# Patient Record
Sex: MALE | Race: WHITE | NOT HISPANIC OR LATINO
[De-identification: names, ages, dates, MRNs, and addresses within clinical notes are randomized per-mention and may not be internally consistent; named-entity substitution may affect disease eponyms.]

---

## 2017-03-10 ENCOUNTER — APPOINTMENT (OUTPATIENT)
Dept: NEUROLOGY | Facility: CLINIC | Age: 82
End: 2017-03-10

## 2017-04-17 ENCOUNTER — LABORATORY RESULT (OUTPATIENT)
Age: 82
End: 2017-04-17

## 2017-04-17 ENCOUNTER — APPOINTMENT (OUTPATIENT)
Dept: INTERNAL MEDICINE | Facility: CLINIC | Age: 82
End: 2017-04-17

## 2017-04-17 VITALS
RESPIRATION RATE: 14 BRPM | HEART RATE: 73 BPM | DIASTOLIC BLOOD PRESSURE: 64 MMHG | BODY MASS INDEX: 22.21 KG/M2 | TEMPERATURE: 98 F | HEIGHT: 72 IN | OXYGEN SATURATION: 93 % | SYSTOLIC BLOOD PRESSURE: 118 MMHG | WEIGHT: 164 LBS

## 2017-04-19 LAB
ALBUMIN SERPL ELPH-MCNC: 3.5 G/DL
ALP BLD-CCNC: 120 U/L
ALT SERPL-CCNC: 31 U/L
ANION GAP SERPL CALC-SCNC: 15 MMOL/L
AST SERPL-CCNC: 54 U/L
BASOPHILS # BLD AUTO: 0.03 K/UL
BASOPHILS NFR BLD AUTO: 0.6 %
BILIRUB SERPL-MCNC: 0.8 MG/DL
BUN SERPL-MCNC: 17 MG/DL
CALCIUM SERPL-MCNC: 9.5 MG/DL
CHLORIDE SERPL-SCNC: 104 MMOL/L
CHOLEST SERPL-MCNC: 200 MG/DL
CHOLEST/HDLC SERPL: 2.3 RATIO
CO2 SERPL-SCNC: 23 MMOL/L
CREAT SERPL-MCNC: 1.05 MG/DL
EOSINOPHIL # BLD AUTO: 0.13 K/UL
EOSINOPHIL NFR BLD AUTO: 2.8 %
FOLATE SERPL-MCNC: >20 NG/ML
GLUCOSE SERPL-MCNC: 99 MG/DL
HCT VFR BLD CALC: 33.6 %
HDLC SERPL-MCNC: 86 MG/DL
HGB BLD-MCNC: 10.8 G/DL
IMM GRANULOCYTES NFR BLD AUTO: 1.7 %
LDLC SERPL CALC-MCNC: 98 MG/DL
LYMPHOCYTES # BLD AUTO: 1.9 K/UL
LYMPHOCYTES NFR BLD AUTO: 40.7 %
MAN DIFF?: NORMAL
MCHC RBC-ENTMCNC: 32.1 GM/DL
MCHC RBC-ENTMCNC: 33.8 PG
MCV RBC AUTO: 105 FL
MONOCYTES # BLD AUTO: 1.01 K/UL
MONOCYTES NFR BLD AUTO: 21.6 %
NEUTROPHILS # BLD AUTO: 1.52 K/UL
NEUTROPHILS NFR BLD AUTO: 32.6 %
PLATELET # BLD AUTO: 81 K/UL
POTASSIUM SERPL-SCNC: 4.5 MMOL/L
PROT SERPL-MCNC: 6.6 G/DL
RBC # BLD: 3.2 M/UL
RBC # FLD: 20.9 %
SODIUM SERPL-SCNC: 142 MMOL/L
TRIGL SERPL-MCNC: 81 MG/DL
TSH SERPL-ACNC: 2.92 UIU/ML
VIT B12 SERPL-MCNC: 1176 PG/ML
WBC # FLD AUTO: 4.67 K/UL

## 2017-06-12 ENCOUNTER — APPOINTMENT (OUTPATIENT)
Dept: INTERNAL MEDICINE | Facility: CLINIC | Age: 82
End: 2017-06-12

## 2017-06-12 VITALS
RESPIRATION RATE: 16 BRPM | HEART RATE: 70 BPM | DIASTOLIC BLOOD PRESSURE: 70 MMHG | SYSTOLIC BLOOD PRESSURE: 105 MMHG | TEMPERATURE: 98.2 F | OXYGEN SATURATION: 94 % | BODY MASS INDEX: 22.79 KG/M2 | WEIGHT: 168 LBS

## 2017-06-13 LAB
APPEARANCE: CLEAR
BACTERIA: NEGATIVE
BILIRUBIN URINE: NEGATIVE
BLOOD URINE: NEGATIVE
COLOR: YELLOW
GLUCOSE QUALITATIVE U: NORMAL MG/DL
HYALINE CASTS: 1 /LPF
KETONES URINE: NEGATIVE
LEUKOCYTE ESTERASE URINE: NEGATIVE
MICROSCOPIC-UA: NORMAL
NITRITE URINE: NEGATIVE
PH URINE: 5
PROTEIN URINE: NEGATIVE MG/DL
RED BLOOD CELLS URINE: 1 /HPF
SPECIFIC GRAVITY URINE: 1.02
SQUAMOUS EPITHELIAL CELLS: 0 /HPF
UROBILINOGEN URINE: NORMAL MG/DL
WHITE BLOOD CELLS URINE: 1 /HPF

## 2017-08-09 ENCOUNTER — APPOINTMENT (OUTPATIENT)
Dept: INTERNAL MEDICINE | Facility: CLINIC | Age: 82
End: 2017-08-09
Payer: MEDICARE

## 2017-08-09 VITALS
BODY MASS INDEX: 22.75 KG/M2 | DIASTOLIC BLOOD PRESSURE: 70 MMHG | SYSTOLIC BLOOD PRESSURE: 122 MMHG | OXYGEN SATURATION: 98 % | TEMPERATURE: 97.4 F | RESPIRATION RATE: 14 BRPM | HEIGHT: 72 IN | HEART RATE: 68 BPM | WEIGHT: 168 LBS

## 2017-08-09 PROCEDURE — 99214 OFFICE O/P EST MOD 30 MIN: CPT

## 2017-08-10 LAB
CHOLEST SERPL-MCNC: 193 MG/DL
CHOLEST/HDLC SERPL: 2.1 RATIO
HDLC SERPL-MCNC: 92 MG/DL
LDLC SERPL CALC-MCNC: 89 MG/DL
TRIGL SERPL-MCNC: 61 MG/DL

## 2017-08-21 ENCOUNTER — APPOINTMENT (OUTPATIENT)
Dept: HEART AND VASCULAR | Facility: CLINIC | Age: 82
End: 2017-08-21
Payer: MEDICARE

## 2017-08-21 VITALS
OXYGEN SATURATION: 97 % | DIASTOLIC BLOOD PRESSURE: 68 MMHG | HEART RATE: 80 BPM | SYSTOLIC BLOOD PRESSURE: 118 MMHG | TEMPERATURE: 97.2 F

## 2017-08-21 PROCEDURE — 99214 OFFICE O/P EST MOD 30 MIN: CPT | Mod: 25

## 2017-08-21 PROCEDURE — 93306 TTE W/DOPPLER COMPLETE: CPT | Mod: XE

## 2017-08-21 PROCEDURE — 93000 ELECTROCARDIOGRAM COMPLETE: CPT

## 2017-09-12 ENCOUNTER — OUTPATIENT (OUTPATIENT)
Dept: OUTPATIENT SERVICES | Facility: HOSPITAL | Age: 82
LOS: 1 days | End: 2017-09-12
Payer: MEDICARE

## 2017-09-12 DIAGNOSIS — Z90.49 ACQUIRED ABSENCE OF OTHER SPECIFIED PARTS OF DIGESTIVE TRACT: Chronic | ICD-10-CM

## 2017-09-12 PROCEDURE — 74177 CT ABD & PELVIS W/CONTRAST: CPT

## 2017-09-12 PROCEDURE — 71260 CT THORAX DX C+: CPT

## 2017-09-12 PROCEDURE — 74177 CT ABD & PELVIS W/CONTRAST: CPT | Mod: 26

## 2017-09-12 PROCEDURE — 71260 CT THORAX DX C+: CPT | Mod: 26

## 2018-01-18 ENCOUNTER — OUTPATIENT (OUTPATIENT)
Dept: OUTPATIENT SERVICES | Facility: HOSPITAL | Age: 83
LOS: 1 days | End: 2018-01-18
Payer: MEDICARE

## 2018-01-18 DIAGNOSIS — Z90.49 ACQUIRED ABSENCE OF OTHER SPECIFIED PARTS OF DIGESTIVE TRACT: Chronic | ICD-10-CM

## 2018-01-18 PROCEDURE — 74177 CT ABD & PELVIS W/CONTRAST: CPT | Mod: 26

## 2018-01-18 PROCEDURE — 71260 CT THORAX DX C+: CPT | Mod: 26

## 2018-01-18 PROCEDURE — 71260 CT THORAX DX C+: CPT

## 2018-01-18 PROCEDURE — 74177 CT ABD & PELVIS W/CONTRAST: CPT

## 2018-01-26 ENCOUNTER — APPOINTMENT (OUTPATIENT)
Dept: INTERNAL MEDICINE | Facility: CLINIC | Age: 83
End: 2018-01-26

## 2018-02-05 ENCOUNTER — TRANSCRIPTION ENCOUNTER (OUTPATIENT)
Age: 83
End: 2018-02-05

## 2018-04-17 ENCOUNTER — APPOINTMENT (OUTPATIENT)
Dept: OTOLARYNGOLOGY | Facility: CLINIC | Age: 83
End: 2018-04-17

## 2018-04-25 ENCOUNTER — APPOINTMENT (OUTPATIENT)
Dept: OTOLARYNGOLOGY | Facility: CLINIC | Age: 83
End: 2018-04-25
Payer: MEDICARE

## 2018-04-25 VITALS
DIASTOLIC BLOOD PRESSURE: 56 MMHG | HEART RATE: 66 BPM | OXYGEN SATURATION: 99 % | RESPIRATION RATE: 15 BRPM | SYSTOLIC BLOOD PRESSURE: 117 MMHG | TEMPERATURE: 97.8 F

## 2018-04-25 DIAGNOSIS — R42 DIZZINESS AND GIDDINESS: ICD-10-CM

## 2018-04-25 PROCEDURE — 92537 CALORIC VSTBLR TEST W/REC: CPT

## 2018-04-25 PROCEDURE — 92540 BASIC VESTIBULAR EVALUATION: CPT

## 2018-04-25 PROCEDURE — 99204 OFFICE O/P NEW MOD 45 MIN: CPT

## 2018-04-26 PROBLEM — R42 DIZZINESS: Status: ACTIVE | Noted: 2018-04-26

## 2018-11-12 ENCOUNTER — APPOINTMENT (OUTPATIENT)
Dept: HEART AND VASCULAR | Facility: CLINIC | Age: 83
End: 2018-11-12

## 2018-11-12 ENCOUNTER — APPOINTMENT (OUTPATIENT)
Dept: INTERNAL MEDICINE | Facility: CLINIC | Age: 83
End: 2018-11-12
Payer: MEDICARE

## 2018-11-12 VITALS
RESPIRATION RATE: 14 BRPM | DIASTOLIC BLOOD PRESSURE: 60 MMHG | SYSTOLIC BLOOD PRESSURE: 120 MMHG | OXYGEN SATURATION: 97 % | TEMPERATURE: 97.4 F | WEIGHT: 183 LBS | HEART RATE: 79 BPM | BODY MASS INDEX: 24.82 KG/M2

## 2018-11-12 DIAGNOSIS — G47.62 SLEEP RELATED LEG CRAMPS: ICD-10-CM

## 2018-11-12 PROCEDURE — 99214 OFFICE O/P EST MOD 30 MIN: CPT

## 2018-11-12 NOTE — HISTORY OF PRESENT ILLNESS
[FreeTextEntry1] : hand pain [de-identified] : Hand pain - twisting it last month  - not getting better\par \par Leg cramping at night.\par  - tyring to drink more water. \par taking mag and potassium supplements -  \par treatment is to walk around.  \par \par With eating - os=ccasional a small amount comes up.  \par \par

## 2018-11-12 NOTE — PHYSICAL EXAM
[No Acute Distress] : no acute distress [Well Nourished] : well nourished [Well Developed] : well developed [Normal Oropharynx] : the oropharynx was normal [Soft] : abdomen soft [Non Tender] : non-tender [No Joint Swelling] : no joint swelling [de-identified] : point td over left thenar process

## 2018-11-12 NOTE — PLAN
[FreeTextEntry1] : \par \par Reflux - trial of prilosec - if no reema improvemetn the GI eval - Dr Bowden\par \par Cramping- no easy solution -stretching reviewed\par \par Ortho hand referral\par

## 2019-01-25 ENCOUNTER — APPOINTMENT (OUTPATIENT)
Dept: INTERNAL MEDICINE | Facility: CLINIC | Age: 84
End: 2019-01-25
Payer: MEDICARE

## 2019-01-25 VITALS
BODY MASS INDEX: 24.92 KG/M2 | SYSTOLIC BLOOD PRESSURE: 120 MMHG | HEART RATE: 75 BPM | HEIGHT: 72 IN | TEMPERATURE: 97.6 F | OXYGEN SATURATION: 98 % | RESPIRATION RATE: 14 BRPM | DIASTOLIC BLOOD PRESSURE: 58 MMHG | WEIGHT: 184 LBS

## 2019-01-25 PROCEDURE — 36415 COLL VENOUS BLD VENIPUNCTURE: CPT

## 2019-01-25 PROCEDURE — 93000 ELECTROCARDIOGRAM COMPLETE: CPT

## 2019-01-25 PROCEDURE — 99397 PER PM REEVAL EST PAT 65+ YR: CPT

## 2019-01-27 LAB
ALBUMIN SERPL ELPH-MCNC: 4.3 G/DL
ALP BLD-CCNC: 65 U/L
ALT SERPL-CCNC: 15 U/L
ANION GAP SERPL CALC-SCNC: 12 MMOL/L
APPEARANCE: CLEAR
AST SERPL-CCNC: 23 U/L
BASOPHILS # BLD AUTO: 0.09 K/UL
BASOPHILS NFR BLD AUTO: 1.2 %
BILIRUB SERPL-MCNC: 0.2 MG/DL
BILIRUBIN URINE: NEGATIVE
BLOOD URINE: NEGATIVE
BUN SERPL-MCNC: 29 MG/DL
CALCIUM SERPL-MCNC: 9.1 MG/DL
CHLORIDE SERPL-SCNC: 106 MMOL/L
CHOLEST SERPL-MCNC: 172 MG/DL
CHOLEST/HDLC SERPL: 2.5 RATIO
CO2 SERPL-SCNC: 24 MMOL/L
COLOR: YELLOW
CREAT SERPL-MCNC: 1.37 MG/DL
EOSINOPHIL # BLD AUTO: 0.33 K/UL
EOSINOPHIL NFR BLD AUTO: 4.5 %
GLUCOSE QUALITATIVE U: NEGATIVE MG/DL
GLUCOSE SERPL-MCNC: 78 MG/DL
HCT VFR BLD CALC: 38.1 %
HDLC SERPL-MCNC: 69 MG/DL
HGB BLD-MCNC: 12.7 G/DL
IMM GRANULOCYTES NFR BLD AUTO: 0.4 %
KETONES URINE: NEGATIVE
LDLC SERPL CALC-MCNC: 89 MG/DL
LEUKOCYTE ESTERASE URINE: ABNORMAL
LYMPHOCYTES # BLD AUTO: 2.11 K/UL
LYMPHOCYTES NFR BLD AUTO: 28.8 %
MAN DIFF?: NORMAL
MCHC RBC-ENTMCNC: 31.3 PG
MCHC RBC-ENTMCNC: 33.3 GM/DL
MCV RBC AUTO: 93.8 FL
MONOCYTES # BLD AUTO: 0.81 K/UL
MONOCYTES NFR BLD AUTO: 11.1 %
NEUTROPHILS # BLD AUTO: 3.95 K/UL
NEUTROPHILS NFR BLD AUTO: 54 %
NITRITE URINE: NEGATIVE
PH URINE: 5
PLATELET # BLD AUTO: 279 K/UL
POTASSIUM SERPL-SCNC: 5 MMOL/L
PROT SERPL-MCNC: 7.3 G/DL
PROTEIN URINE: NEGATIVE MG/DL
RBC # BLD: 4.06 M/UL
RBC # FLD: 14.9 %
SODIUM SERPL-SCNC: 142 MMOL/L
SPECIFIC GRAVITY URINE: 1.02
TRIGL SERPL-MCNC: 72 MG/DL
TSH SERPL-ACNC: 3.95 UIU/ML
UROBILINOGEN URINE: NEGATIVE MG/DL
VIT B12 SERPL-MCNC: 912 PG/ML
WBC # FLD AUTO: 7.32 K/UL

## 2019-01-27 NOTE — HISTORY OF PRESENT ILLNESS
[FreeTextEntry1] : wellness and fatigue [de-identified] : wakes up with leg cramps\par  has intemrittent sleeping- gets up to work\par  not exercising.\par gets tired with 2 flights of stairs.\par also with itching right eye last 3 days no vision changes

## 2019-01-27 NOTE — PLAN
[FreeTextEntry1] : Wellness complete\par labs today\par  encourage more physical activity- stairs daily\par continue current medication regimen\par

## 2019-01-27 NOTE — PHYSICAL EXAM
[No Acute Distress] : no acute distress [Well Nourished] : well nourished [Well Developed] : well developed [Well-Appearing] : well-appearing [Normal Sclera/Conjunctiva] : normal sclera/conjunctiva [PERRL] : pupils equal round and reactive to light [EOMI] : extraocular movements intact [Normal Outer Ear/Nose] : the outer ears and nose were normal in appearance [Normal Oropharynx] : the oropharynx was normal [No JVD] : no jugular venous distention [Supple] : supple [No Lymphadenopathy] : no lymphadenopathy [Thyroid Normal, No Nodules] : the thyroid was normal and there were no nodules present [No Respiratory Distress] : no respiratory distress  [Clear to Auscultation] : lungs were clear to auscultation bilaterally [No Accessory Muscle Use] : no accessory muscle use [Normal Rate] : normal rate  [Regular Rhythm] : with a regular rhythm [Normal S1, S2] : normal S1 and S2 [No Murmur] : no murmur heard [Soft] : abdomen soft [Non Tender] : non-tender [Non-distended] : non-distended [No Masses] : no abdominal mass palpated [No HSM] : no HSM [Normal Bowel Sounds] : normal bowel sounds [Normal Posterior Cervical Nodes] : no posterior cervical lymphadenopathy [Normal Anterior Cervical Nodes] : no anterior cervical lymphadenopathy [No CVA Tenderness] : no CVA  tenderness [No Spinal Tenderness] : no spinal tenderness [No Joint Swelling] : no joint swelling [Grossly Normal Strength/Tone] : grossly normal strength/tone [No Rash] : no rash [Normal Gait] : normal gait [Coordination Grossly Intact] : coordination grossly intact [No Focal Deficits] : no focal deficits [Deep Tendon Reflexes (DTR)] : deep tendon reflexes were 2+ and symmetric [Normal Affect] : the affect was normal [Normal Insight/Judgement] : insight and judgment were intact [de-identified] : trace edema

## 2019-01-29 LAB
HGB A MFR BLD: 97.5 %
HGB A2 MFR BLD: 2.5 %
HGB FRACT BLD-IMP: NORMAL

## 2019-03-04 ENCOUNTER — TRANSCRIPTION ENCOUNTER (OUTPATIENT)
Age: 84
End: 2019-03-04

## 2019-04-03 ENCOUNTER — RESULT REVIEW (OUTPATIENT)
Age: 84
End: 2019-04-03

## 2019-04-03 ENCOUNTER — MESSAGE (OUTPATIENT)
Age: 84
End: 2019-04-03

## 2019-04-03 ENCOUNTER — INPATIENT (INPATIENT)
Facility: HOSPITAL | Age: 84
LOS: 1 days | Discharge: HOME | DRG: 871 | End: 2019-04-05
Payer: MEDICARE

## 2019-04-03 VITALS
OXYGEN SATURATION: 97 % | RESPIRATION RATE: 18 BRPM | DIASTOLIC BLOOD PRESSURE: 72 MMHG | SYSTOLIC BLOOD PRESSURE: 123 MMHG | HEART RATE: 89 BPM | TEMPERATURE: 102 F

## 2019-04-03 DIAGNOSIS — R32 UNSPECIFIED URINARY INCONTINENCE: ICD-10-CM

## 2019-04-03 DIAGNOSIS — G92 TOXIC ENCEPHALOPATHY: ICD-10-CM

## 2019-04-03 DIAGNOSIS — N40.0 BENIGN PROSTATIC HYPERPLASIA WITHOUT LOWER URINARY TRACT SYMPTOMS: ICD-10-CM

## 2019-04-03 DIAGNOSIS — D64.9 ANEMIA, UNSPECIFIED: ICD-10-CM

## 2019-04-03 DIAGNOSIS — R50.9 FEVER, UNSPECIFIED: ICD-10-CM

## 2019-04-03 DIAGNOSIS — A41.9 SEPSIS, UNSPECIFIED ORGANISM: ICD-10-CM

## 2019-04-03 DIAGNOSIS — C18.9 MALIGNANT NEOPLASM OF COLON, UNSPECIFIED: ICD-10-CM

## 2019-04-03 DIAGNOSIS — Z90.49 ACQUIRED ABSENCE OF OTHER SPECIFIED PARTS OF DIGESTIVE TRACT: Chronic | ICD-10-CM

## 2019-04-03 DIAGNOSIS — Z91.89 OTHER SPECIFIED PERSONAL RISK FACTORS, NOT ELSEWHERE CLASSIFIED: ICD-10-CM

## 2019-04-03 DIAGNOSIS — Z29.9 ENCOUNTER FOR PROPHYLACTIC MEASURES, UNSPECIFIED: ICD-10-CM

## 2019-04-03 DIAGNOSIS — R63.8 OTHER SYMPTOMS AND SIGNS CONCERNING FOOD AND FLUID INTAKE: ICD-10-CM

## 2019-04-03 LAB
ALBUMIN SERPL ELPH-MCNC: 3.7 G/DL — SIGNIFICANT CHANGE UP (ref 3.3–5)
ALP SERPL-CCNC: 60 U/L — SIGNIFICANT CHANGE UP (ref 40–120)
ALT FLD-CCNC: 14 U/L — SIGNIFICANT CHANGE UP (ref 10–45)
ANION GAP SERPL CALC-SCNC: 10 MMOL/L — SIGNIFICANT CHANGE UP (ref 5–17)
APPEARANCE UR: CLEAR — SIGNIFICANT CHANGE UP
APPEARANCE UR: CLEAR — SIGNIFICANT CHANGE UP
APTT BLD: 30.7 SEC — SIGNIFICANT CHANGE UP (ref 27.5–36.3)
AST SERPL-CCNC: 20 U/L — SIGNIFICANT CHANGE UP (ref 10–40)
BACTERIA # UR AUTO: PRESENT /HPF
BASOPHILS # BLD AUTO: 0.07 K/UL — SIGNIFICANT CHANGE UP (ref 0–0.2)
BASOPHILS NFR BLD AUTO: 0.6 % — SIGNIFICANT CHANGE UP (ref 0–2)
BILIRUB SERPL-MCNC: 0.5 MG/DL — SIGNIFICANT CHANGE UP (ref 0.2–1.2)
BILIRUB UR-MCNC: NEGATIVE — SIGNIFICANT CHANGE UP
BILIRUB UR-MCNC: NEGATIVE — SIGNIFICANT CHANGE UP
BUN SERPL-MCNC: 17 MG/DL — SIGNIFICANT CHANGE UP (ref 7–23)
CALCIUM SERPL-MCNC: 9.2 MG/DL — SIGNIFICANT CHANGE UP (ref 8.4–10.5)
CHLORIDE SERPL-SCNC: 102 MMOL/L — SIGNIFICANT CHANGE UP (ref 96–108)
CO2 SERPL-SCNC: 25 MMOL/L — SIGNIFICANT CHANGE UP (ref 22–31)
COLOR SPEC: YELLOW — SIGNIFICANT CHANGE UP
COLOR SPEC: YELLOW — SIGNIFICANT CHANGE UP
CREAT SERPL-MCNC: 1.27 MG/DL — SIGNIFICANT CHANGE UP (ref 0.5–1.3)
DIFF PNL FLD: ABNORMAL
DIFF PNL FLD: ABNORMAL
EOSINOPHIL # BLD AUTO: 0.04 K/UL — SIGNIFICANT CHANGE UP (ref 0–0.5)
EOSINOPHIL NFR BLD AUTO: 0.3 % — SIGNIFICANT CHANGE UP (ref 0–6)
EPI CELLS # UR: SIGNIFICANT CHANGE UP /HPF (ref 0–5)
FLU A RESULT: SIGNIFICANT CHANGE UP
FLU A RESULT: SIGNIFICANT CHANGE UP
FLUAV AG NPH QL: SIGNIFICANT CHANGE UP
FLUBV AG NPH QL: SIGNIFICANT CHANGE UP
GLUCOSE SERPL-MCNC: 112 MG/DL — HIGH (ref 70–99)
GLUCOSE UR QL: NEGATIVE — SIGNIFICANT CHANGE UP
GLUCOSE UR QL: NEGATIVE — SIGNIFICANT CHANGE UP
HCT VFR BLD CALC: 38.2 % — LOW (ref 39–50)
HGB BLD-MCNC: 12.6 G/DL — LOW (ref 13–17)
IMM GRANULOCYTES NFR BLD AUTO: 0.3 % — SIGNIFICANT CHANGE UP (ref 0–1.5)
INR BLD: 1.21 — HIGH (ref 0.88–1.16)
KETONES UR-MCNC: ABNORMAL MG/DL
KETONES UR-MCNC: NEGATIVE — SIGNIFICANT CHANGE UP
LACTATE SERPL-SCNC: 1.5 MMOL/L — SIGNIFICANT CHANGE UP (ref 0.5–2)
LEUKOCYTE ESTERASE UR-ACNC: NEGATIVE — SIGNIFICANT CHANGE UP
LEUKOCYTE ESTERASE UR-ACNC: NEGATIVE — SIGNIFICANT CHANGE UP
LYMPHOCYTES # BLD AUTO: 0.8 K/UL — LOW (ref 1–3.3)
LYMPHOCYTES # BLD AUTO: 6.7 % — LOW (ref 13–44)
MCHC RBC-ENTMCNC: 31.4 PG — SIGNIFICANT CHANGE UP (ref 27–34)
MCHC RBC-ENTMCNC: 33 GM/DL — SIGNIFICANT CHANGE UP (ref 32–36)
MCV RBC AUTO: 95.3 FL — SIGNIFICANT CHANGE UP (ref 80–100)
MONOCYTES # BLD AUTO: 1.14 K/UL — HIGH (ref 0–0.9)
MONOCYTES NFR BLD AUTO: 9.5 % — SIGNIFICANT CHANGE UP (ref 2–14)
NEUTROPHILS # BLD AUTO: 9.94 K/UL — HIGH (ref 1.8–7.4)
NEUTROPHILS NFR BLD AUTO: 82.6 % — HIGH (ref 43–77)
NITRITE UR-MCNC: NEGATIVE — SIGNIFICANT CHANGE UP
NITRITE UR-MCNC: NEGATIVE — SIGNIFICANT CHANGE UP
NRBC # BLD: 0 /100 WBCS — SIGNIFICANT CHANGE UP (ref 0–0)
PH UR: 6 — SIGNIFICANT CHANGE UP (ref 5–8)
PH UR: 6 — SIGNIFICANT CHANGE UP (ref 5–8)
PLATELET # BLD AUTO: 227 K/UL — SIGNIFICANT CHANGE UP (ref 150–400)
POTASSIUM SERPL-MCNC: 4.8 MMOL/L — SIGNIFICANT CHANGE UP (ref 3.5–5.3)
POTASSIUM SERPL-SCNC: 4.8 MMOL/L — SIGNIFICANT CHANGE UP (ref 3.5–5.3)
PROT SERPL-MCNC: 7.2 G/DL — SIGNIFICANT CHANGE UP (ref 6–8.3)
PROT UR-MCNC: NEGATIVE MG/DL — SIGNIFICANT CHANGE UP
PROT UR-MCNC: NEGATIVE MG/DL — SIGNIFICANT CHANGE UP
PROTHROM AB SERPL-ACNC: 13.7 SEC — HIGH (ref 10–12.9)
RBC # BLD: 4.01 M/UL — LOW (ref 4.2–5.8)
RBC # FLD: 13.9 % — SIGNIFICANT CHANGE UP (ref 10.3–14.5)
RBC CASTS # UR COMP ASSIST: < 5 /HPF — SIGNIFICANT CHANGE UP
RSV RESULT: SIGNIFICANT CHANGE UP
RSV RNA RESP QL NAA+PROBE: SIGNIFICANT CHANGE UP
SODIUM SERPL-SCNC: 137 MMOL/L — SIGNIFICANT CHANGE UP (ref 135–145)
SP GR SPEC: 1.02 — SIGNIFICANT CHANGE UP (ref 1–1.03)
SP GR SPEC: <=1.005 — SIGNIFICANT CHANGE UP (ref 1–1.03)
UROBILINOGEN FLD QL: 0.2 E.U./DL — SIGNIFICANT CHANGE UP
UROBILINOGEN FLD QL: 0.2 E.U./DL — SIGNIFICANT CHANGE UP
WBC # BLD: 12.03 K/UL — HIGH (ref 3.8–10.5)
WBC # FLD AUTO: 12.03 K/UL — HIGH (ref 3.8–10.5)
WBC UR QL: ABNORMAL /HPF

## 2019-04-03 PROCEDURE — 99223 1ST HOSP IP/OBS HIGH 75: CPT | Mod: GC

## 2019-04-03 PROCEDURE — 99285 EMERGENCY DEPT VISIT HI MDM: CPT

## 2019-04-03 PROCEDURE — 71045 X-RAY EXAM CHEST 1 VIEW: CPT | Mod: 26

## 2019-04-03 RX ORDER — HEPARIN SODIUM 5000 [USP'U]/ML
5000 INJECTION INTRAVENOUS; SUBCUTANEOUS EVERY 8 HOURS
Qty: 0 | Refills: 0 | Status: DISCONTINUED | OUTPATIENT
Start: 2019-04-03 | End: 2019-04-05

## 2019-04-03 RX ORDER — ACETAMINOPHEN 500 MG
650 TABLET ORAL EVERY 6 HOURS
Qty: 0 | Refills: 0 | Status: DISCONTINUED | OUTPATIENT
Start: 2019-04-03 | End: 2019-04-05

## 2019-04-03 RX ORDER — TAMSULOSIN HYDROCHLORIDE 0.4 MG/1
0.4 CAPSULE ORAL AT BEDTIME
Qty: 0 | Refills: 0 | Status: DISCONTINUED | OUTPATIENT
Start: 2019-04-03 | End: 2019-04-05

## 2019-04-03 RX ORDER — PIPERACILLIN AND TAZOBACTAM 4; .5 G/20ML; G/20ML
3.38 INJECTION, POWDER, LYOPHILIZED, FOR SOLUTION INTRAVENOUS ONCE
Qty: 0 | Refills: 0 | Status: COMPLETED | OUTPATIENT
Start: 2019-04-03 | End: 2019-04-03

## 2019-04-03 RX ORDER — VANCOMYCIN HCL 1 G
1000 VIAL (EA) INTRAVENOUS ONCE
Qty: 0 | Refills: 0 | Status: COMPLETED | OUTPATIENT
Start: 2019-04-03 | End: 2019-04-03

## 2019-04-03 RX ORDER — SODIUM CHLORIDE 9 MG/ML
2000 INJECTION INTRAMUSCULAR; INTRAVENOUS; SUBCUTANEOUS ONCE
Qty: 0 | Refills: 0 | Status: COMPLETED | OUTPATIENT
Start: 2019-04-03 | End: 2019-04-03

## 2019-04-03 RX ORDER — ACETAMINOPHEN 500 MG
1000 TABLET ORAL ONCE
Qty: 0 | Refills: 0 | Status: COMPLETED | OUTPATIENT
Start: 2019-04-03 | End: 2019-04-03

## 2019-04-03 RX ADMIN — PIPERACILLIN AND TAZOBACTAM 200 GRAM(S): 4; .5 INJECTION, POWDER, LYOPHILIZED, FOR SOLUTION INTRAVENOUS at 14:35

## 2019-04-03 RX ADMIN — PIPERACILLIN AND TAZOBACTAM 3.38 GRAM(S): 4; .5 INJECTION, POWDER, LYOPHILIZED, FOR SOLUTION INTRAVENOUS at 15:09

## 2019-04-03 RX ADMIN — Medication 1000 MILLIGRAM(S): at 15:14

## 2019-04-03 RX ADMIN — Medication 1000 MILLIGRAM(S): at 15:46

## 2019-04-03 RX ADMIN — Medication 1000 MILLIGRAM(S): at 14:36

## 2019-04-03 RX ADMIN — Medication 250 MILLIGRAM(S): at 15:23

## 2019-04-03 RX ADMIN — SODIUM CHLORIDE 666.67 MILLILITER(S): 9 INJECTION INTRAMUSCULAR; INTRAVENOUS; SUBCUTANEOUS at 14:30

## 2019-04-03 RX ADMIN — HEPARIN SODIUM 5000 UNIT(S): 5000 INJECTION INTRAVENOUS; SUBCUTANEOUS at 22:21

## 2019-04-03 RX ADMIN — TAMSULOSIN HYDROCHLORIDE 0.4 MILLIGRAM(S): 0.4 CAPSULE ORAL at 22:21

## 2019-04-03 NOTE — ED PROVIDER NOTE - NEURO NEGATIVE STATEMENT, MLM
no loss of consciousness, no gait abnormality, no headache, no sensory deficits, + generalized weakness.

## 2019-04-03 NOTE — H&P ADULT - NSHPPHYSICALEXAM_GEN_ALL_CORE
.  VITAL SIGNS:  T(C): 37.4 (04-03-19 @ 17:15), Max: 38.8 (04-03-19 @ 13:40)  T(F): 99.3 (04-03-19 @ 17:15), Max: 101.8 (04-03-19 @ 13:40)  HR: 74 (04-03-19 @ 17:15) (74 - 89)  BP: 113/58 (04-03-19 @ 17:15) (113/58 - 123/72)  BP(mean): --  RR: 18 (04-03-19 @ 17:15) (18 - 18)  SpO2: 96% (04-03-19 @ 17:15) (96% - 97%)  Wt(kg): --    PHYSICAL EXAM:    Constitutional: WDWN resting comfortably in bed; NAD  Head: NC/AT  Eyes: PERRL, EOMI, anicteric sclera  ENT: no nasal discharge - pale nasal mucosa; uvula midline, no pharyngeal erythema or exudates; tympanic membranes clear without air-fluid level or erythema; MMM  Neck: supple; no JVD or thyromegaly  Respiratory: CTA B/L; no W/R/R, no retractions  Cardiac: +S1/S2; RRR; no M/R/G; PMI non-displaced  Chest: Chemoport in place, no erythema or tenderness around site  Gastrointestinal: soft, mild tenderness to palpation in the suprapubic region; +BSx4  Back: spine midline, no bony tenderness or step-offs; no CVAT B/L  Extremities: WWP, no clubbing or cyanosis; mild non-pitting edema of the b/l LE  Musculoskeletal: NROM x4; no joint swelling, tenderness or erythema  Vascular: 2+ radial, femoral, DP/PT pulses B/L  Dermatologic: skin warm, dry and intact; no rashes, wounds, or scars  Lymphatic: no submandibular or cervical LAD  Neurologic: AAOx3; CNII-XII grossly intact; no focal deficits  Psychiatric: affect and characteristics of appearance, verbalizations, behaviors are appropriate

## 2019-04-03 NOTE — ED ADULT TRIAGE NOTE - CHIEF COMPLAINT QUOTE
As per wife "he started coming down with a cold yesterday and then at night he started urinating on himself."

## 2019-04-03 NOTE — H&P ADULT - PROBLEM SELECTOR PLAN 6
Patient with remote history of colon cancer, diagnosed in 1996 with short course of chemotherapy via chemoport and laparoscopic resection. No further treatment has been performed.   - Chemoport without erythema or tenderness  - f/u blood cultures, if bacteremic would consider as source  - Per patient, no plan for further chemotherapy, if bacteremic would consider removal admitted in 2016 for TIA, per ashley notes, pt deferred statin at the time, pt appears to not be on statin currently, obtain collateral from PCP re: statin use; c/w ASA

## 2019-04-03 NOTE — H&P ADULT - PROBLEM SELECTOR PLAN 5
Patient with history of BPH, does not report taking any medications. Some abdominal tenderness on examination, pending bladder scan.  - Flomax 0.4mg QHS  - Given hematuria on UA, obtain urine cytology  - Will refer to Urology for follow-up as has been lost to follow-up from prior Urologist Patient with remote history of colon cancer, diagnosed in 1996 with short course of chemotherapy via chemoport and laparoscopic resection. No further treatment has been performed.   - Chemoport without erythema or tenderness  - f/u blood cultures, if bacteremic would consider as source  - Per patient, no plan for further chemotherapy, if bacteremic would consider removal

## 2019-04-03 NOTE — H&P ADULT - PROBLEM SELECTOR PLAN 10
1) PCP Contacted on Admission: (Y/N) -->Y  Name & Phone #: Hair Hull [phone]  2) Date of Contact with PCP: 4/3/19 at 19:05  3) PCP Contacted at Discharge: (Y/N, N/A)  4) Summary of Handoff Given to PCP:   5) Post-Discharge Appointment Date and Location: 1) PCP Contacted on Admission: (Y/N) -->Y  Name & Phone #: Hair Hull [phone]  2) Date of Contact with PCP: 4/3/19 at 19:05  3) PCP Contacted at Discharge: (Y/N, N/A)  4) Summary of Handoff Given to PCP:   5) Post-Discharge Appointment Date and Location:

## 2019-04-03 NOTE — H&P ADULT - PROBLEM SELECTOR PLAN 7
Regular diet  No further IVF at this time  Replete electrolytes K>4; Mg>2 Patient with normocytic anemia of 12.6, appears chronic and without change since prior studies dating back to 2016. Possible BHAVYA vs ACD.  - Iron studies in AM  - Maintain active T&S, maintain IV access admitted in 2016 for TIA, per ashley notes, pt deferred statin at the time, pt appears to not be on statin currently, obtain collateral from PCP re: statin use; c/w ASA

## 2019-04-03 NOTE — H&P ADULT - NSICDXFAMILYHX_GEN_ALL_CORE_FT
FAMILY HISTORY:  No family history of cardiovascular disease FAMILY HISTORY:  FH: cancer, mother  FH: myocardial infarction, father

## 2019-04-03 NOTE — ED PROVIDER NOTE - CONSTITUTIONAL, MLM
normal... Non toxic appearing, well nourished, awake, alert, oriented to person, place, time/situation and in no apparent distress.

## 2019-04-03 NOTE — ED PROVIDER NOTE - RESPIRATORY NEGATIVE STATEMENT, MLM
Discussion/Summary    please note diabetes   a1c is getting better  but still not optima;      follwo up to review and adjust  mediactions     kisney and  liver functions good     prostate  numbers  good         Verified Results  COMP METABOLIC PANEL WITH CBCA, LIPID PANEL AND TSH (CMP,CBCA,LIPFA,TSH) [id number] 01:30PM ANTO[facility] FOOTE     Test Name Result Flag Reference   WHITE BLOOD COUNT 6.3 K/mcL  4.2-11.0   RED CELL COUNT 3.98 mil/mcL L 4.50-5.90   HEMOGLOBIN 12.4 g/dl L 13.0-17.0   HEMATOCRIT 37.6 % L 39.0-51.0   MEAN CORPUSCULAR VOLUME 94.5 fL  78.0-100.0   MEAN CORPUSCULAR HEMOGLOBIN 31.2 pg  26.0-34.0   MEAN CORPUSCULAR HGB CONC 33.0 g/dl  32.0-36.5   RDW-CV 13.2 %  11.0-15.0   PLATELET COUNT 226 K/mcL  140-450   DIFF TYPE      AUTOMATED DIFFERENTIAL   FARZANA% 60 %     LYM% 33 %     MON% 5 %     EOS% 2 %     BASO% 0 %     FARZANA ABS 3.8 K/mcL  1.8-7.7   LYM ABS 2.0 K/mcL  1.0-4.0   MON ABS 0.3 K/mcL  0.3-0.9   EOS ABS 0.1 K/mcL  0.1-0.5   BASO ABS 0.0 K/mcL  0.0-0.3   FASTING STATUS UNKNOWN hrs     SODIUM 143 mmol/L  135-145   POTASSIUM 3.8 mmol/L  3.4-5.1   CHLORIDE 110 mmol/L H    CARBON DIOXIDE 25 mmol/L  21-32   ANION GAP 12 mmol/L  10-20   GLUCOSE 168 mg/dl H 65-99   BUN 14 mg/dl  6-20   CREATININE 1.03 mg/dl  0.67-1.17   GFR EST.AFRICAN AMER >90     eGFR results = or >90 mL/min/1.73m2 = Normal kidney function.   GFR EST.NONAFRI AMER 79     eGFR 60 - 89 mL/min/1.73m2 = Mild decrease in kidney function.   BUN/CREATININE RATIO 14  7-25   CALCIUM 8.6 mg/dl  8.4-10.2   BILIRUBIN TOTAL 0.5 mg/dl  0.2-1.0   GOT/AST 21 Units/L  <38   GPT/ALT 34 Units/L  <79   ALKALINE PHOSPHATASE 90 Units/L     TOTAL PROTEIN 6.3 g/dl L 6.4-8.2   ALBUMIN 3.5 g/dl L 3.6-5.1   GLOBULIN (CALCULATED) 2.8 g/dl  2.0-4.0   A/G RATIO 1.2  1.0-2.4   FASTING STATUS UNKNOWN hrs     CHOLESTEROL 110 mg/dl  <200   Desirable            <200  Borderline High      200 to 239  High                 >=240   LDL CHOLESTEROL (CALCULATED) 53  mg/dl  <130   OPTIMAL               <100  NEAR OPTIMAL          100-129  BORDERLINE HIGH       130-159  HIGH                  160-189  VERY HIGH             >=190   HDL CHOLESTEROL 40 mg/dl L >59   Low            <40  Borderline Low 40 to 49  Near Optimal   50 to 59  Optimal        >=60   TRIGLYCERIDES 83 mg/dl  <150   Normal                   <150  Borderline High          150 to 199  High                     200 to 499  Very High                >=500   NON-HDL CHOLESTEROL 70 mg/dl     Therapeutic Target:  CHD and risk equivalents <130  Multiple risk factors    <160  0 to 1 risk factors      <190   CHOLESTEROL/HDL RATIO 2.8  <4.5   TSH 0.588 mcUnits/mL  0.350-5.000     HEMOGLOBIN A1C GLYCOSYLATED [id number] 01:30PM KISHORESCOTT LUZRHIANNON     Test Name Result Flag Reference   HEMOGLOBIN A1C GLYH 8.1 % H 4.5-5.6   ----DIABETIC SCREENING---  NON DIABETIC                 <5.7%  INCREASED RISK                5.7-6.4%  DIAGNOSTIC FOR DIABETES      >6.4%     ----DIABETIC CONTROL---     A1C%           eAG mg/dL  6.0            126  6.5            140  7.0            154  7.5            169  8.0            183  8.5            197  9.0            212  9.5            226  10.0           240     MICROALBUMIN, URINE [id number] 01:30PM KISHOREANTOINE CHAVEZ     Test Name Result Flag Reference   MICROALBUMIN 2.55 mg/dl     CREATININE RANDOM URINE 233.00 mg/dl     MICROALB/CREAT RATIO 10.9 mcg/mg  <30     PSA - PROSTATE SPECIFIC AG [id number] 01:30PM KISHORESCOTT LUZRHIANNON     Test Name Result Flag Reference   PROSTATE SPECIFIC AG 1.28 ng/ml  <4.01   SUGGESTED AGE SPECIFIC REFERENCE RANGES     AGE                NG/ML  40-49              0.01-2.50  50-59              0.01-3.50  60-69              0.01-4.50  70-79              0.01-6.50     REFERENCE: JOURNAL OF UROLOGY 155, [phone]     [employer] Chemiluminescence        no chest pain, no cough, and no shortness of breath.

## 2019-04-03 NOTE — H&P ADULT - PROBLEM SELECTOR PLAN 8
DELILAH WHITET Regular diet  No further IVF at this time  Replete electrolytes K>4; Mg>2 Regular diet  No further IVF at this time  Replete electrolytes K>4; Mg>2    #PPX: HSQ  MVT Patient with normocytic anemia of 12.6, appears chronic and without change since prior studies dating back to 2016. Possible BHAVYA vs ACD.  - Iron studies in AM  - Maintain active T&S, maintain IV access

## 2019-04-03 NOTE — H&P ADULT - NSHPLABSRESULTS_GEN_ALL_CORE
.  LABS:                         12.6    )-----------( 227      ( 2019 14:26 )             38.2         137  |  102  |  17  ----------------------------<  112<H>  4.8   |  25  |  1.27    Ca    9.2      2019 14:26    TPro  7.2  /  Alb  3.7  /  TBili  0.5  /  DBili  x   /  AST  20  /  ALT  14  /  AlkPhos  60      PT/INR - ( 2019 14:26 )   PT: 13.7 sec;   INR: 1.21          PTT - ( 2019 14:26 )  PTT:30.7 sec  Urinalysis Basic - ( 2019 16:15 )    Color: Yellow / Appearance: Clear / S.020 / pH: x  Gluc: x / Ketone: Trace mg/dL  / Bili: Negative / Urobili: 0.2 E.U./dL   Blood: x / Protein: NEGATIVE mg/dL / Nitrite: NEGATIVE   Leuk Esterase: NEGATIVE / RBC: > 10 /HPF / WBC < 5 /HPF   Sq Epi: x / Non Sq Epi: 0-5 /HPF / Bacteria: Present /HPF            Lactate, Blood: 1.5 mmoL/L ( @ 14:23)      RADIOLOGY, EKG & ADDITIONAL TESTS: Reviewed.

## 2019-04-03 NOTE — ED PROVIDER NOTE - OBJECTIVE STATEMENT
85 year old male on no medications presents to ED with concern for fever and not feeling himself since yesterday afternoon.  Wife at bedside notes he began with an elevated temperature yesterday of 98 - noting he typically runs lower.  On ED arrival today he is febrile.  He notes associated nasal congestion and feeling generally weak.  He denies associated headache, chest pain, shortness of breath, cough, body aches, abdominal pain, changes to bowel movements, nausea, emesis, peripheral edema or any additional acute complaints or concerns at this time.  Wife at bedside does note he had a UTI several years ago and had a similar presentation at that time.

## 2019-04-03 NOTE — H&P ADULT - PROBLEM SELECTOR PLAN 2
Patient with some fluctuation of mental status per wife, now back at baseline. Has happened in the past when patient had a UTI, likely TME 2/2 sepsis.  - As above Patient with some fluctuation of mental status per wife, now back at baseline. Has happened in the past when patient had a UTI, likely TME 2/2 sepsis.  - As above    ADDENDUM: pt w/ short term memory loss, suspect element of dementia , will need further evaluation as outpt, check TSH.

## 2019-04-03 NOTE — H&P ADULT - ASSESSMENT
86yo M with a PMHx of remote colon ca s/p chemo and partial colectomy and BPH presenting with sepsis and toxic metabolic encephalopathy of unclear etiology, likely bacterial sinusitis vs viral URI 86yo M with a PMHx of remote colon ca s/p chemo and partial colectomy , TIA (2016) and BPH presenting with sepsis and toxic metabolic encephalopathy of unclear etiology, likely bacterial sinusitis vs viral URI

## 2019-04-03 NOTE — H&P ADULT - NSHPSOCIALHISTORY_GEN_ALL_CORE
, lives with wife  Lives in [address] [facility]  Former smoker, quit 40 years ago - 1ppd x20 years  Drinks wine most nights, unspecified quantity  Denies recreational drugs, OTC supplements

## 2019-04-03 NOTE — H&P ADULT - PROBLEM SELECTOR PLAN 1
Patient presenting with fever to 101.8 orally, leukocytosis >12.0, and presumed source of infection as either sinusitis or viral URI as CXR negative, UA negative, and Flu swab negative. Patient's wife reporting some confusion/TME at home which has improved with breaking of the fever. Lactate of 1.5 on admission. Received 1 dose of Vancomycin and 1 dose of Zosyn in the ED.  - Patient s/p 2000cc NS bolus, appears euvolemic  - Reperfusion study performed with capillary refill < 2 seconds  - f/u blood cultures  - f/u RVP  - Tylenol PRN for fever > 100.4F Patient presenting with fever to 101.8 orally, leukocytosis >12.0, and presumed source of infection as either sinusitis or viral URI as CXR negative, UA negative, and Flu swab negative. Patient's wife reporting some confusion/TME at home which has improved with breaking of the fever. Lactate of 1.5 on admission. Received 1 dose of Vancomycin and 1 dose of Zosyn in the ED.  - Patient s/p 2000cc NS bolus, appears euvolemic  - Reperfusion study performed with capillary refill < 2 seconds  - f/u blood cultures  - f/u RVP  - Tylenol PRN for fever > 100.4F    ADDENDUM: mild pyuria on repeat UA, done after hammer placement for urinary retention noted on bladder scan, pt w/ 500cc urine via straight cath. Concern for UTI given lack of URI/ sinus findings, will give a dose of ceftriaxone and followup uctx. agree w/ checking urine cytology. given urinary retention sxs will check sonogram of kidneys and bladder.

## 2019-04-03 NOTE — H&P ADULT - PROBLEM SELECTOR PLAN 4
Patient and patient's wife complaining of urinary incontinence x2 days with multiple trips to the bathroom. Patient with mild abdominal pain on exam in suprapubic region. Unclear if overflow incontinence.  - Pending bladder scan - if any evidence of retention will place Graves catheter Patient with history of BPH, does not report taking any medications. Some abdominal tenderness on examination, pending bladder scan.  - Flomax 0.4mg QHS  - Given hematuria on UA, obtain urine cytology  - Will refer to Urology for follow-up as has been lost to follow-up from prior Urologist Patient and patient's wife complaining of urinary incontinence x2 days with multiple trips to the bathroom. Patient with mild abdominal pain on exam in suprapubic region. Unclear if overflow incontinence.  - Pending bladder scan - if any evidence of retention will place Graves catheter    ADDENDUM: check renal and bladder sonogram

## 2019-04-03 NOTE — H&P ADULT - PROBLEM SELECTOR PLAN 9
1) PCP Contacted on Admission: (Y/N) -->Y  Name & Phone #: Hair Hull [phone]  2) Date of Contact with PCP: 4/3/19 at 19:05  3) PCP Contacted at Discharge: (Y/N, N/A)  4) Summary of Handoff Given to PCP:   5) Post-Discharge Appointment Date and Location: DELILAH WHITET 1) PCP Contacted on Admission: (Y/N) -->Y  Name & Phone #: Hair Hull [phone]  2) Date of Contact with PCP: 4/3/19 at 19:05  3) PCP Contacted at Discharge: (Y/N, N/A)  4) Summary of Handoff Given to PCP:   5) Post-Discharge Appointment Date and Location: Regular diet  No further IVF at this time  Replete electrolytes K>4; Mg>2    #PPX: HSQ  MVT

## 2019-04-03 NOTE — H&P ADULT - PROBLEM SELECTOR PLAN 3
Patient with fever, unclear etiology at this time. Possible bacterial sinusitis vs viral URI.  - As above Patient and patient's wife complaining of urinary incontinence x2 days with multiple trips to the bathroom. Patient with mild abdominal pain on exam in suprapubic region. Unclear if overflow incontinence.  - Pending bladder scan - if any evidence of retention will place Graves catheter ADDENDUM: +RVP for coronavirus , supportive care

## 2019-04-03 NOTE — H&P ADULT - NSICDXPASTMEDICALHX_GEN_ALL_CORE_FT
PAST MEDICAL HISTORY:  BPH (benign prostatic hyperplasia)     Colon cancer     Sepsis 2/2 UTI- December 2013 PAST MEDICAL HISTORY:  BPH (benign prostatic hyperplasia)     Brain TIA     Colon cancer     Sepsis 2/2 UTI- December 2013

## 2019-04-03 NOTE — ED PROVIDER NOTE - CLINICAL SUMMARY MEDICAL DECISION MAKING FREE TEXT BOX
Patient in ED w fever and generalized weakness.  Wife is concerned for UTI, noting history of bad UTI with similar presentation in the past.  She notes he has not been acting himself and is very weak.  Symptoms began yesterday and did not improved today Patient in ED w fever and generalized weakness.  Wife is concerned for UTI, noting history of bad UTI with similar presentation in the past.  She notes he has not been acting himself and is very weak.  Symptoms began yesterday and did not improved today, prompting his ED visit.  Patient is arousable on ED arrival, and able to answer questions appropriately.  Denies specific complaints aside from general weakness.  Labs including flu swabs sent.  CXR unremarkable.  Slight leukocytosis noted.  Given fever and presentation, broad spectrum abx therapy initiated in ED.  Patient and wife at bedside aware of all results and recommendation for admission to continue IVF, monitor, etc.  Patient and wife agreeable to plan.  Will admit at this time.

## 2019-04-03 NOTE — H&P ADULT - ATTENDING COMMENTS
patient seen and examined; pt reports to me of having HAs and sneezing x 2 weeks, brought into hospital for weakness and inability to get out to bed.     reviewed data including:  labs, available radiological reports/ studies, ekg, previous admission chart notes and/or imaging      agree w/ PE findings as above w/ additions/ exceptions/ pertinent findings: awake, alert, NAD; dry MM, no sinus tenderness b/l;  s1s2, lungs cta b/l, abdomen soft/nt/nd; +hammer catheter;  rectal exam reveals nontender nonboggy prostate;  trace lower ext edema b/l; pt oriented to person, forgetful of place, stated month was december and year was 2018     1. sepsis w/ mild pyuria, urinary incontinence: s/p vancomycin and zosyn; will give dose of IV ceftriaxone and followup ctxs, check renal sonogram. followup RVP ; given PO fluids at bedside, encourage PO fluid intake     rest of plan as above patient seen and examined; pt reports to me of having HAs and sneezing x 2 weeks, brought into hospital for weakness and inability to get out to bed.     reviewed data including:  labs, available radiological reports/ studies, ekg, previous admission chart notes and/or imaging      agree w/ PE findings as above w/ additions/ exceptions/ pertinent findings: awake, alert, NAD; dry MM, no sinus tenderness b/l;  s1s2, lungs cta b/l, abdomen soft/nt/nd; +hammer catheter;  rectal exam reveals nontender nonboggy prostate;  trace lower ext edema b/l; pt oriented to person, forgetful of place, stated month was december and year was 2018     1. sepsis w/ mild pyuria, urinary incontinence: s/p vancomycin and zosyn; will give dose of IV ceftriaxone and followup ctxs, check renal sonogram. followup RVP ; given PO fluids at bedside, encourage PO fluid intake     rest of plan as above    ADDENDUM: +RVP for coronavirus, likely cause of sxs, supportive care

## 2019-04-04 DIAGNOSIS — G45.9 TRANSIENT CEREBRAL ISCHEMIC ATTACK, UNSPECIFIED: ICD-10-CM

## 2019-04-04 DIAGNOSIS — B34.2 CORONAVIRUS INFECTION, UNSPECIFIED: ICD-10-CM

## 2019-04-04 LAB
ANION GAP SERPL CALC-SCNC: 13 MMOL/L — SIGNIFICANT CHANGE UP (ref 5–17)
BASOPHILS # BLD AUTO: 0.05 K/UL — SIGNIFICANT CHANGE UP (ref 0–0.2)
BASOPHILS NFR BLD AUTO: 0.4 % — SIGNIFICANT CHANGE UP (ref 0–2)
BLD GP AB SCN SERPL QL: NEGATIVE — SIGNIFICANT CHANGE UP
BUN SERPL-MCNC: 17 MG/DL — SIGNIFICANT CHANGE UP (ref 7–23)
CALCIUM SERPL-MCNC: 8.7 MG/DL — SIGNIFICANT CHANGE UP (ref 8.4–10.5)
CHLORIDE SERPL-SCNC: 102 MMOL/L — SIGNIFICANT CHANGE UP (ref 96–108)
CO2 SERPL-SCNC: 21 MMOL/L — LOW (ref 22–31)
CREAT SERPL-MCNC: 1.18 MG/DL — SIGNIFICANT CHANGE UP (ref 0.5–1.3)
CULTURE RESULTS: NO GROWTH — SIGNIFICANT CHANGE UP
EOSINOPHIL # BLD AUTO: 0.04 K/UL — SIGNIFICANT CHANGE UP (ref 0–0.5)
EOSINOPHIL NFR BLD AUTO: 0.3 % — SIGNIFICANT CHANGE UP (ref 0–6)
FERRITIN SERPL-MCNC: 189 NG/ML — SIGNIFICANT CHANGE UP (ref 30–400)
GLUCOSE SERPL-MCNC: 102 MG/DL — HIGH (ref 70–99)
HCOV PNL SPEC NAA+PROBE: DETECTED
HCT VFR BLD CALC: 39.7 % — SIGNIFICANT CHANGE UP (ref 39–50)
HGB BLD-MCNC: 12.5 G/DL — LOW (ref 13–17)
IMM GRANULOCYTES NFR BLD AUTO: 0.5 % — SIGNIFICANT CHANGE UP (ref 0–1.5)
IRON SATN MFR SERPL: 18 UG/DL — LOW (ref 45–165)
IRON SATN MFR SERPL: 9 % — LOW (ref 16–55)
LYMPHOCYTES # BLD AUTO: 1.25 K/UL — SIGNIFICANT CHANGE UP (ref 1–3.3)
LYMPHOCYTES # BLD AUTO: 9.1 % — LOW (ref 13–44)
MAGNESIUM SERPL-MCNC: 2.2 MG/DL — SIGNIFICANT CHANGE UP (ref 1.6–2.6)
MCHC RBC-ENTMCNC: 30.9 PG — SIGNIFICANT CHANGE UP (ref 27–34)
MCHC RBC-ENTMCNC: 31.5 GM/DL — LOW (ref 32–36)
MCV RBC AUTO: 98 FL — SIGNIFICANT CHANGE UP (ref 80–100)
MONOCYTES # BLD AUTO: 1.37 K/UL — HIGH (ref 0–0.9)
MONOCYTES NFR BLD AUTO: 10 % — SIGNIFICANT CHANGE UP (ref 2–14)
NEUTROPHILS # BLD AUTO: 10.97 K/UL — HIGH (ref 1.8–7.4)
NEUTROPHILS NFR BLD AUTO: 79.7 % — HIGH (ref 43–77)
NRBC # BLD: 0 /100 WBCS — SIGNIFICANT CHANGE UP (ref 0–0)
PLATELET # BLD AUTO: 215 K/UL — SIGNIFICANT CHANGE UP (ref 150–400)
POTASSIUM SERPL-MCNC: 3.9 MMOL/L — SIGNIFICANT CHANGE UP (ref 3.5–5.3)
POTASSIUM SERPL-SCNC: 3.9 MMOL/L — SIGNIFICANT CHANGE UP (ref 3.5–5.3)
RAPID RVP RESULT: DETECTED
RBC # BLD: 4.05 M/UL — LOW (ref 4.2–5.8)
RBC # FLD: 14.4 % — SIGNIFICANT CHANGE UP (ref 10.3–14.5)
RH IG SCN BLD-IMP: POSITIVE — SIGNIFICANT CHANGE UP
SODIUM SERPL-SCNC: 136 MMOL/L — SIGNIFICANT CHANGE UP (ref 135–145)
SPECIMEN SOURCE: SIGNIFICANT CHANGE UP
TIBC SERPL-MCNC: 198 UG/DL — LOW (ref 220–430)
TROPONIN T SERPL-MCNC: <0.01 NG/ML — SIGNIFICANT CHANGE UP (ref 0–0.01)
TSH SERPL-MCNC: 0.06 UIU/ML — LOW (ref 0.35–4.94)
UIBC SERPL-MCNC: 180 UG/DL — SIGNIFICANT CHANGE UP (ref 110–370)
WBC # BLD: 13.75 K/UL — HIGH (ref 3.8–10.5)
WBC # FLD AUTO: 13.75 K/UL — HIGH (ref 3.8–10.5)

## 2019-04-04 PROCEDURE — 99233 SBSQ HOSP IP/OBS HIGH 50: CPT | Mod: GC

## 2019-04-04 RX ORDER — ASPIRIN/CALCIUM CARB/MAGNESIUM 324 MG
81 TABLET ORAL DAILY
Qty: 0 | Refills: 0 | Status: DISCONTINUED | OUTPATIENT
Start: 2019-04-04 | End: 2019-04-05

## 2019-04-04 RX ORDER — CEFTRIAXONE 500 MG/1
1 INJECTION, POWDER, FOR SOLUTION INTRAMUSCULAR; INTRAVENOUS ONCE
Qty: 0 | Refills: 0 | Status: COMPLETED | OUTPATIENT
Start: 2019-04-04 | End: 2019-04-04

## 2019-04-04 RX ORDER — LANOLIN ALCOHOL/MO/W.PET/CERES
3 CREAM (GRAM) TOPICAL ONCE
Qty: 0 | Refills: 0 | Status: COMPLETED | OUTPATIENT
Start: 2019-04-04 | End: 2019-04-04

## 2019-04-04 RX ORDER — FERROUS SULFATE 325(65) MG
325 TABLET ORAL DAILY
Qty: 0 | Refills: 0 | Status: DISCONTINUED | OUTPATIENT
Start: 2019-04-04 | End: 2019-04-05

## 2019-04-04 RX ORDER — POTASSIUM CHLORIDE 20 MEQ
20 PACKET (EA) ORAL ONCE
Qty: 0 | Refills: 0 | Status: COMPLETED | OUTPATIENT
Start: 2019-04-04 | End: 2019-04-04

## 2019-04-04 RX ORDER — SODIUM CHLORIDE 9 MG/ML
1000 INJECTION INTRAMUSCULAR; INTRAVENOUS; SUBCUTANEOUS
Qty: 0 | Refills: 0 | Status: DISCONTINUED | OUTPATIENT
Start: 2019-04-04 | End: 2019-04-04

## 2019-04-04 RX ADMIN — Medication 325 MILLIGRAM(S): at 12:15

## 2019-04-04 RX ADMIN — HEPARIN SODIUM 5000 UNIT(S): 5000 INJECTION INTRAVENOUS; SUBCUTANEOUS at 13:44

## 2019-04-04 RX ADMIN — Medication 3 MILLIGRAM(S): at 02:26

## 2019-04-04 RX ADMIN — Medication 81 MILLIGRAM(S): at 11:08

## 2019-04-04 RX ADMIN — SODIUM CHLORIDE 80 MILLILITER(S): 9 INJECTION INTRAMUSCULAR; INTRAVENOUS; SUBCUTANEOUS at 12:15

## 2019-04-04 RX ADMIN — HEPARIN SODIUM 5000 UNIT(S): 5000 INJECTION INTRAVENOUS; SUBCUTANEOUS at 21:54

## 2019-04-04 RX ADMIN — HEPARIN SODIUM 5000 UNIT(S): 5000 INJECTION INTRAVENOUS; SUBCUTANEOUS at 06:55

## 2019-04-04 RX ADMIN — CEFTRIAXONE 100 GRAM(S): 500 INJECTION, POWDER, FOR SOLUTION INTRAMUSCULAR; INTRAVENOUS at 02:25

## 2019-04-04 RX ADMIN — Medication 20 MILLIEQUIVALENT(S): at 11:08

## 2019-04-04 RX ADMIN — TAMSULOSIN HYDROCHLORIDE 0.4 MILLIGRAM(S): 0.4 CAPSULE ORAL at 21:54

## 2019-04-04 RX ADMIN — Medication 1 TABLET(S): at 11:08

## 2019-04-04 NOTE — PROGRESS NOTE ADULT - PROBLEM SELECTOR PLAN 5
Patient with remote history of colon cancer, diagnosed in 1996 with short course of chemotherapy via chemoport and laparoscopic resection. No further treatment has been performed.   - Chemoport without erythema or tenderness  - f/u blood cultures, if bacteremic would consider as source  - Per patient, no plan for further chemotherapy, if bacteremic would consider removal

## 2019-04-04 NOTE — PROGRESS NOTE ADULT - PROBLEM SELECTOR PLAN 7
Patient with normocytic anemia of 12.6, appears chronic and without change since prior studies dating back to 2016. Possible BHAVYA vs ACD.  - Iron studies in AM  - Maintain active T&S, maintain IV access Patient with normocytic anemia of 12.6, appears chronic and without change since prior studies dating back to 2016. Possible BHAVYA vs ACD. Normocytic. Likely mixed picture per iron studies--likely BHAVYA with ACD.   - fu B12, folate  - will start ferrous sulfate tabs  - Maintain active T&S, transfuse Hb <7

## 2019-04-04 NOTE — PROGRESS NOTE ADULT - ATTENDING COMMENTS
Patient was seen and examined with the resident team today.  I agree with Dr. Crawford's assessment and plan with the following exceptions/additions:     Briefly, this is an 84yo gentleman with a PMH of colonCa (dx 1996, s/p chemo and partial colectomy) and BPH who p/w two days of fever, weakness, confusion and incontinence (overflow v stress) and subsequently found to have sepsis 2/2 Coronavirus and microscopic hematuria, as well as undetectable TSH.  ROB.  Reports he's in better spirits this AM.  VSS.  Exam - NAD, NCAT, MMM, clear OP, CTA B no w/r/r, RRR no m/g/r, +BS soft NTND, WWP no c/c/e, AOx3 but a bit tangential and forgetful.  Labs - WBC 13.75, Hb 12.5, plt 215, Cr 1.18, TSH 0.056.  Microdata - RVP +Coronavirus, bcx x2 NGTD, ucx NGTD.    -- c/w supportive care for URI  -- clarify baseline MS w/wife as appears better in comparison documentation this AM  -- f/u remainder of TFTs  -- f/u urine cytology; will need Urology follow-up  -- can start PO iron for BHAVYA  -- DVT PPx - SQH  -- Dispo - pending discussion w/wife, today v tomorrow    Nely Mccormick  [phone] Patient was seen and examined with the resident team today.  I agree with Dr. Crawford's assessment and plan with the following exceptions/additions:     Briefly, this is an 86yo gentleman with a PMH of colonCa (dx 1996, s/p chemo and partial colectomy) and BPH who p/w two days of fever, weakness, confusion and incontinence (overflow v stress) and subsequently found to have sepsis 2/2 Coronavirus w/infectious encephalopathy and microscopic hematuria, as well as undetectable TSH.  ROB.  Reports he's in better spirits this AM.  VSS.  Exam - NAD, NCAT, MMM, clear OP, CTA B no w/r/r, RRR no m/g/r, +BS soft NTND, WWP no c/c/e, AOx3 but a bit tangential and forgetful.  Labs - WBC 13.75, Hb 12.5, plt 215, Cr 1.18, TSH 0.056.  Microdata - RVP +Coronavirus, bcx x2 NGTD, ucx NGTD.    -- c/w supportive care for URI  -- clarify baseline MS w/wife as appears better in comparison documentation this AM  -- f/u remainder of TFTs  -- f/u urine cytology; will need Urology follow-up  -- can start PO iron for BHAVYA  -- DVT PPx - SQH  -- Dispo - pending discussion w/wife, today v tomorrow    Nely Mccormick  [phone] Patient was seen and examined with the resident team today.  I agree with Dr. Crawford's assessment and plan with the following exceptions/additions:     Briefly, this is an 84yo gentleman with a PMH of colonCa (dx 1996, s/p chemo and partial colectomy) and BPH who p/w two days of fever, weakness, confusion and incontinence (overflow v stress) and subsequently found to have sepsis 2/2 Coronavirus w/infectious encephalopathy and microscopic hematuria, as well as undetectable TSH.  ROB.  Reports he's in better spirits this AM.  VSS.  Exam - NAD, NCAT, MMM, clear OP, CTA B no w/r/r, RRR no m/g/r, +BS soft NTND, WWP no c/c/e, AOx3 but a bit tangential and forgetful.  Labs - WBC 13.75, Hb 12.5, plt 215, Cr 1.18, TSH 0.056.  Microdata - RVP +Coronavirus, bcx x2 NGTD, ucx NGTD.    -- c/w supportive care for URI  -- clarify baseline MS w/wife as appears better in comparison documentation this AM  -- f/u remainder of TFTs; likely has subclinical hypothyroidism  -- f/u urine cytology; will need Urology follow-up  -- can start PO iron for BHAVYA  -- DVT PPx - SQH  -- Dispo - pending discussion w/wife, today v tomorrow    Nely Mccormick  [phone]

## 2019-04-04 NOTE — PROGRESS NOTE ADULT - PROBLEM SELECTOR PLAN 3
Patient and patient's wife complaining of urinary incontinence x2 days with multiple trips to the bathroom. Patient with mild abdominal pain on exam in suprapubic region. Unclear if overflow incontinence.  - Pending bladder scan - if any evidence of retention will place Graves catheter    ADDENDUM: check renal and bladder sonogram Patient and patient's wife complaining of urinary incontinence x2 days with multiple trips to the bathroom. Patient with mild abdominal pain on exam in suprapubic region. Unclear if overflow incontinence. Bladder scan in the ED showing 500cc retention, hammer placed  -Pt very uncomfortable with hammer this AM.  -TOV today

## 2019-04-04 NOTE — PHYSICAL THERAPY INITIAL EVALUATION ADULT - CRITERIA FOR SKILLED THERAPEUTIC INTERVENTIONS
rehab potential/anticipated equipment needs at discharge/anticipated discharge recommendation/impairments found/therapy frequency

## 2019-04-04 NOTE — PROGRESS NOTE ADULT - PROBLEM SELECTOR PLAN 4
Patient with history of BPH, does not report taking any medications. Some abdominal tenderness on examination, pending bladder scan.  - Flomax 0.4mg QHS  - Given hematuria on UA, obtain urine cytology  - Will refer to Urology for follow-up as has been lost to follow-up from prior Urologist Patient with history of BPH, does not report taking any medications. Some abdominal tenderness on examination, pending bladder scan.  - started Flomax 0.4mg QHS  - Given hematuria on UA, fu urine cytology  - Will refer to Urology for follow-up outpatient as has been lost to follow-up from prior Urologist

## 2019-04-04 NOTE — PROGRESS NOTE ADULT - PROBLEM SELECTOR PLAN 1
Patient presenting with fever to 101.8 orally, leukocytosis >12.0. 2/2 coronavirus. UA negative, Flu swab negative, CXR clear. Lactate 1.5 on admission. s/p 2000cc NS bolus Ed. Appears hypvolemic still, will c/w maint, appears euvolemic  - Reperfusion study performed with capillary refill < 2 seconds  - f/u blood cultures  - f/u RVP  - Tylenol PRN for fever > 100.4F Patient presenting with fever to 101.8 orally, leukocytosis >12.0. 2/2 coronavirus. UA negative, Flu swab negative, CXR clear. Lactate 1.5 on admission. s/p 2000cc NS bolus, vanc x1, zosyn x1 in the ED. Reperfusion study performed with capillary refill < 2 seconds  -pt appears hypovolemic on exam this morning. Will start NS 80cc/hr  - f/u blood cultures, urine cultures  -supportive care for coronavirus     #coronavirus  -plan as above Patient presenting with fever to 101.8 orally, leukocytosis >12.0. 2/2 coronavirus. UA negative, Flu swab negative, CXR clear. Lactate 1.5 on admission. s/p 2000cc NS bolus, vanc x1, zosyn x1 in the ED. Reperfusion study performed with capillary refill < 2 seconds  -pt appears hypovolemic on exam this morning. Will start NS 80cc/hr  - blood cultures, urine cultures NGTD  -supportive care for coronavirus, encourage rest and PO intake    #coronavirus  -plan as above

## 2019-04-04 NOTE — PHYSICAL THERAPY INITIAL EVALUATION ADULT - ADDITIONAL COMMENTS
Patient reports that he lives with his wife. Wife at bedside states that there are 3 steps to enter the building, then 2 flights of about 19 steps each to their apartment. Patient and wife state that the patient was independent prior to admission, patient leaves the home multiple times per day (walks the dog, does local errands).

## 2019-04-04 NOTE — PROGRESS NOTE ADULT - SUBJECTIVE AND OBJECTIVE BOX
ON: per RN pt was agitated, pulling at hammer catheter.    Subjective: Pt seen and examined at bedside. Endorses diffuse joint pain and pain at hammer site. Also reports he has had recent memory loss, generalized weakness. Denies headache, chest pain, SOB, AP.     .  VITAL SIGNS:  T(C): 36.4 (04-04-19 @ 09:22), Max: 38.8 (04-03-19 @ 13:40)  T(F): 97.5 (04-04-19 @ 09:22), Max: 101.8 (04-03-19 @ 13:40)  HR: 61 (04-04-19 @ 09:22) (61 - 89)  BP: 110/70 (04-04-19 @ 09:22) (110/70 - 123/72)  BP(mean): --  RR: 12 (04-04-19 @ 09:22) (12 - 18)  SpO2: 96% (04-04-19 @ 09:22) (95% - 97%)  Wt(kg): --    PHYSICAL EXAM:  Constitutional: WDWN resting comfortably in bed; NAD  Head: NC/AT  Eyes: clear conjunctiva  ENT: no nasal discharge; dry MMM  Respiratory: CTA B/L; no W/R/R, no retractions  Cardiac: +S1/S2; RRR; no M/R/G  Gastrointestinal: soft, NT/ND; no rebound or guarding; normoactive BS  Genitourinary: hammer in place draining dark urine  Extremities: WWP, no clubbing or cyanosis; no peripheral edema  Dermatologic: skin warm, dry and intact; no rashes, wounds, or scars  Neurologic: AAOx3 but takes time to respond/word finding difficulty. Talkative/confabulating     MEDICATIONS  (STANDING):  aspirin  chewable 81 milliGRAM(s) Oral daily  heparin  Injectable 5000 Unit(s) SubCutaneous every 8 hours  multivitamin 1 Tablet(s) Oral daily  tamsulosin 0.4 milliGRAM(s) Oral at bedtime    MEDICATIONS  (PRN):  acetaminophen   Tablet .. 650 milliGRAM(s) Oral every 6 hours PRN Temp greater or equal to 38C (100.4F)    .  LABS:                         12.5   13.75 )-----------( 215      ( 04 Apr 2019 06:04 )             39.7     04-04    136  |  102  |  17  ----------------------------<  102<H>  3.9   |  21<L>  |  1.18    Ca    8.7      04 Apr 2019 06:05  Mg     2.2     04-04    TPro  7.2  /  Alb  3.7  /  TBili  0.5  /  DBili  x   /  AST  20  /  ALT  14  /  AlkPhos  60  04-03    PT/INR - ( 03 Apr 2019 14:26 )   PT: 13.7 sec;   INR: 1.21          PTT - ( 03 Apr 2019 14:26 )  PTT:30.7 sec  Urinalysis Basic - ( 03 Apr 2019 22:54 )    Color: Yellow / Appearance: Clear / SG: <=1.005 / pH: x  Gluc: x / Ketone: NEGATIVE  / Bili: Negative / Urobili: 0.2 E.U./dL   Blood: x / Protein: NEGATIVE mg/dL / Nitrite: NEGATIVE   Leuk Esterase: NEGATIVE / RBC: < 5 /HPF / WBC 5-10 /HPF   Sq Epi: x / Non Sq Epi: 0-5 /HPF / Bacteria: Present /HPF      CARDIAC MARKERS ( 04 Apr 2019 06:05 )  x     / <0.01 ng/mL / x     / x     / x                RADIOLOGY, EKG & ADDITIONAL TESTS: Reviewed.

## 2019-04-04 NOTE — PROGRESS NOTE ADULT - PROBLEM SELECTOR PLAN 9
1) PCP Contacted on Admission: (Y/N) -->Y  Name & Phone #: Hair Hull [phone]  2) Date of Contact with PCP: 4/3/19 at 19:05  3) PCP Contacted at Discharge: (Y/N, N/A)  4) Summary of Handoff Given to PCP:   5) Post-Discharge Appointment Date and Location: 1) PCP Contacted on Admission: (Y/N) -->Y  Name & Phone #: Hair Hull [phone]  2) Date of Contact with PCP: 4/3/19 at 19:05  3) PCP Contacted at Discharge: (Y/N, N/A)  4) Summary of Handoff Given to PCP:   5) Post-Discharge Appointment Date and Location:  -[facility] urology:

## 2019-04-04 NOTE — PROGRESS NOTE ADULT - PROBLEM SELECTOR PLAN 2
Patient with some fluctuation of mental status per wife, now back at baseline. Has happened in the past when patient had a UTI, likely TME 2/2 sepsis.  - As above    ADDENDUM: pt w/ short term memory loss, suspect element of dementia , will need further evaluation as outpt, check TSH. Patient with some fluctuation of mental status per wife, having hallucinations at home, now back at baseline. Pt also reports frustration with recent memory loss.  Has happened in the past when patient had a UTI. Differential includes sepsis, hyperthyroidism, intoxication, pseudodementia (pt aware of recent memory loss)  - plan as above for sepsis 2/2 coronavirus  -TSH low this AM. f/u T3 and T4  -fu RPR, B12, folate  -fu Utox Patient with some fluctuation of mental status per wife, having hallucinations at home. Pt also reports frustration with recent memory loss.  Has happened in the past when patient had a UTI. Differential includes sepsis, hyperthyroidism, intoxication, pseudodementia (pt aware of recent memory loss)  -per wife pt is back at baseline today  -AAOx3 today, however with word finding difficulty/slow to respond   - plan as above for sepsis 2/2 coronavirus  -TSH low this AM. f/u T3 and T4  -fu RPR, B12, folate  -fu Utox

## 2019-04-04 NOTE — PROGRESS NOTE ADULT - PROBLEM SELECTOR PLAN 8
Regular diet  No further IVF at this time  Replete electrolytes K>4; Mg>2    #PPX: HSQ  MVT F: Regular diet  E: Replete electrolytes K>4; Mg>2  N: regular diet    #PPX  VTE:  HSQ  dispo: RMF, pending PT recs  FULL CODE for now, need to discuss GOC with family F: Regular diet  E: Replete electrolytes K>4; Mg>2  N: regular diet    #PPX  VTE:  HSQ  dispo: RMF, pending PT recs  FULL CODE per wife, who is HCP

## 2019-04-04 NOTE — PROGRESS NOTE ADULT - ASSESSMENT
86yo M with a PMHx of remote colon ca s/p chemo and partial colectomy, TIA (2016) and BPH presenting with sepsis and toxic metabolic encephalopathy 2/2 coronavirus.

## 2019-04-04 NOTE — PROGRESS NOTE ADULT - PROBLEM SELECTOR PLAN 6
admitted in 2016 for TIA, per ashley notes, pt deferred statin at the time, pt appears to not be on statin currently, obtain collateral from PCP re: statin use; c/w ASA admitted in 2016 for TIA, per neuro notes, pt deferred statin at the time, pt appears to not be on statin currently  - c/w ASA  -consider starting statin if family amenable admitted in 2016 for TIA, per neuro notes, pt deferred statin at the time, pt appears to not be on statin currently  - c/w ASA  - per wife she does not want to start statin, pt has muscle cramps at baseline and she doesn't want to risk worsening it

## 2019-04-05 ENCOUNTER — INBOUND DOCUMENT (OUTPATIENT)
Age: 84
End: 2019-04-05

## 2019-04-05 ENCOUNTER — TRANSCRIPTION ENCOUNTER (OUTPATIENT)
Age: 84
End: 2019-04-05

## 2019-04-05 VITALS
TEMPERATURE: 99 F | DIASTOLIC BLOOD PRESSURE: 74 MMHG | OXYGEN SATURATION: 96 % | HEART RATE: 83 BPM | SYSTOLIC BLOOD PRESSURE: 128 MMHG | RESPIRATION RATE: 16 BRPM

## 2019-04-05 PROBLEM — N40.0 BENIGN PROSTATIC HYPERPLASIA WITHOUT LOWER URINARY TRACT SYMPTOMS: Chronic | Status: ACTIVE | Noted: 2019-04-03

## 2019-04-05 PROBLEM — G45.9 TRANSIENT CEREBRAL ISCHEMIC ATTACK, UNSPECIFIED: Chronic | Status: ACTIVE | Noted: 2019-04-04

## 2019-04-05 LAB
ANION GAP SERPL CALC-SCNC: 9 MMOL/L — SIGNIFICANT CHANGE UP (ref 5–17)
BUN SERPL-MCNC: 15 MG/DL — SIGNIFICANT CHANGE UP (ref 7–23)
CALCIUM SERPL-MCNC: 8.9 MG/DL — SIGNIFICANT CHANGE UP (ref 8.4–10.5)
CHLORIDE SERPL-SCNC: 107 MMOL/L — SIGNIFICANT CHANGE UP (ref 96–108)
CO2 SERPL-SCNC: 26 MMOL/L — SIGNIFICANT CHANGE UP (ref 22–31)
CREAT SERPL-MCNC: 1.05 MG/DL — SIGNIFICANT CHANGE UP (ref 0.5–1.3)
GLUCOSE SERPL-MCNC: 99 MG/DL — SIGNIFICANT CHANGE UP (ref 70–99)
HCT VFR BLD CALC: 38.9 % — LOW (ref 39–50)
HGB BLD-MCNC: 12.6 G/DL — LOW (ref 13–17)
MAGNESIUM SERPL-MCNC: 2.3 MG/DL — SIGNIFICANT CHANGE UP (ref 1.6–2.6)
MCHC RBC-ENTMCNC: 31.5 PG — SIGNIFICANT CHANGE UP (ref 27–34)
MCHC RBC-ENTMCNC: 32.4 GM/DL — SIGNIFICANT CHANGE UP (ref 32–36)
MCV RBC AUTO: 97.3 FL — SIGNIFICANT CHANGE UP (ref 80–100)
NRBC # BLD: 0 /100 WBCS — SIGNIFICANT CHANGE UP (ref 0–0)
PLATELET # BLD AUTO: 219 K/UL — SIGNIFICANT CHANGE UP (ref 150–400)
POTASSIUM SERPL-MCNC: 4.2 MMOL/L — SIGNIFICANT CHANGE UP (ref 3.5–5.3)
POTASSIUM SERPL-SCNC: 4.2 MMOL/L — SIGNIFICANT CHANGE UP (ref 3.5–5.3)
RBC # BLD: 4 M/UL — LOW (ref 4.2–5.8)
RBC # FLD: 14.2 % — SIGNIFICANT CHANGE UP (ref 10.3–14.5)
SODIUM SERPL-SCNC: 142 MMOL/L — SIGNIFICANT CHANGE UP (ref 135–145)
WBC # BLD: 8.74 K/UL — SIGNIFICANT CHANGE UP (ref 3.8–10.5)
WBC # FLD AUTO: 8.74 K/UL — SIGNIFICANT CHANGE UP (ref 3.8–10.5)

## 2019-04-05 PROCEDURE — 87086 URINE CULTURE/COLONY COUNT: CPT

## 2019-04-05 PROCEDURE — 87798 DETECT AGENT NOS DNA AMP: CPT

## 2019-04-05 PROCEDURE — 87581 M.PNEUMON DNA AMP PROBE: CPT

## 2019-04-05 PROCEDURE — 87631 RESP VIRUS 3-5 TARGETS: CPT

## 2019-04-05 PROCEDURE — 88112 CYTOPATH CELL ENHANCE TECH: CPT

## 2019-04-05 PROCEDURE — 87486 CHLMYD PNEUM DNA AMP PROBE: CPT

## 2019-04-05 PROCEDURE — 84443 ASSAY THYROID STIM HORMONE: CPT

## 2019-04-05 PROCEDURE — 84481 FREE ASSAY (FT-3): CPT

## 2019-04-05 PROCEDURE — 85027 COMPLETE CBC AUTOMATED: CPT

## 2019-04-05 PROCEDURE — 80053 COMPREHEN METABOLIC PANEL: CPT

## 2019-04-05 PROCEDURE — 96365 THER/PROPH/DIAG IV INF INIT: CPT

## 2019-04-05 PROCEDURE — 82746 ASSAY OF FOLIC ACID SERUM: CPT

## 2019-04-05 PROCEDURE — 96367 TX/PROPH/DG ADDL SEQ IV INF: CPT

## 2019-04-05 PROCEDURE — 85730 THROMBOPLASTIN TIME PARTIAL: CPT

## 2019-04-05 PROCEDURE — 36415 COLL VENOUS BLD VENIPUNCTURE: CPT

## 2019-04-05 PROCEDURE — 85025 COMPLETE CBC W/AUTO DIFF WBC: CPT

## 2019-04-05 PROCEDURE — 99239 HOSP IP/OBS DSCHRG MGMT >30: CPT

## 2019-04-05 PROCEDURE — 83540 ASSAY OF IRON: CPT

## 2019-04-05 PROCEDURE — 86850 RBC ANTIBODY SCREEN: CPT

## 2019-04-05 PROCEDURE — 99285 EMERGENCY DEPT VISIT HI MDM: CPT | Mod: 25

## 2019-04-05 PROCEDURE — 83605 ASSAY OF LACTIC ACID: CPT

## 2019-04-05 PROCEDURE — 82607 VITAMIN B-12: CPT

## 2019-04-05 PROCEDURE — 84439 ASSAY OF FREE THYROXINE: CPT

## 2019-04-05 PROCEDURE — 86901 BLOOD TYPING SEROLOGIC RH(D): CPT

## 2019-04-05 PROCEDURE — 85610 PROTHROMBIN TIME: CPT

## 2019-04-05 PROCEDURE — 83735 ASSAY OF MAGNESIUM: CPT

## 2019-04-05 PROCEDURE — 97161 PT EVAL LOW COMPLEX 20 MIN: CPT

## 2019-04-05 PROCEDURE — 87633 RESP VIRUS 12-25 TARGETS: CPT

## 2019-04-05 PROCEDURE — 84484 ASSAY OF TROPONIN QUANT: CPT

## 2019-04-05 PROCEDURE — 86780 TREPONEMA PALLIDUM: CPT

## 2019-04-05 PROCEDURE — 80048 BASIC METABOLIC PNL TOTAL CA: CPT

## 2019-04-05 PROCEDURE — 71045 X-RAY EXAM CHEST 1 VIEW: CPT

## 2019-04-05 PROCEDURE — 83550 IRON BINDING TEST: CPT

## 2019-04-05 PROCEDURE — 81001 URINALYSIS AUTO W/SCOPE: CPT

## 2019-04-05 PROCEDURE — 87040 BLOOD CULTURE FOR BACTERIA: CPT

## 2019-04-05 PROCEDURE — 82728 ASSAY OF FERRITIN: CPT

## 2019-04-05 PROCEDURE — 86900 BLOOD TYPING SEROLOGIC ABO: CPT

## 2019-04-05 RX ADMIN — Medication 81 MILLIGRAM(S): at 11:08

## 2019-04-05 RX ADMIN — Medication 325 MILLIGRAM(S): at 11:08

## 2019-04-05 RX ADMIN — Medication 1 TABLET(S): at 11:08

## 2019-04-05 RX ADMIN — HEPARIN SODIUM 5000 UNIT(S): 5000 INJECTION INTRAVENOUS; SUBCUTANEOUS at 06:43

## 2019-04-05 NOTE — DISCHARGE NOTE NURSING/CASE MANAGEMENT/SOCIAL WORK - NSDCPEPTSTRK_GEN_ALL_CORE
Stroke warning signs and symptoms/Signs and symptoms of stroke/Need for follow up after discharge/Prescribed medications/Risk factors for stroke/Call 911 for stroke/Stroke education booklet/Stroke support groups for patients, families, and friends

## 2019-04-05 NOTE — CONSULT NOTE ADULT - SUBJECTIVE AND OBJECTIVE BOX
Patient is a 85y old  Male who presents with a chief complaint of Sepsis (04 Apr 2019 09:17)       HPI:  Patient is an 84yo M with a PMHx of Colon Ca (dx 1996 w/short course of chemo and partial colectomy), BPH not on any medical therapy who presented to the ED complaining of two days of fever, URI, and urinary incontinence at home. Patient presents with his wife who states the patient has been slightly confused for the past two days which occurred with the onset of fever and has happened in the past with UTI. Patient states he has had some nasal congestion and a dry, non-productive cough for two days. He developed urinary incontinence and nocturia. He denies dysuria, hematuria, trouble starting or stopping the flow of urine, does not report stress incontinence, but feels the urge to go and cannot make it to the bathroom in time. He and his wife both deny any gait instability or difficulty walking. He ambulates up the stairs of his walk-up building without any assistance and walks his dog at least twice per day without any assistive devices. 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    In the ED, VS T 101.8, HR 89, /72, R 18, SpO2 97% on room air. Labs with leukocytosis of 12.6 with neutrophil predominance, normocytic anemia with Hgb of 12.6 (unchanged from prior), Cr of 1.27 (baseline 1.09), Lactate of 1.5. UA showing large blood with >10 RBC, no leukocyte esterase, no nitrates, <5 WBCs. CXR without overt infiltrate or effusion. Flu swab negative. Blood cultures obtained. EKG showing NSR. Patient was given a 2000cc NS bolus, 1000mg Acetaminophen, 3.375g of Zosyn and 1000mg Vancomycin. (03 Apr 2019 19:02)      PAST MEDICAL & SURGICAL HISTORY:  Brain TIA  BPH (benign prostatic hyperplasia)  Colon cancer  Sepsis: 2/2 UTI- December 2013  S/P colon resection      MEDICATIONS  (STANDING):  aspirin  chewable 81 milliGRAM(s) Oral daily  ferrous    sulfate 325 milliGRAM(s) Oral daily  heparin  Injectable 5000 Unit(s) SubCutaneous every 8 hours  multivitamin 1 Tablet(s) Oral daily  tamsulosin 0.4 milliGRAM(s) Oral at bedtime    MEDICATIONS  (PRN):  acetaminophen   Tablet .. 650 milliGRAM(s) Oral every 6 hours PRN Temp greater or equal to 38C (100.4F)      Social History: lives with his wife in a 3rd floor walkup apartment, 3 steps to enter lobby, no home care services    Functional Level Prior to Admission: ADL independent, walks without assistive devices    FAMILY HISTORY:  FH: myocardial infarction: father  FH: cancer: mother      CBC Full  -  ( 05 Apr 2019 07:47 )  WBC Count : 8.74 K/uL  RBC Count : 4.00 M/uL  Hemoglobin : 12.6 g/dL  Hematocrit : 38.9 %  Platelet Count - Automated : 219 K/uL  Mean Cell Volume : 97.3 fl  Mean Cell Hemoglobin : 31.5 pg  Mean Cell Hemoglobin Concentration : 32.4 gm/dL  Auto Neutrophil # : x  Auto Lymphocyte # : x  Auto Monocyte # : x  Auto Eosinophil # : x  Auto Basophil # : x  Auto Neutrophil % : x  Auto Lymphocyte % : x  Auto Monocyte % : x  Auto Eosinophil % : x  Auto Basophil % : x      04-05    142  |  107  |  15  ----------------------------<  99  4.2   |  26  |  1.05    Ca    8.9      05 Apr 2019 07:47  Mg     2.3     04-05    TPro  7.2  /  Alb  3.7  /  TBili  0.5  /  DBili  x   /  AST  20  /  ALT  14  /  AlkPhos  60  04-03      Urinalysis Basic - ( 03 Apr 2019 22:54 )    Color: Yellow / Appearance: Clear / SG: <=1.005 / pH: x  Gluc: x / Ketone: NEGATIVE  / Bili: Negative / Urobili: 0.2 E.U./dL   Blood: x / Protein: NEGATIVE mg/dL / Nitrite: NEGATIVE   Leuk Esterase: NEGATIVE / RBC: < 5 /HPF / WBC 5-10 /HPF   Sq Epi: x / Non Sq Epi: 0-5 /HPF / Bacteria: Present /HPF          Radiology:    < from: Xray Chest 1 View- PORTABLE-Urgent (04.03.19 @ 14:10) >  EXAM:  XR CHEST PORTABLE URGENT 1V                          PROCEDURE DATE:  04/03/2019          INTERPRETATION:  XR CHEST PORTABLE URGENT 1V dated 4/3/2019 2:10 PM    CLINICAL INFORMATION: sepsis    COMPARISON: November 8, 2016    FINDINGS: Heart size is normal. Right chest port catheter in place. There   is no focal consolidation or pleural effusion. There is no pneumothorax.   Osseous structures are unremarkable.    IMPRESSION: Clear lungs.              Vital Signs Last 24 Hrs  T(C): 37.1 (05 Apr 2019 09:04), Max: 37.1 (05 Apr 2019 09:04)  T(F): 98.8 (05 Apr 2019 09:04), Max: 98.8 (05 Apr 2019 09:04)  HR: 83 (05 Apr 2019 09:04) (71 - 83)  BP: 128/74 (05 Apr 2019 09:04) (101/62 - 153/74)  BP(mean): --  RR: 16 (05 Apr 2019 09:04) (16 - 16)  SpO2: 96% (05 Apr 2019 09:04) (96% - 98%)    REVIEW OF SYSTEMS:    CONSTITUTIONAL:  fatigue  EYES: No eye pain, visual disturbances, or discharge  ENMT:  No difficulty hearing, tinnitus, vertigo; No sinus or throat pain  NECK: No pain or stiffness  BREASTS: No pain, masses, or nipple discharge  RESPIRATORY: No cough, wheezing, chills or hemoptysis; No shortness of breath  CARDIOVASCULAR: No chest pain, palpitations, dizziness, or leg swelling  GASTROINTESTINAL: No abdominal or epigastric pain. No nausea, vomiting, or hematemesis; No diarrhea or constipation. No melena or hematochezia.  GENITOURINARY: No dysuria, frequency, hematuria, or incontinence  NEUROLOGICAL: No headaches, memory loss, loss of strength, numbness, or tremors  SKIN: No itching, burning, rashes, or lesions   LYMPH NODES: No enlarged glands  ENDOCRINE: No heat or cold intolerance; No hair loss  MUSCULOSKELETAL: No joint pain or swelling; No muscle, back, or extremity pain  PSYCHIATRIC: No depression, anxiety, mood swings, or difficulty sleeping  HEME/LYMPH: No easy bruising, or bleeding gums  ALLERGY AND IMMUNOLOGIC: No hives or eczema  VASCULAR: no swelling, erythema      Physical Exam: 86 yo  gentleman lying in semi Singh's, c/o feeling tired    Head: normocephalic, atraumatic    Eyes: PERRLA, EOMI, no nystagmus, sclera anicteric    ENT: nasal discharge, uvula midline, no oropharyngeal erythema/exudate    Neck: supple, negative JVD, negative carotid bruits, no thyromegaly    Chest: CTA bilaterally, neg wheeze, rhonchi, rales, crackles, egophany    Cardiovascular: regular rate and rhythm, neg murmurs/rubs/gallops    Abdomen: soft, non distended, mild suprapubic TTP,, negative rebound/guarding, normal bowel sounds, neg hepatosplenomegaly    Extremities: trace pedal edema, negative calf tenderness to palpation, negative Ashu's sign    :     Neurologic Exam:    Alert and oriented to person, place, date/year, speech fluent w/o dysarthria, recent and remote memory intact, repetition intact, comprehension intact,     Cranial Nerves:     II:                       pupils equal, round and reactive to light, visual fields intact   III/ IV/VI:            extraocular movements intact, neg nystagmus, ptosis  V:                       facial sensation intact, V1-3 normal  VII:                     face symmetric, no droop, normal eye closure and smile  VIII:                    hearing intact to finger rub bilaterally  IX/ X:                 soft palate rise symmetrical  XI:                      head turning, shoulder shrug normal  XII:                     tongue midline    Motor Exam:    Upper Extremities:     RIght:   no focal weakness               negative drift    Left :      no focal weakness               negative drift    Lower Extremities:                 Right:     no focal weakness    Left:        no focal weakness      Sensory:    intact to LT/PP in all UE/LE dermatomes    DTR:            = biceps/     triceps/     brachioradialis                      = patella/   medial hamstring/ankle                      neg clonus                      neg Babinski                      neg Hoffmans      Gait:  not tested        PM&R Impression:    1) deconditioned  2) no focal weakness  3) sepsis/ corona virus        Recommendations:    1) Physical therapy focusing on therapeutic exercises, bed mobility/transfer out of bed evaluation, progressive ambulation with assistive devices prn.    2) Anticipated Disposition Plan/Recs: d/c home with no post discharge rehab needs

## 2019-04-05 NOTE — DISCHARGE NOTE PROVIDER - NSDCFUADDAPPT_GEN_ALL_CORE_FT
Follow up with Dr. Hull.  We made an appointment for you to see Dr. Wong at [facility] on 4/11 at 2pm.

## 2019-04-05 NOTE — DISCHARGE NOTE NURSING/CASE MANAGEMENT/SOCIAL WORK - NSDCFUADDAPPT_GEN_ALL_CORE_FT
Follow up with Dr. Hull.  We made an appointment for you to see Dr. Wong at [facility] on 4/11 at 2pm.

## 2019-04-05 NOTE — DISCHARGE NOTE PROVIDER - CARE PROVIDER_API CALL
Becka Wong)  Urology  [address] [place] [address]  Phone: [phone]  Fax: [phone]  Follow Up Time:

## 2019-04-05 NOTE — DISCHARGE NOTE PROVIDER - HOSPITAL COURSE
86yo M with a PMHx of remote colon ca s/p chemo and partial colectomy, TIA (2016) and BPH admitted for sepsis and toxic metabolic encephalopathy 2/2 coronavirus. Patient presenting with fever to 101.8 orally, leukocytosis. UA negative, Flu swab negative, CXR clear. Lactate 1.5 on admission. s/p 2000cc NS bolus, vanc x1, zosyn x1 in the ED. Blood cultures, urine cultures NGTD. Sepsis was c/b infectious encephalopathy, but pt returned to baseline per wife. Of note pt was found to have low TSH, with normal T4 and low T3, can followup outpatient. Pt HD stable for discharge.

## 2019-04-05 NOTE — CONSULT NOTE ADULT - ASSESSMENT
84yo M with a PMHx of remote colon ca s/p chemo and partial colectomy, TIA (2016) and BPH presenting with sepsis and toxic metabolic encephalopathy 2/2 coronavirus.     Problem/Plan - 1:  ·  Problem: Sepsis.  Plan: Patient presenting with fever to 101.8 orally, leukocytosis >12.0. 2/2 coronavirus. UA negative, Flu swab negative, CXR clear. Lactate 1.5 on admission. s/p 2000cc NS bolus, vanc x1, zosyn x1 in the ED. Reperfusion study performed with capillary refill < 2 seconds  -pt appears hypovolemic on exam this morning. Will start NS 80cc/hr  - blood cultures, urine cultures NGTD  -supportive care for coronavirus, encourage rest and PO intake    #coronavirus  -plan as above.     Problem/Plan - 2:  ·  Problem: Toxic metabolic encephalopathy.  Plan: Patient with some fluctuation of mental status per wife, having hallucinations at home. Pt also reports frustration with recent memory loss.  Has happened in the past when patient had a UTI. Differential includes sepsis, hyperthyroidism, intoxication, pseudodementia (pt aware of recent memory loss)  -per wife pt is back at baseline today  -AAOx3 today, however with word finding difficulty/slow to respond   - plan as above for sepsis 2/2 coronavirus  -TSH low this AM. f/u T3 and T4  -fu RPR, B12, folate  -fu Utox.     Problem/Plan - 3:  ·  Problem: Urinary incontinence.  Plan: Patient and patient's wife complaining of urinary incontinence x2 days with multiple trips to the bathroom. Patient with mild abdominal pain on exam in suprapubic region. Unclear if overflow incontinence. Bladder scan in the ED showing 500cc retention, hammer placed  -Pt very uncomfortable with hammer this AM.  -TOV today.     Problem/Plan - 4:  ·  Problem: BPH (benign prostatic hyperplasia).  Plan: Patient with history of BPH, does not report taking any medications. Some abdominal tenderness on examination, pending bladder scan.  - started Flomax 0.4mg QHS  - Given hematuria on UA, fu urine cytology  - Will refer to Urology for follow-up outpatient as has been lost to follow-up from prior Urologist.     Problem/Plan - 5:  ·  Problem: Colon cancer.  Plan: Patient with remote history of colon cancer, diagnosed in 1996 with short course of chemotherapy via chemoport and laparoscopic resection. No further treatment has been performed.   - Chemoport without erythema or tenderness  - f/u blood cultures, if bacteremic would consider as source  - Per patient, no plan for further chemotherapy, if bacteremic would consider removal.     Problem/Plan - 6:  Problem: Brain TIA. Plan: admitted in 2016 for TIA, per neuro notes, pt deferred statin at the time, pt appears to not be on statin currently  - c/w ASA  - per wife she does not want to start statin, pt has muscle cramps at baseline and she doesn't want to risk worsening it.    Problem/Plan - 7:  ·  Problem: Normocytic anemia.  Plan: Patient with normocytic anemia of 12.6, appears chronic and without change since prior studies dating back to 2016. Possible BHAVYA vs ACD. Normocytic. Likely mixed picture per iron studies--likely BHAVYA with ACD.   - fu B12, folate  - will start ferrous sulfate tabs  - Maintain active T&S, transfuse Hb <7.

## 2019-04-05 NOTE — DISCHARGE NOTE PROVIDER - NSDCCPCAREPLAN_GEN_ALL_CORE_FT
PRINCIPAL DISCHARGE DIAGNOSIS  Diagnosis: Coronavirus infection  Assessment and Plan of Treatment: You came in altered and febrile. You were found to have metabolic encephalopathy from sepsis, from a viral infection. You were treated supportively and improved. Continue to rest an stay well hydrated. Follow up with Dr. Hull.      SECONDARY DISCHARGE DIAGNOSES  Diagnosis: Low TSH level  Assessment and Plan of Treatment: You were found to have a low TSH level. However you didn't have any symptoms from this. Follow up with Dr. Hull.    Diagnosis: Normocytic anemia  Assessment and Plan of Treatment: You were found to have anemia from low iron levels. Continue taking multivitamins which have iron in them. Follow up with Dr. Hull. He may start you on iron supplements.    Diagnosis: BPH (benign prostatic hyperplasia)  Assessment and Plan of Treatment: We made an appointment for you to see Dr. Wong at [facility] on 4/11 at 2pm.    Diagnosis: Brain TIA  Assessment and Plan of Treatment: Continue taking your aspirin. We also suggest taking a statin, but you and your wife have decided against it given your pre-existing muscle cramps. Follow up with Dr. Hull.

## 2019-04-05 NOTE — DISCHARGE NOTE NURSING/CASE MANAGEMENT/SOCIAL WORK - NSDCDPATPORTLINK_GEN_ALL_CORE
You can access the [facility] Portal, offered by [facility], by registering with the following website: [email or web address]

## 2019-04-05 NOTE — DISCHARGE NOTE PROVIDER - CARE PROVIDERS DIRECT ADDRESSES
[email or web address]

## 2019-04-08 LAB
CULTURE RESULTS: SIGNIFICANT CHANGE UP
CULTURE RESULTS: SIGNIFICANT CHANGE UP
SPECIMEN SOURCE: SIGNIFICANT CHANGE UP
SPECIMEN SOURCE: SIGNIFICANT CHANGE UP
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
## 2019-04-09 DIAGNOSIS — G89.28 OTHER CHRONIC POSTPROCEDURAL PAIN: ICD-10-CM

## 2019-04-09 DIAGNOSIS — Z90.49 ACQUIRED ABSENCE OF OTHER SPECIFIED PARTS OF DIGESTIVE TRACT: ICD-10-CM

## 2019-04-09 DIAGNOSIS — G62.9 POLYNEUROPATHY, UNSPECIFIED: ICD-10-CM

## 2019-04-09 DIAGNOSIS — J06.9 ACUTE UPPER RESPIRATORY INFECTION, UNSPECIFIED: ICD-10-CM

## 2019-04-09 DIAGNOSIS — D50.9 IRON DEFICIENCY ANEMIA, UNSPECIFIED: ICD-10-CM

## 2019-04-09 DIAGNOSIS — R33.8 OTHER RETENTION OF URINE: ICD-10-CM

## 2019-04-09 DIAGNOSIS — Z80.9 FAMILY HISTORY OF MALIGNANT NEOPLASM, UNSPECIFIED: ICD-10-CM

## 2019-04-09 DIAGNOSIS — M25.50 PAIN IN UNSPECIFIED JOINT: ICD-10-CM

## 2019-04-09 DIAGNOSIS — Z79.899 OTHER LONG TERM (CURRENT) DRUG THERAPY: ICD-10-CM

## 2019-04-09 DIAGNOSIS — B97.29 OTHER CORONAVIRUS AS THE CAUSE OF DISEASES CLASSIFIED ELSEWHERE: ICD-10-CM

## 2019-04-09 DIAGNOSIS — Z85.038 PERSONAL HISTORY OF OTHER MALIGNANT NEOPLASM OF LARGE INTESTINE: ICD-10-CM

## 2019-04-09 DIAGNOSIS — N40.1 BENIGN PROSTATIC HYPERPLASIA WITH LOWER URINARY TRACT SYMPTOMS: ICD-10-CM

## 2019-04-09 DIAGNOSIS — Z82.49 FAMILY HISTORY OF ISCHEMIC HEART DISEASE AND OTHER DISEASES OF THE CIRCULATORY SYSTEM: ICD-10-CM

## 2019-04-09 DIAGNOSIS — D63.8 ANEMIA IN OTHER CHRONIC DISEASES CLASSIFIED ELSEWHERE: ICD-10-CM

## 2019-04-09 DIAGNOSIS — R31.9 HEMATURIA, UNSPECIFIED: ICD-10-CM

## 2019-04-09 DIAGNOSIS — E86.1 HYPOVOLEMIA: ICD-10-CM

## 2019-04-09 DIAGNOSIS — Z92.21 PERSONAL HISTORY OF ANTINEOPLASTIC CHEMOTHERAPY: ICD-10-CM

## 2019-04-09 DIAGNOSIS — Z79.82 LONG TERM (CURRENT) USE OF ASPIRIN: ICD-10-CM

## 2019-04-09 DIAGNOSIS — Z28.89 IMMUNIZATION NOT CARRIED OUT FOR OTHER REASON: ICD-10-CM

## 2019-04-09 DIAGNOSIS — F03.90 UNSPECIFIED DEMENTIA WITHOUT BEHAVIORAL DISTURBANCE: ICD-10-CM

## 2019-04-09 DIAGNOSIS — Z95.828 PRESENCE OF OTHER VASCULAR IMPLANTS AND GRAFTS: ICD-10-CM

## 2019-04-09 DIAGNOSIS — Z86.73 PERSONAL HISTORY OF TRANSIENT ISCHEMIC ATTACK (TIA), AND CEREBRAL INFARCTION WITHOUT RESIDUAL DEFICITS: ICD-10-CM

## 2019-04-09 DIAGNOSIS — R94.6 ABNORMAL RESULTS OF THYROID FUNCTION STUDIES: ICD-10-CM

## 2019-04-09 DIAGNOSIS — A41.9 SEPSIS, UNSPECIFIED ORGANISM: ICD-10-CM

## 2019-04-09 DIAGNOSIS — R41.3 OTHER AMNESIA: ICD-10-CM

## 2019-04-09 DIAGNOSIS — R35.1 NOCTURIA: ICD-10-CM

## 2019-04-09 DIAGNOSIS — R32 UNSPECIFIED URINARY INCONTINENCE: ICD-10-CM

## 2019-04-09 DIAGNOSIS — Z87.891 PERSONAL HISTORY OF NICOTINE DEPENDENCE: ICD-10-CM

## 2019-04-09 DIAGNOSIS — G93.49 OTHER ENCEPHALOPATHY: ICD-10-CM

## 2019-04-09 DIAGNOSIS — G92 TOXIC ENCEPHALOPATHY: ICD-10-CM

## 2019-04-30 ENCOUNTER — APPOINTMENT (OUTPATIENT)
Dept: UROLOGY | Facility: CLINIC | Age: 84
End: 2019-04-30
Payer: MEDICARE

## 2019-04-30 VITALS
SYSTOLIC BLOOD PRESSURE: 114 MMHG | DIASTOLIC BLOOD PRESSURE: 69 MMHG | TEMPERATURE: 97.6 F | BODY MASS INDEX: 24.92 KG/M2 | WEIGHT: 184 LBS | HEIGHT: 72 IN | OXYGEN SATURATION: 98 % | HEART RATE: 65 BPM

## 2019-04-30 PROCEDURE — 99204 OFFICE O/P NEW MOD 45 MIN: CPT

## 2019-04-30 NOTE — PHYSICAL EXAM
[General Appearance - Well Developed] : well developed [General Appearance - Well Nourished] : well nourished [Normal Appearance] : normal appearance [Well Groomed] : well groomed [Heart Rate And Rhythm] : Heart rate and rhythm were normal [] : no respiratory distress [Abdomen Soft] : soft [Abdomen Tenderness] : non-tender [Abdomen Hernia] : no hernia was discovered [Costovertebral Angle Tenderness] : no ~M costovertebral angle tenderness [Urethral Meatus] : meatus normal [Penis Abnormality] : normal uncircumcised penis [Urinary Bladder Findings] : the bladder was normal on palpation [Scrotum] : the scrotum was normal [FreeTextEntry1] : left epididymal cyst. nontender testes [Normal Station and Gait] : the gait and station were normal for the patient's age [Skin Color & Pigmentation] : normal skin color and pigmentation [No Focal Deficits] : no focal deficits [Oriented To Time, Place, And Person] : oriented to person, place, and time [No Palpable Adenopathy] : no palpable adenopathy

## 2019-04-30 NOTE — HISTORY OF PRESENT ILLNESS
[FreeTextEntry1] : 85M hx colon ca with recent admission to [facility] with coronvavirus infection. noted to have few episodes urinary inconitnence. urine cultures all negative. incontnence symptmos self resolved that day. now back to normal. nocutira x 1. good FOS. no hematuria. no dysuria. occasional urgency. no family history prostate kindey bladder cacncer. IPSS 4. feels well overall. PVR today 70.

## 2019-04-30 NOTE — ASSESSMENT
[FreeTextEntry1] : uirnary incontinence\par resolved\par feels well\par PVR 70\par reassured\par f/u 1 year

## 2019-05-01 ENCOUNTER — APPOINTMENT (OUTPATIENT)
Dept: INTERNAL MEDICINE | Facility: CLINIC | Age: 84
End: 2019-05-01
Payer: MEDICARE

## 2019-05-01 VITALS
RESPIRATION RATE: 14 BRPM | HEART RATE: 82 BPM | HEIGHT: 72 IN | OXYGEN SATURATION: 95 % | BODY MASS INDEX: 24.92 KG/M2 | SYSTOLIC BLOOD PRESSURE: 118 MMHG | WEIGHT: 184 LBS | DIASTOLIC BLOOD PRESSURE: 70 MMHG | TEMPERATURE: 97.7 F

## 2019-05-01 LAB
APPEARANCE: CLEAR
BACTERIA: NEGATIVE
BILIRUBIN URINE: NEGATIVE
BLOOD URINE: NEGATIVE
COLOR: YELLOW
GLUCOSE QUALITATIVE U: NEGATIVE
HYALINE CASTS: 3 /LPF
KETONES URINE: NEGATIVE
LEUKOCYTE ESTERASE URINE: NEGATIVE
MICROSCOPIC-UA: NORMAL
NITRITE URINE: NEGATIVE
PH URINE: 5
PROTEIN URINE: NEGATIVE
RED BLOOD CELLS URINE: 2 /HPF
SPECIFIC GRAVITY URINE: 1.02
SQUAMOUS EPITHELIAL CELLS: 0 /HPF
UROBILINOGEN URINE: NORMAL
WHITE BLOOD CELLS URINE: 0 /HPF

## 2019-05-01 PROCEDURE — 36415 COLL VENOUS BLD VENIPUNCTURE: CPT

## 2019-05-01 PROCEDURE — 99214 OFFICE O/P EST MOD 30 MIN: CPT

## 2019-05-01 NOTE — PLAN
[FreeTextEntry1] : Fatigue- likely decompensation post hospitalization - may take 2-3 months\par  - check labs today\par \par Memory changes - continued redirection reviewed with wife\par \par f/up 2-3 months

## 2019-05-01 NOTE — PHYSICAL EXAM
[No Acute Distress] : no acute distress [Well Developed] : well developed [Well Nourished] : well nourished [Well-Appearing] : well-appearing [Normal Sclera/Conjunctiva] : normal sclera/conjunctiva [PERRL] : pupils equal round and reactive to light [EOMI] : extraocular movements intact [Normal Oropharynx] : the oropharynx was normal [Normal Outer Ear/Nose] : the outer ears and nose were normal in appearance [No JVD] : no jugular venous distention [Supple] : supple [No Lymphadenopathy] : no lymphadenopathy [Thyroid Normal, No Nodules] : the thyroid was normal and there were no nodules present [No Respiratory Distress] : no respiratory distress  [Clear to Auscultation] : lungs were clear to auscultation bilaterally [No Accessory Muscle Use] : no accessory muscle use [Normal Rate] : normal rate  [Regular Rhythm] : with a regular rhythm [Normal S1, S2] : normal S1 and S2 [No Murmur] : no murmur heard [Soft] : abdomen soft [Non Tender] : non-tender [Non-distended] : non-distended [No Masses] : no abdominal mass palpated [Normal Bowel Sounds] : normal bowel sounds [No HSM] : no HSM [Normal Anterior Cervical Nodes] : no anterior cervical lymphadenopathy [Normal Posterior Cervical Nodes] : no posterior cervical lymphadenopathy [No CVA Tenderness] : no CVA  tenderness [No Spinal Tenderness] : no spinal tenderness [No Joint Swelling] : no joint swelling [Grossly Normal Strength/Tone] : grossly normal strength/tone [No Rash] : no rash [Normal Gait] : normal gait [Coordination Grossly Intact] : coordination grossly intact [No Focal Deficits] : no focal deficits [Normal Affect] : the affect was normal [Deep Tendon Reflexes (DTR)] : deep tendon reflexes were 2+ and symmetric [Normal Insight/Judgement] : insight and judgment were intact [de-identified] : trace edema

## 2019-05-01 NOTE — REVIEW OF SYSTEMS
[Fatigue] : fatigue [Dyspnea on Exertion] : dyspnea on exertion [Joint Pain] : joint pain [Joint Stiffness] : joint stiffness [Negative] : Gastrointestinal [Sore Throat] : no sore throat [Chest Pain] : no chest pain [Palpitations] : no palpitations

## 2019-05-01 NOTE — HISTORY OF PRESENT ILLNESS
[FreeTextEntry1] : post hospitalization [de-identified] : Hospital Course	\par 84yo M with a PMHx of remote colon ca s/p chemo and partial colectomy, TIA (2016) and BPH admitted for sepsis and toxic metabolic encephalopathy 2/2 coronavirus. Patient presenting with fever to 101.8 orally, leukocytosis. UA negative, Flu swab negative, CXR clear. Lactate 1.5 on admission. s/p 2000cc NS bolus, vanc x1, zosyn x1 in the ED. Blood cultures, urine cultures NGTD. Sepsis was c/b infectious encephalopathy, but pt returned to baseline per wife. Of note pt was found to have low TSH, with normal T4 and low T3, can followup outpatient. Pt HD stable for discharge. \par \par Here today with wife.  Feels fatigued all of the time.   Wakes then sleeps \par Memory loss and cold intolerance.  \par Eating small amounts, no weight loss. Has seen Dr Bowden about this.\par reports knee pain and hip pain.

## 2019-05-02 LAB
ANION GAP SERPL CALC-SCNC: 12 MMOL/L
BACTERIA UR CULT: NORMAL
BASOPHILS # BLD AUTO: 0.1 K/UL
BASOPHILS NFR BLD AUTO: 1.3 %
BUN SERPL-MCNC: 26 MG/DL
CALCIUM SERPL-MCNC: 9.6 MG/DL
CHLORIDE SERPL-SCNC: 104 MMOL/L
CO2 SERPL-SCNC: 25 MMOL/L
CREAT SERPL-MCNC: 1.17 MG/DL
EOSINOPHIL # BLD AUTO: 0.38 K/UL
EOSINOPHIL NFR BLD AUTO: 5.1 %
FOLATE SERPL-MCNC: >20 NG/ML
GLUCOSE SERPL-MCNC: 82 MG/DL
HCT VFR BLD CALC: 39.9 %
HGB BLD-MCNC: 12.4 G/DL
IMM GRANULOCYTES NFR BLD AUTO: 0.7 %
IRON SATN MFR SERPL: 35 %
IRON SERPL-MCNC: 87 UG/DL
LYMPHOCYTES # BLD AUTO: 1.92 K/UL
LYMPHOCYTES NFR BLD AUTO: 25.6 %
MAN DIFF?: NORMAL
MCHC RBC-ENTMCNC: 30.9 PG
MCHC RBC-ENTMCNC: 31.1 GM/DL
MCV RBC AUTO: 99.5 FL
MONOCYTES # BLD AUTO: 0.92 K/UL
MONOCYTES NFR BLD AUTO: 12.3 %
NEUTROPHILS # BLD AUTO: 4.14 K/UL
NEUTROPHILS NFR BLD AUTO: 55 %
PLATELET # BLD AUTO: 287 K/UL
POTASSIUM SERPL-SCNC: 4.7 MMOL/L
RBC # BLD: 4.01 M/UL
RBC # FLD: 14.5 %
SODIUM SERPL-SCNC: 141 MMOL/L
T3FREE SERPL-MCNC: 3 PG/ML
T4 FREE SERPL-MCNC: 1.3 NG/DL
TIBC SERPL-MCNC: 252 UG/DL
TSH SERPL-ACNC: 3.43 UIU/ML
UIBC SERPL-MCNC: 165 UG/DL
VIT B12 SERPL-MCNC: 1522 PG/ML
WBC # FLD AUTO: 7.51 K/UL

## 2019-08-27 ENCOUNTER — APPOINTMENT (OUTPATIENT)
Dept: INTERVENTIONAL RADIOLOGY/VASCULAR | Facility: HOSPITAL | Age: 84
End: 2019-08-27
Payer: MEDICARE

## 2019-08-27 ENCOUNTER — OUTPATIENT (OUTPATIENT)
Dept: OUTPATIENT SERVICES | Facility: HOSPITAL | Age: 84
LOS: 1 days | End: 2019-08-27
Payer: MEDICARE

## 2019-08-27 DIAGNOSIS — Z90.49 ACQUIRED ABSENCE OF OTHER SPECIFIED PARTS OF DIGESTIVE TRACT: Chronic | ICD-10-CM

## 2019-08-27 PROCEDURE — 36590 REMOVAL TUNNELED CV CATH: CPT

## 2019-10-14 ENCOUNTER — APPOINTMENT (OUTPATIENT)
Dept: INTERNAL MEDICINE | Facility: CLINIC | Age: 84
End: 2019-10-14
Payer: MEDICARE

## 2019-10-14 VITALS
RESPIRATION RATE: 14 BRPM | TEMPERATURE: 97.6 F | SYSTOLIC BLOOD PRESSURE: 120 MMHG | WEIGHT: 180 LBS | DIASTOLIC BLOOD PRESSURE: 72 MMHG | BODY MASS INDEX: 24.38 KG/M2 | HEIGHT: 72 IN | HEART RATE: 74 BPM | OXYGEN SATURATION: 9 %

## 2019-10-14 PROCEDURE — 99214 OFFICE O/P EST MOD 30 MIN: CPT

## 2019-10-14 NOTE — PLAN
[FreeTextEntry1] : \par \par Memory loss - neuro eval and trial of donepezil\par \par Shoulde rpain - send for xray and referral for PT.

## 2019-10-14 NOTE — HISTORY OF PRESENT ILLNESS
[FreeTextEntry1] : shhoulder pain [de-identified] : right arma and shoulder pain for past 2 weeks. No trauma.  Advil did help a little.

## 2019-10-14 NOTE — PHYSICAL EXAM
[Normal] : no acute distress, well nourished, well developed and well-appearing [de-identified] : sits intact,  full rom,  point td posterior aspect of right deltoid.  no redness no warmth

## 2019-10-28 ENCOUNTER — FORM ENCOUNTER (OUTPATIENT)
Age: 84
End: 2019-10-28

## 2019-10-29 ENCOUNTER — OUTPATIENT (OUTPATIENT)
Dept: OUTPATIENT SERVICES | Facility: HOSPITAL | Age: 84
LOS: 1 days | End: 2019-10-29
Payer: MEDICARE

## 2019-10-29 DIAGNOSIS — Z90.49 ACQUIRED ABSENCE OF OTHER SPECIFIED PARTS OF DIGESTIVE TRACT: Chronic | ICD-10-CM

## 2019-10-29 PROCEDURE — 73030 X-RAY EXAM OF SHOULDER: CPT | Mod: 26,RT

## 2019-10-29 PROCEDURE — 73030 X-RAY EXAM OF SHOULDER: CPT

## 2019-11-07 ENCOUNTER — APPOINTMENT (OUTPATIENT)
Dept: NEUROLOGY | Facility: CLINIC | Age: 84
End: 2019-11-07
Payer: MEDICARE

## 2019-11-07 VITALS
DIASTOLIC BLOOD PRESSURE: 58 MMHG | HEART RATE: 80 BPM | WEIGHT: 180 LBS | OXYGEN SATURATION: 97 % | BODY MASS INDEX: 24.38 KG/M2 | SYSTOLIC BLOOD PRESSURE: 114 MMHG | HEIGHT: 72 IN

## 2019-11-07 DIAGNOSIS — G62.9 POLYNEUROPATHY, UNSPECIFIED: ICD-10-CM

## 2019-11-07 PROCEDURE — 99213 OFFICE O/P EST LOW 20 MIN: CPT

## 2019-11-07 RX ORDER — CAPECITABINE 500 MG/1
500 TABLET ORAL
Qty: 56 | Refills: 0 | Status: DISCONTINUED | COMMUNITY
Start: 2016-12-02 | End: 2019-11-07

## 2019-11-07 RX ORDER — NAPROXEN 500 MG/1
500 TABLET ORAL
Qty: 20 | Refills: 3 | Status: DISCONTINUED | COMMUNITY
Start: 2018-11-12 | End: 2019-11-07

## 2019-11-07 NOTE — CONSULT LETTER
[Dear  ___] : Dear  [unfilled], [Consult Letter:] : I had the pleasure of evaluating your patient, [unfilled]. [Please see my note below.] : Please see my note below. [Sincerely,] : Sincerely, [FreeTextEntry3] : Severiano Junior MD\par  Professor of Neurology\par  Director of Neurology Outreach Programs \par  [facility]\par

## 2019-11-07 NOTE — HISTORY OF PRESENT ILLNESS
[FreeTextEntry1] : This is an 85-year-old child with memory in 2016 he had cancer of the colon and was successfully operated on him since he came home he said difficult with memory he has short-term memory at times as confused and does things almost an inappropriate manner.\par \par He reports no new weakness involving his arms or legs although he is complaining of neuropathy in both lower extremities he also has an unsteady gait. Overall she is good health where he reports being quite active.\par \par Has no seizures at times gets somewhat disoriented in regards to following regular activities.\par \par He has no history of any trauma.

## 2019-11-07 NOTE — PHYSICAL EXAM
[FreeTextEntry1] : On examination he is a cooperative 85-year-old gentleman his wife was present during entire visit. He has difficulty with memory short-term memory long-term memory extract thinking.\par \par Cranial nerves show full extraocular movements fundi benign lower cranial nerves normal\par \par His gait is abnormal he has slowness of movement positive Romberg and has a tendency to fall. No footdrop to be noted\par \par Motor tone appears to be normal without cogwheeling or rigidity he has grossly normal strength and examining in bed.\par \par He has absent ankle jerks trace knee jerks toes appear to be downgoing position and vibratory sense is diminished in both lower extremities\par \par He has no evidence of any significant tremor or dysmetria.\par \par No cranial or carotid bruits are detectable.\par \par He has reduced range of motion involving his neck and low back area.

## 2019-11-14 ENCOUNTER — APPOINTMENT (OUTPATIENT)
Dept: NEUROLOGY | Facility: CLINIC | Age: 84
End: 2019-11-14
Payer: MEDICARE

## 2019-11-14 PROCEDURE — 95819 EEG AWAKE AND ASLEEP: CPT

## 2020-01-16 ENCOUNTER — APPOINTMENT (OUTPATIENT)
Dept: NEUROLOGY | Facility: CLINIC | Age: 85
End: 2020-01-16
Payer: MEDICARE

## 2020-01-16 VITALS
HEART RATE: 60 BPM | WEIGHT: 177 LBS | HEIGHT: 72 IN | DIASTOLIC BLOOD PRESSURE: 71 MMHG | OXYGEN SATURATION: 100 % | SYSTOLIC BLOOD PRESSURE: 120 MMHG | BODY MASS INDEX: 23.98 KG/M2

## 2020-01-16 PROCEDURE — 99215 OFFICE O/P EST HI 40 MIN: CPT

## 2020-01-16 RX ORDER — DONEPEZIL HYDROCHLORIDE 5 MG/1
5 TABLET ORAL
Qty: 30 | Refills: 11 | Status: DISCONTINUED | COMMUNITY
Start: 2019-10-14 | End: 2020-01-16

## 2020-01-16 NOTE — HISTORY OF PRESENT ILLNESS
[FreeTextEntry1] : This  is an   85   y.o.    male  who presented  accompanied  by  his   wife.   Major  concern  today  is   memory   issues.  Memory   issues   were    first   noticed in September 2016   a  few   days   after   the    surgery   (Colon  CA).    Reportedly     the Pt    had  a  few    TIAs   in the    past.\par \par MRI Brain -  11/22/2019 Age   related   involutional    changed. White   matter    findings    which   are  non  specific.   Partial   differential   may   include   foci   of  demyelination, ischemia  and  gliosis.  Evidence    for    hemosiderin   at te    Right    temporal    and  right    occipital    temporo-occipital   lobe    regions. \par \par EEG  on  11/14/2019  - c/w   potentially     epileptogenic   focal  area    of  cerebral  dysfunction    on the    Right     temporal  region     and   focal     area  of    cerebral   dysfunction   on  the   Left   temporal  region.  \par \par Of  note,  MRI of the  brain    in April 2018    was  normal   for   age. \par \par Pt   underwent    chemo (ox platinum)\par Pt is   known  to  have   a  chemo   induced  polyneuropathy.

## 2020-01-16 NOTE — REVIEW OF SYSTEMS
[As Noted in HPI] : as noted in HPI [Memory Lapses or Loss] : memory loss [Difficulty with Language] : ~M difficulty with language [Decr. Concentrating Ability] : decreased concentrating ability

## 2020-01-16 NOTE — PHYSICAL EXAM
[Paresis Pronator Drift Left-Sided] : a left-sided pronator drift was present [Dysdiadochokinesia On The Left] : present on the left side [Coordination - Dysmetria Impaired Finger-to-Nose Left Only] : present on the left side [General Appearance - Alert] : alert [General Appearance - In No Acute Distress] : in no acute distress [Oriented To Time, Place, And Person] : oriented to person, place, and time [Impaired Insight] : insight and judgment were intact [Affect] : the affect was normal [Person] : oriented to person [Place] : oriented to place [Concentration Intact] : normal concentrating ability [Time] : oriented to time [Visual Intact] : visual attention was ~T not ~L decreased [Naming Objects] : no difficulty naming common objects [Repeating Phrases] : no difficulty repeating a phrase [Fluency] : fluency intact [Writing A Sentence] : no difficulty writing a sentence [Reading] : reading intact [Comprehension] : comprehension intact [Past History] : adequate knowledge of personal past history [Cranial Nerves Optic (II)] : visual acuity intact bilaterally,  visual fields full to confrontation, pupils equal round and reactive to light [Cranial Nerves Trigeminal (V)] : facial sensation intact symmetrically [Cranial Nerves Oculomotor (III)] : extraocular motion intact [Cranial Nerves Facial (VII)] : face symmetrical [Cranial Nerves Glossopharyngeal (IX)] : tongue and palate midline [Cranial Nerves Vestibulocochlear (VIII)] : hearing was intact bilaterally [Motor Tone] : muscle tone was normal in all four extremities [Cranial Nerves Hypoglossal (XII)] : there was no tongue deviation with protrusion [Cranial Nerves Accessory (XI - Cranial And Spinal)] : head turning and shoulder shrug symmetric [Motor Strength] : muscle strength was normal in all four extremities [No Muscle Atrophy] : normal bulk in all four extremities [Abnormal Walk] : normal gait [Balance] : balance was intact [1+] : Ankle jerk right 1+ [Past-pointing] : there was no past-pointing [Tremor] : no tremor present [Plantar Reflex Right Only] : normal on the right [Plantar Reflex Left Only] : normal on the left [FreeTextEntry5] : decreased   hearing   bilaterally [FreeTextEntry7] : decreased  to  all    modalities   peripherally  -  all    limbs

## 2020-01-16 NOTE — DISCUSSION/SUMMARY
[FreeTextEntry1] : Cognitive     issues \par -  r/o    vascular   dementia,  I    reviewed    the    available    MRI   results\par -  r/o   sz, EEG    was    reviewed\par -  r/o   chemo  induced   cognitive    issues\par \par Pt    will    be   referred    to    Neuropsychological   evaluation  and   amb  EEG\par Pt    is  not     driving

## 2020-02-19 ENCOUNTER — APPOINTMENT (OUTPATIENT)
Dept: NEUROLOGY | Facility: CLINIC | Age: 85
End: 2020-02-19
Payer: MEDICARE

## 2020-02-20 ENCOUNTER — APPOINTMENT (OUTPATIENT)
Dept: NEUROLOGY | Facility: CLINIC | Age: 85
End: 2020-02-20

## 2020-02-20 PROCEDURE — 95719 EEG PHYS/QHP EA INCR W/O VID: CPT

## 2020-02-20 PROCEDURE — 95708 EEG WO VID EA 12-26HR UNMNTR: CPT

## 2020-02-20 PROCEDURE — 95700 EEG CONT REC W/VID EEG TECH: CPT

## 2020-02-25 ENCOUNTER — APPOINTMENT (OUTPATIENT)
Dept: NEUROLOGY | Facility: CLINIC | Age: 85
End: 2020-02-25
Payer: MEDICARE

## 2020-02-25 VITALS
TEMPERATURE: 97.7 F | WEIGHT: 175 LBS | OXYGEN SATURATION: 98 % | HEART RATE: 66 BPM | BODY MASS INDEX: 23.7 KG/M2 | SYSTOLIC BLOOD PRESSURE: 108 MMHG | DIASTOLIC BLOOD PRESSURE: 68 MMHG | HEIGHT: 72 IN | RESPIRATION RATE: 12 BRPM

## 2020-02-25 PROCEDURE — 99215 OFFICE O/P EST HI 40 MIN: CPT

## 2020-02-25 NOTE — DISCUSSION/SUMMARY
[FreeTextEntry1] : Memory    issues.  The   ambulatory    EEG    is    abn., pt    can  not    tolerate   an  OP   ambulatory    EEG. \par Will  arrange   the   IP  EEG.\par Pt   will    need   Neuropsychological    evaluation.

## 2020-02-25 NOTE — PHYSICAL EXAM
[General Appearance - In No Acute Distress] : in no acute distress [General Appearance - Alert] : alert [Oriented To Time, Place, And Person] : oriented to person, place, and time [Impaired Insight] : insight and judgment were intact [Affect] : the affect was normal [Person] : oriented to person [Place] : oriented to place [Time] : oriented to time [Concentration Intact] : normal concentrating ability [Visual Intact] : visual attention was ~T not ~L decreased [Naming Objects] : no difficulty naming common objects [Writing A Sentence] : no difficulty writing a sentence [Repeating Phrases] : no difficulty repeating a phrase [Fluency] : fluency intact [Comprehension] : comprehension intact [Reading] : reading intact [Past History] : adequate knowledge of personal past history [Cranial Nerves Optic (II)] : visual acuity intact bilaterally,  visual fields full to confrontation, pupils equal round and reactive to light [Cranial Nerves Oculomotor (III)] : extraocular motion intact [Cranial Nerves Trigeminal (V)] : facial sensation intact symmetrically [Cranial Nerves Facial (VII)] : face symmetrical [Cranial Nerves Vestibulocochlear (VIII)] : hearing was intact bilaterally [Cranial Nerves Glossopharyngeal (IX)] : tongue and palate midline [Cranial Nerves Accessory (XI - Cranial And Spinal)] : head turning and shoulder shrug symmetric [Cranial Nerves Hypoglossal (XII)] : there was no tongue deviation with protrusion [Motor Tone] : muscle tone was normal in all four extremities [Motor Strength] : muscle strength was normal in all four extremities [No Muscle Atrophy] : normal bulk in all four extremities [Paresis Pronator Drift Left-Sided] : a left-sided pronator drift was present [Balance] : balance was intact [Abnormal Walk] : normal gait [Past-pointing] : there was no past-pointing [Tremor] : no tremor present [Dysdiadochokinesia On The Left] : present on the left side [Coordination - Dysmetria Impaired Finger-to-Nose Left Only] : present on the left side [1+] : Ankle jerk left 1+ [Plantar Reflex Right Only] : normal on the right [Plantar Reflex Left Only] : normal on the left [FreeTextEntry5] : decreased   hearing   bilaterally [FreeTextEntry7] : decreased  to  all    modalities   peripherally  -  all    limbs

## 2020-02-25 NOTE — HISTORY OF PRESENT ILLNESS
[FreeTextEntry1] : Pt  presented   to     follow  up test results   and   further management. \par \par From the   previous  visit. \par This is an 85 y.o. male who presented accompanied by his wife. Major concern today is memory issues. Memory issues were first noticed in September 2016 a few days after the surgery (Colon CA). Reportedly the Pt had a few TIAs in the past.\par \par MRI Brain - 11/22/2019 Age related involutional changed. White matter findings which are non specific. Partial differential may include foci of demyelination, ischemia and gliosis. Evidence for hemosiderin at te Right temporal and right occipital temporo-occipital lobe regions. \par \par EEG on 11/14/2019 - c/w potentially epileptogenic focal area of cerebral dysfunction on the Right temporal region and focal area of cerebral dysfunction on the Left temporal region. \par \par Of note, MRI of the brain in April 2018 was normal for age. \par \par Pt underwent chemo (ox platinum)\par Pt is known to have a chemo induced polyneuropathy. \par \par EEG   results are   not   available.   Neuropsychological   test  results  are  not  available. Pt    states     that it    was    extremely  difficult    for   him    to  manage   the    ambulatory    EEG   equipment    at  home.

## 2020-03-06 ENCOUNTER — APPOINTMENT (OUTPATIENT)
Dept: INTERNAL MEDICINE | Facility: CLINIC | Age: 85
End: 2020-03-06
Payer: MEDICARE

## 2020-03-06 VITALS
HEART RATE: 74 BPM | TEMPERATURE: 97.8 F | RESPIRATION RATE: 12 BRPM | OXYGEN SATURATION: 100 % | BODY MASS INDEX: 23.7 KG/M2 | HEIGHT: 72 IN | WEIGHT: 175 LBS | DIASTOLIC BLOOD PRESSURE: 68 MMHG | SYSTOLIC BLOOD PRESSURE: 100 MMHG

## 2020-03-06 DIAGNOSIS — R53.83 OTHER MALAISE: ICD-10-CM

## 2020-03-06 DIAGNOSIS — R53.81 OTHER MALAISE: ICD-10-CM

## 2020-03-06 PROCEDURE — 36415 COLL VENOUS BLD VENIPUNCTURE: CPT

## 2020-03-06 PROCEDURE — 99214 OFFICE O/P EST MOD 30 MIN: CPT

## 2020-03-06 NOTE — HISTORY OF PRESENT ILLNESS
[Spouse] : spouse [de-identified] : 84 yo M with hx of colon CA s/p resection, Memory loss, \par Here with c/o fatigue this week - here with wife- in bed most of the day., eating a little less. \par speech and memory remains poor. no sob no cp\par Taking lots of vitamins

## 2020-03-06 NOTE — PLAN
[FreeTextEntry1] : \par \par Fatigue - plan to check labs today\par - encourage more physical activity on a daily basis\par - encourage using the staris 1=2 times daily\par \par Memory loss- encourage follow up for neuropsych evaluation and f/up with Dr Mccurdy\par \par Anemia hx- check cbc

## 2020-03-06 NOTE — PHYSICAL EXAM
[Normal] : normal rate, regular rhythm, normal S1 and S2 and no murmur heard [de-identified] : trace edema present bl le [de-identified] : slow gait [de-identified] : decreased insight

## 2020-03-08 LAB
ALBUMIN SERPL ELPH-MCNC: 4.8 G/DL
ALP BLD-CCNC: 57 U/L
ALT SERPL-CCNC: 16 U/L
ANION GAP SERPL CALC-SCNC: 15 MMOL/L
APPEARANCE: CLEAR
AST SERPL-CCNC: 21 U/L
BASOPHILS # BLD AUTO: 0.06 K/UL
BASOPHILS NFR BLD AUTO: 0.8 %
BILIRUB SERPL-MCNC: 0.4 MG/DL
BILIRUBIN URINE: NEGATIVE
BLOOD URINE: NEGATIVE
BUN SERPL-MCNC: 29 MG/DL
CALCIUM SERPL-MCNC: 10 MG/DL
CHLORIDE SERPL-SCNC: 101 MMOL/L
CO2 SERPL-SCNC: 24 MMOL/L
COLOR: YELLOW
CREAT SERPL-MCNC: 1.33 MG/DL
EOSINOPHIL # BLD AUTO: 0.18 K/UL
EOSINOPHIL NFR BLD AUTO: 2.3 %
GLUCOSE QUALITATIVE U: NEGATIVE
GLUCOSE SERPL-MCNC: 85 MG/DL
HCT VFR BLD CALC: 42.6 %
HGB BLD-MCNC: 13.2 G/DL
IMM GRANULOCYTES NFR BLD AUTO: 0.4 %
KETONES URINE: NEGATIVE
LEUKOCYTE ESTERASE URINE: NEGATIVE
LYMPHOCYTES # BLD AUTO: 2.06 K/UL
LYMPHOCYTES NFR BLD AUTO: 26.7 %
MAN DIFF?: NORMAL
MCHC RBC-ENTMCNC: 31 GM/DL
MCHC RBC-ENTMCNC: 31.4 PG
MCV RBC AUTO: 101.4 FL
MONOCYTES # BLD AUTO: 0.73 K/UL
MONOCYTES NFR BLD AUTO: 9.5 %
NEUTROPHILS # BLD AUTO: 4.65 K/UL
NEUTROPHILS NFR BLD AUTO: 60.3 %
NITRITE URINE: NEGATIVE
PH URINE: 6
PLATELET # BLD AUTO: 296 K/UL
POTASSIUM SERPL-SCNC: 4.8 MMOL/L
PROT SERPL-MCNC: 7.6 G/DL
PROTEIN URINE: NEGATIVE
RBC # BLD: 4.2 M/UL
RBC # FLD: 14.2 %
SODIUM SERPL-SCNC: 140 MMOL/L
SPECIFIC GRAVITY URINE: 1.02
TSH SERPL-ACNC: 3.87 UIU/ML
UROBILINOGEN URINE: NORMAL
VIT B12 SERPL-MCNC: 934 PG/ML
WBC # FLD AUTO: 7.71 K/UL

## 2020-06-19 ENCOUNTER — APPOINTMENT (OUTPATIENT)
Dept: UROLOGY | Facility: CLINIC | Age: 85
End: 2020-06-19
Payer: MEDICARE

## 2020-06-19 VITALS — TEMPERATURE: 97.6 F | DIASTOLIC BLOOD PRESSURE: 61 MMHG | HEART RATE: 67 BPM | SYSTOLIC BLOOD PRESSURE: 97 MMHG

## 2020-06-19 DIAGNOSIS — N30.90 CYSTITIS, UNSPECIFIED W/OUT HEMATURIA: ICD-10-CM

## 2020-06-19 PROCEDURE — 99213 OFFICE O/P EST LOW 20 MIN: CPT

## 2020-06-19 NOTE — PHYSICAL EXAM
[Urethral Meatus] : meatus normal [Scrotum] : the scrotum was normal [Urinary Bladder Findings] : the bladder was normal on palpation [Penis Abnormality] : normal uncircumcised penis [Testes Mass (___cm)] : there were no testicular masses [Testes Tenderness] : no tenderness of the testes [Epididymis] : the epididymides were normal

## 2020-06-19 NOTE — ASSESSMENT
[FreeTextEntry1] : new onset urgency\par PVR 22\par UA UCX today\par likely cystitis\par empiric keflex \par f/u 1 month

## 2020-06-19 NOTE — HISTORY OF PRESENT ILLNESS
[FreeTextEntry1] : usually with nocturia x 2\par now with nocturia x 5\par +urgency and freqncy\par no hematuria\par no dysuria\par no pain in testicle\par no penile pain

## 2020-06-22 LAB
APPEARANCE: CLEAR
BACTERIA UR CULT: NORMAL
BACTERIA: NEGATIVE
BILIRUBIN URINE: NEGATIVE
BLOOD URINE: NORMAL
COLOR: YELLOW
GLUCOSE QUALITATIVE U: NEGATIVE
HYALINE CASTS: 3 /LPF
KETONES URINE: NEGATIVE
LEUKOCYTE ESTERASE URINE: NEGATIVE
MICROSCOPIC-UA: NORMAL
NITRITE URINE: NEGATIVE
PH URINE: 6
PROTEIN URINE: NORMAL
RED BLOOD CELLS URINE: 3 /HPF
SPECIFIC GRAVITY URINE: 1.02
SQUAMOUS EPITHELIAL CELLS: 1 /HPF
UROBILINOGEN URINE: NORMAL
WHITE BLOOD CELLS URINE: 1 /HPF

## 2020-06-25 ENCOUNTER — TRANSCRIPTION ENCOUNTER (OUTPATIENT)
Age: 85
End: 2020-06-25

## 2020-09-08 ENCOUNTER — TRANSCRIPTION ENCOUNTER (OUTPATIENT)
Age: 85
End: 2020-09-08

## 2020-09-09 ENCOUNTER — APPOINTMENT (OUTPATIENT)
Dept: NEUROLOGY | Facility: CLINIC | Age: 85
End: 2020-09-09
Payer: MEDICARE

## 2020-09-09 VITALS
TEMPERATURE: 98.1 F | BODY MASS INDEX: 23.3 KG/M2 | WEIGHT: 172 LBS | HEART RATE: 62 BPM | OXYGEN SATURATION: 97 % | DIASTOLIC BLOOD PRESSURE: 67 MMHG | SYSTOLIC BLOOD PRESSURE: 128 MMHG | HEIGHT: 72 IN

## 2020-09-09 DIAGNOSIS — R26.89 OTHER ABNORMALITIES OF GAIT AND MOBILITY: ICD-10-CM

## 2020-09-09 PROCEDURE — 99215 OFFICE O/P EST HI 40 MIN: CPT

## 2020-09-09 NOTE — PHYSICAL EXAM
[General Appearance - Alert] : alert [General Appearance - In No Acute Distress] : in no acute distress [Oriented To Time, Place, And Person] : oriented to person, place, and time [Impaired Insight] : insight and judgment were intact [Affect] : the affect was normal [Place] : oriented to place [Person] : oriented to person [Time] : oriented to time [Visual Intact] : visual attention was ~T not ~L decreased [Concentration Intact] : normal concentrating ability [Repeating Phrases] : no difficulty repeating a phrase [Naming Objects] : no difficulty naming common objects [Writing A Sentence] : no difficulty writing a sentence [Fluency] : fluency intact [Comprehension] : comprehension intact [Reading] : reading intact [Past History] : adequate knowledge of personal past history [Cranial Nerves Optic (II)] : visual acuity intact bilaterally,  visual fields full to confrontation, pupils equal round and reactive to light [Cranial Nerves Oculomotor (III)] : extraocular motion intact [Cranial Nerves Trigeminal (V)] : facial sensation intact symmetrically [Cranial Nerves Vestibulocochlear (VIII)] : hearing was intact bilaterally [Cranial Nerves Facial (VII)] : face symmetrical [Cranial Nerves Glossopharyngeal (IX)] : tongue and palate midline [Cranial Nerves Accessory (XI - Cranial And Spinal)] : head turning and shoulder shrug symmetric [Cranial Nerves Hypoglossal (XII)] : there was no tongue deviation with protrusion [Motor Tone] : muscle tone was normal in all four extremities [Motor Strength] : muscle strength was normal in all four extremities [No Muscle Atrophy] : normal bulk in all four extremities [Balance] : balance was intact [Abnormal Walk] : normal gait [Paresis Pronator Drift Left-Sided] : a left-sided pronator drift was present [Dysdiadochokinesia On The Left] : present on the left side [Coordination - Dysmetria Impaired Finger-to-Nose Left Only] : present on the left side [1+] : Ankle jerk right 1+ [Past-pointing] : there was no past-pointing [Tremor] : no tremor present [Plantar Reflex Right Only] : normal on the right [Plantar Reflex Left Only] : normal on the left [FreeTextEntry5] : decreased   hearing   bilaterally [FreeTextEntry7] : decreased  to  all    modalities   peripherally  -  all    limbs

## 2020-09-09 NOTE — DISCUSSION/SUMMARY
[FreeTextEntry1] : Memory    issues\par I    can  not    r/o   the  residual   effect    of    chemotherapy\par FT   dementia/vs   AD    can  not    be    r/out. Pt   will    be  referred    to [facility]\par Would     recommend    a    follow    up   Neuropsychological  eval  in  1    year.   Meanwhile  will  start  Namenda 10   mg   PO   qD\par Pt    will  benefit   from   PT

## 2020-09-10 ENCOUNTER — TRANSCRIPTION ENCOUNTER (OUTPATIENT)
Age: 85
End: 2020-09-10

## 2020-11-18 ENCOUNTER — APPOINTMENT (OUTPATIENT)
Dept: NEUROLOGY | Facility: CLINIC | Age: 85
End: 2020-11-18
Payer: MEDICARE

## 2020-11-18 VITALS
HEART RATE: 66 BPM | OXYGEN SATURATION: 97 % | TEMPERATURE: 97.3 F | HEIGHT: 72 IN | SYSTOLIC BLOOD PRESSURE: 125 MMHG | DIASTOLIC BLOOD PRESSURE: 73 MMHG

## 2020-11-18 PROCEDURE — 99214 OFFICE O/P EST MOD 30 MIN: CPT

## 2020-11-18 NOTE — ASSESSMENT
[FreeTextEntry1] : Dementia.  Suspect most likely Alzheimer's, though paranoia/delusions raise some concern for FTD as well.\par \par Continue Namenda 10 mg daily.\par Asked wife to obtain a copy of prior MRI images for me to review.\par No need for previously ordered ADRIANA scan.\par \par RTC 6 months

## 2020-11-18 NOTE — HISTORY OF PRESENT ILLNESS
[FreeTextEntry1] : Previously followed by Dr. Mccurdy.\par \par To briefly summarize prior history: Had a resection for colon cancer in 2016 and memory was noted to be significantly worse postop -- initially thought to be due to anesthesia, but never completed recovered.  Cognitive function worsened again after a viral illness in 2019.  Work up has included brain MRI (non-specific white matter changes, hemosiderin deposition in right temporal and right temporo-occipital regions), EEG (rare right temporal sharp waves and bitemporal slowing reported on routine -- unable to view independently as file is no longer available; mild generalized background slowing on ambulatory but limited by artifact).\par \par Since last visit memory has been stably poor.  Has been taking Namenda 10 mg daily without side effects.  Occasional paranoia -- had an episode where he thought that his wife was controlling his dreams.  Also had a delusional episode where he thought he had left a light on in a meeting room across the street.

## 2020-11-18 NOTE — PHYSICAL EXAM
[FreeTextEntry1] : Physical Exam\par Constitutional: no apparent distress\par Psychiatric: normal affect, euthymic, alert and oriented x 3\par \par Neurologic Exam:\par Mental Status: awake and alert, speech fluent and prosodic with no paraphasic errors, remembers 0/3 at 5 minutes, unable to copy Luria sequence\par Cranial Nerves: pupils equal, extraocular muscle movements intact with no nystagmus, face symmetric, no dysarthria\par Motor: normal bulk and tone, no orbiting or pronator drift, no bradykinesia, no abnormal movements\par Coordination: finger-nose-finger intact bilaterally\par Gait: narrow base, normal stance and stride

## 2020-12-01 ENCOUNTER — TRANSCRIPTION ENCOUNTER (OUTPATIENT)
Age: 85
End: 2020-12-01

## 2020-12-01 DIAGNOSIS — M77.8 OTHER ENTHESOPATHIES, NOT ELSEWHERE CLASSIFIED: ICD-10-CM

## 2020-12-01 DIAGNOSIS — M79.18 MYALGIA, OTHER SITE: ICD-10-CM

## 2020-12-01 DIAGNOSIS — Z87.39 PERSONAL HISTORY OF OTHER DISEASES OF THE MUSCULOSKELETAL SYSTEM AND CONNECTIVE TISSUE: ICD-10-CM

## 2020-12-01 DIAGNOSIS — M25.511 PAIN IN RIGHT SHOULDER: ICD-10-CM

## 2020-12-01 DIAGNOSIS — N39.41 URGE INCONTINENCE: ICD-10-CM

## 2020-12-01 DIAGNOSIS — M25.559 PAIN IN UNSPECIFIED HIP: ICD-10-CM

## 2020-12-01 DIAGNOSIS — E78.49 OTHER HYPERLIPIDEMIA: ICD-10-CM

## 2020-12-01 DIAGNOSIS — R60.0 LOCALIZED EDEMA: ICD-10-CM

## 2020-12-01 DIAGNOSIS — Z85.038 PERSONAL HISTORY OF OTHER MALIGNANT NEOPLASM OF LARGE INTESTINE: ICD-10-CM

## 2020-12-01 DIAGNOSIS — Z87.898 PERSONAL HISTORY OF OTHER SPECIFIED CONDITIONS: ICD-10-CM

## 2020-12-01 DIAGNOSIS — Z87.19 PERSONAL HISTORY OF OTHER DISEASES OF THE DIGESTIVE SYSTEM: ICD-10-CM

## 2020-12-07 ENCOUNTER — TRANSCRIPTION ENCOUNTER (OUTPATIENT)
Age: 85
End: 2020-12-07

## 2020-12-09 ENCOUNTER — TRANSCRIPTION ENCOUNTER (OUTPATIENT)
Age: 85
End: 2020-12-09

## 2020-12-22 ENCOUNTER — APPOINTMENT (OUTPATIENT)
Dept: INTERNAL MEDICINE | Facility: CLINIC | Age: 85
End: 2020-12-22
Payer: MEDICARE

## 2020-12-22 VITALS
TEMPERATURE: 98.2 F | HEIGHT: 72 IN | WEIGHT: 178 LBS | OXYGEN SATURATION: 97 % | DIASTOLIC BLOOD PRESSURE: 64 MMHG | SYSTOLIC BLOOD PRESSURE: 108 MMHG | BODY MASS INDEX: 24.11 KG/M2 | RESPIRATION RATE: 12 BRPM | HEART RATE: 86 BPM

## 2020-12-22 DIAGNOSIS — Z00.00 ENCOUNTER FOR GENERAL ADULT MEDICAL EXAMINATION W/OUT ABNORMAL FINDINGS: ICD-10-CM

## 2020-12-22 PROCEDURE — 99072 ADDL SUPL MATRL&STAF TM PHE: CPT

## 2020-12-22 PROCEDURE — 36415 COLL VENOUS BLD VENIPUNCTURE: CPT

## 2020-12-22 PROCEDURE — 93000 ELECTROCARDIOGRAM COMPLETE: CPT

## 2020-12-22 PROCEDURE — 99397 PER PM REEVAL EST PAT 65+ YR: CPT

## 2020-12-22 NOTE — REVIEW OF SYSTEMS
[Joint Pain] : joint pain [Back Pain] : back pain [Negative] : Gastrointestinal [de-identified] : dry skin [de-identified] : memory issues.

## 2020-12-22 NOTE — PLAN
[FreeTextEntry1] : Wellness complete\par labs today\par EKG  -Sinus [place]\par  continue to hold memantine\par  follow up with Dr Hair\par Already had flu vaccine\par encourage a healthier diet, regular walking\par f/up 6 months

## 2020-12-22 NOTE — PHYSICAL EXAM
[Normal] : no posterior cervical lymphadenopathy and no anterior cervical lymphadenopathy [de-identified] : trace edema present bl le [de-identified] : dry skin [de-identified] : slow gait [de-identified] : decreased insight

## 2020-12-24 LAB
25(OH)D3 SERPL-MCNC: 57.1 NG/ML
ALBUMIN SERPL ELPH-MCNC: 4 G/DL
ALP BLD-CCNC: 63 U/L
ALT SERPL-CCNC: 11 U/L
ANION GAP SERPL CALC-SCNC: 9 MMOL/L
APPEARANCE: CLEAR
AST SERPL-CCNC: 18 U/L
BASOPHILS # BLD AUTO: 0.1 K/UL
BASOPHILS NFR BLD AUTO: 1.4 %
BILIRUB SERPL-MCNC: 0.2 MG/DL
BILIRUBIN URINE: NEGATIVE
BLOOD URINE: NEGATIVE
BUN SERPL-MCNC: 28 MG/DL
CALCIUM SERPL-MCNC: 9.1 MG/DL
CHLORIDE SERPL-SCNC: 104 MMOL/L
CHOLEST SERPL-MCNC: 171 MG/DL
CO2 SERPL-SCNC: 26 MMOL/L
COLOR: YELLOW
CREAT SERPL-MCNC: 1.59 MG/DL
EOSINOPHIL # BLD AUTO: 0.25 K/UL
EOSINOPHIL NFR BLD AUTO: 3.4 %
ESTIMATED AVERAGE GLUCOSE: 114 MG/DL
GLUCOSE QUALITATIVE U: NEGATIVE
GLUCOSE SERPL-MCNC: 83 MG/DL
HBA1C MFR BLD HPLC: 5.6 %
HCT VFR BLD CALC: 38.6 %
HDLC SERPL-MCNC: 67 MG/DL
HGB BLD-MCNC: 12.4 G/DL
IMM GRANULOCYTES NFR BLD AUTO: 0.8 %
KETONES URINE: NEGATIVE
LDLC SERPL CALC-MCNC: 89 MG/DL
LEUKOCYTE ESTERASE URINE: NEGATIVE
LYMPHOCYTES # BLD AUTO: 1.74 K/UL
LYMPHOCYTES NFR BLD AUTO: 24 %
MAN DIFF?: NORMAL
MCHC RBC-ENTMCNC: 32.1 GM/DL
MCHC RBC-ENTMCNC: 32.3 PG
MCV RBC AUTO: 100.5 FL
MONOCYTES # BLD AUTO: 0.81 K/UL
MONOCYTES NFR BLD AUTO: 11.2 %
NEUTROPHILS # BLD AUTO: 4.3 K/UL
NEUTROPHILS NFR BLD AUTO: 59.2 %
NITRITE URINE: NEGATIVE
NONHDLC SERPL-MCNC: 104 MG/DL
PH URINE: 6
PLATELET # BLD AUTO: 305 K/UL
POTASSIUM SERPL-SCNC: 4.8 MMOL/L
PROT SERPL-MCNC: 7.1 G/DL
PROTEIN URINE: NORMAL
RBC # BLD: 3.84 M/UL
RBC # FLD: 14.1 %
SODIUM SERPL-SCNC: 139 MMOL/L
SPECIFIC GRAVITY URINE: 1.03
TRIGL SERPL-MCNC: 75 MG/DL
TSH SERPL-ACNC: 2.72 UIU/ML
UROBILINOGEN URINE: NORMAL
VIT B12 SERPL-MCNC: 703 PG/ML
WBC # FLD AUTO: 7.26 K/UL

## 2020-12-29 ENCOUNTER — TRANSCRIPTION ENCOUNTER (OUTPATIENT)
Age: 85
End: 2020-12-29

## 2020-12-30 ENCOUNTER — TRANSCRIPTION ENCOUNTER (OUTPATIENT)
Age: 85
End: 2020-12-30

## 2021-01-04 ENCOUNTER — TRANSCRIPTION ENCOUNTER (OUTPATIENT)
Age: 86
End: 2021-01-04

## 2021-01-08 ENCOUNTER — TRANSCRIPTION ENCOUNTER (OUTPATIENT)
Age: 86
End: 2021-01-08

## 2021-01-14 ENCOUNTER — APPOINTMENT (OUTPATIENT)
Dept: UROLOGY | Facility: CLINIC | Age: 86
End: 2021-01-14

## 2021-02-01 ENCOUNTER — TRANSCRIPTION ENCOUNTER (OUTPATIENT)
Age: 86
End: 2021-02-01

## 2021-02-01 LAB
ANION GAP SERPL CALC-SCNC: 11 MMOL/L
BUN SERPL-MCNC: 30 MG/DL
CALCIUM SERPL-MCNC: 9.6 MG/DL
CHLORIDE SERPL-SCNC: 106 MMOL/L
CO2 SERPL-SCNC: 26 MMOL/L
CREAT SERPL-MCNC: 1.69 MG/DL
GLUCOSE SERPL-MCNC: 107 MG/DL
POTASSIUM SERPL-SCNC: 5.4 MMOL/L
SODIUM SERPL-SCNC: 142 MMOL/L

## 2021-02-02 ENCOUNTER — TRANSCRIPTION ENCOUNTER (OUTPATIENT)
Age: 86
End: 2021-02-02

## 2021-02-03 ENCOUNTER — TRANSCRIPTION ENCOUNTER (OUTPATIENT)
Age: 86
End: 2021-02-03

## 2021-02-08 ENCOUNTER — APPOINTMENT (OUTPATIENT)
Dept: INTERNAL MEDICINE | Facility: CLINIC | Age: 86
End: 2021-02-08

## 2021-02-08 ENCOUNTER — APPOINTMENT (OUTPATIENT)
Dept: NEUROLOGY | Facility: CLINIC | Age: 86
End: 2021-02-08
Payer: MEDICARE

## 2021-02-08 VITALS
HEIGHT: 72 IN | SYSTOLIC BLOOD PRESSURE: 91 MMHG | HEART RATE: 71 BPM | TEMPERATURE: 98.1 F | OXYGEN SATURATION: 96 % | DIASTOLIC BLOOD PRESSURE: 60 MMHG

## 2021-02-08 PROCEDURE — 99214 OFFICE O/P EST MOD 30 MIN: CPT

## 2021-02-08 PROCEDURE — 99072 ADDL SUPL MATRL&STAF TM PHE: CPT

## 2021-02-08 RX ORDER — MEMANTINE HYDROCHLORIDE 10 MG/1
10 TABLET, FILM COATED ORAL
Qty: 30 | Refills: 11 | Status: DISCONTINUED | COMMUNITY
Start: 2020-09-09 | End: 2021-02-08

## 2021-02-09 NOTE — PHYSICAL EXAM
[FreeTextEntry1] : General: no apparent distress\par Mental Status: awake and alert, speech fluent and prosodic with no paraphasic errors\par Cranial Nerves: tracks in all directions, face symmetric, no dysarthria\par Motor: no abnormal movements, no orbiting or pronator drift\par Coordination: no dysmetria on finger-nose-finger testing\par Gait: narrow base, normal stance and stride, no Romberg\par

## 2021-02-09 NOTE — ASSESSMENT
[FreeTextEntry1] : 1) Dementia -- slowly progressive, patient and wife prefer to continue off medications at this time due to side effects\par \par 2) Headaches -- description consistent with tension headaches, importance of hydration reviewed, no additional work up or treatment at this time\par \par 3) Possible seizures -- based on his current clinical presentation my suspicion for seizures is low.  I think that the concern for seizures initially was most likely driven by the focal hemosiderin deposition on his MRI.  Discussed with patient and wife that we can re-attempt ambulatory EEG if they feel very strongly about definitively excluding underlying epileptiform activity, but given my low suspicion and their desire to not start any medications unless absolutely necessary, I think this would be unlikely to .  We agreed to defer EEG at this time.\par \par RTC 6 months\par \par

## 2021-02-09 NOTE — HISTORY OF PRESENT ILLNESS
[FreeTextEntry1] : 1) Dementia -- Patient feels that he is stable since last visit.  Wife feels that he is getting worse, having trouble using appliances at home (humidifier, coffee machine) that he was able to use on his own recently, not knowing where the bedroom is in their apartment, getting lost while walking their dog around the block.  Stopped Namenda and lower extremity skin changes resolved -- not currently taking any medications.\par \par 2) Headaches -- Has developed new headaches that occur a few times per week, sharp pain in the middle of his forehead that goes away when he drinks a bottle of flavored water.  No migrainous features or associated neurologic symptoms.\par \par 3) Possible seizures -- Patient's wife remains concerned about seizures as she was previously told by Dr. Mccurdy that his fluctuating mental status was likely due to seizures.  He is not currently having acute fluctuations/changes in mental status (remains stable with slow overall decline) and has not had any other seizure-like activity.

## 2021-02-09 NOTE — DATA REVIEWED
[de-identified] : MRI brain 11/22/2019 -- GRE signal in right temporal and right temporo-occipital regions c/f hemosiderin deposition, age-related atrophy and white matter changes [de-identified] : Ambulatory EEG February 2020 -- technically limited study due to disconnected leads, mild generalized background slowing [de-identified] : Notes from Dr. Mccurdy and Dr. Hull reviewed

## 2021-02-19 ENCOUNTER — TRANSCRIPTION ENCOUNTER (OUTPATIENT)
Age: 86
End: 2021-02-19

## 2021-02-22 ENCOUNTER — TRANSCRIPTION ENCOUNTER (OUTPATIENT)
Age: 86
End: 2021-02-22

## 2021-02-24 ENCOUNTER — TRANSCRIPTION ENCOUNTER (OUTPATIENT)
Age: 86
End: 2021-02-24

## 2021-02-25 ENCOUNTER — TRANSCRIPTION ENCOUNTER (OUTPATIENT)
Age: 86
End: 2021-02-25

## 2021-02-26 LAB
ALBUMIN SERPL ELPH-MCNC: 4.1 G/DL
ALP BLD-CCNC: 51 U/L
ALT SERPL-CCNC: 15 U/L
ANION GAP SERPL CALC-SCNC: 11 MMOL/L
APPEARANCE: CLEAR
AST SERPL-CCNC: 15 U/L
BASOPHILS # BLD AUTO: 0.09 K/UL
BASOPHILS NFR BLD AUTO: 1.1 %
BILIRUB SERPL-MCNC: 0.3 MG/DL
BILIRUBIN URINE: NEGATIVE
BLOOD URINE: NEGATIVE
BUN SERPL-MCNC: 30 MG/DL
CALCIUM SERPL-MCNC: 9 MG/DL
CHLORIDE SERPL-SCNC: 102 MMOL/L
CO2 SERPL-SCNC: 25 MMOL/L
COLOR: YELLOW
CREAT SERPL-MCNC: 1.5 MG/DL
EOSINOPHIL # BLD AUTO: 0.28 K/UL
EOSINOPHIL NFR BLD AUTO: 3.5 %
FOLATE SERPL-MCNC: >20 NG/ML
GLUCOSE QUALITATIVE U: NEGATIVE
GLUCOSE SERPL-MCNC: 86 MG/DL
HCT VFR BLD CALC: 37.3 %
HGB BLD-MCNC: 11.8 G/DL
IMM GRANULOCYTES NFR BLD AUTO: 0.4 %
KETONES URINE: NEGATIVE
LEUKOCYTE ESTERASE URINE: NEGATIVE
LYMPHOCYTES # BLD AUTO: 2.17 K/UL
LYMPHOCYTES NFR BLD AUTO: 27.3 %
MAN DIFF?: NORMAL
MCHC RBC-ENTMCNC: 31.6 GM/DL
MCHC RBC-ENTMCNC: 31.6 PG
MCV RBC AUTO: 100 FL
MONOCYTES # BLD AUTO: 0.94 K/UL
MONOCYTES NFR BLD AUTO: 11.8 %
NEUTROPHILS # BLD AUTO: 4.44 K/UL
NEUTROPHILS NFR BLD AUTO: 55.9 %
NITRITE URINE: NEGATIVE
PH URINE: 6
PLATELET # BLD AUTO: 297 K/UL
POTASSIUM SERPL-SCNC: 4.7 MMOL/L
PROT SERPL-MCNC: 6.9 G/DL
PROTEIN URINE: NORMAL
RBC # BLD: 3.73 M/UL
RBC # FLD: 14.4 %
SODIUM SERPL-SCNC: 138 MMOL/L
SPECIFIC GRAVITY URINE: 1.02
UROBILINOGEN URINE: NORMAL
VIT B12 SERPL-MCNC: 719 PG/ML
WBC # FLD AUTO: 7.95 K/UL

## 2021-03-11 ENCOUNTER — TRANSCRIPTION ENCOUNTER (OUTPATIENT)
Age: 86
End: 2021-03-11

## 2021-04-02 ENCOUNTER — APPOINTMENT (OUTPATIENT)
Dept: UROLOGY | Facility: CLINIC | Age: 86
End: 2021-04-02

## 2021-04-15 ENCOUNTER — APPOINTMENT (OUTPATIENT)
Dept: UROLOGY | Facility: CLINIC | Age: 86
End: 2021-04-15
Payer: MEDICARE

## 2021-04-15 VITALS — TEMPERATURE: 97.8 F | HEART RATE: 84 BPM | SYSTOLIC BLOOD PRESSURE: 99 MMHG | DIASTOLIC BLOOD PRESSURE: 63 MMHG

## 2021-04-15 DIAGNOSIS — R35.0 FREQUENCY OF MICTURITION: ICD-10-CM

## 2021-04-15 PROCEDURE — 99213 OFFICE O/P EST LOW 20 MIN: CPT

## 2021-04-15 PROCEDURE — 99072 ADDL SUPL MATRL&STAF TM PHE: CPT

## 2021-04-15 NOTE — ASSESSMENT
[FreeTextEntry1] : BPH\par 'Urinary Frequency \par +urgency, +incontinence\par Discussed with patient and spouse medications,  indications and s/e,\par Patient and spouse deferred treatment of symptoms with medication. \par patient is taking OTC  Saw [place] - reports to help\par \par UA UCx today - will discuss results by phone\par follow up as needed

## 2021-04-15 NOTE — HISTORY OF PRESENT ILLNESS
[Urinary Incontinence] : urinary incontinence [Urinary Urgency] : urinary urgency [Urinary Frequency] : urinary frequency [Nocturia] : nocturia [Post-Void Dribbling] : post-void dribbling [Erectile Dysfunction] : Erectile Dysfunction [None] : None [FreeTextEntry1] : This is an 87M with Hx of colon Ca and  Alzheimer's, cystitis (2020)  here for followup.\par Patient reports years of  +urgency and frequency q3hr, nocturia 3-5X, incontinence\par History of UTI s/p sepsis 2014\par Denies dysuria, burning, gross hematuria, or prostatitis. \par takes OTC Saw palmetto. - effective.  [Urinary Retention] : no urinary retention [Straining] : no straining [Weak Stream] : no weak stream [Intermittency] : no intermittency [Dysuria] : no dysuria [Hematuria - Gross] : no gross hematuria [Hematuria - Microscopic] : no microscopic hematuria [Bladder Spasm] : no bladder spasm [Abdominal Pain] : no abdominal pain [Flank Pain] : no flank pain [Fever] : no fever [Fatigue] : no fatigue [Nausea] : no nausea [Anorexia] : no anorexia

## 2021-04-15 NOTE — PHYSICAL EXAM
[General Appearance - Well Developed] : well developed [General Appearance - Well Nourished] : well nourished [Normal Appearance] : normal appearance [Well Groomed] : well groomed [General Appearance - In No Acute Distress] : no acute distress [Abdomen Soft] : soft [Abdomen Tenderness] : non-tender [Costovertebral Angle Tenderness] : no ~M costovertebral angle tenderness [Urethral Meatus] : meatus normal [Scrotum] : the scrotum was normal [Urinary Bladder Findings] : the bladder was normal on palpation [Testes Mass (___cm)] : there were no testicular masses [Edema] : no peripheral edema [] : no respiratory distress [Respiration, Rhythm And Depth] : normal respiratory rhythm and effort [Exaggerated Use Of Accessory Muscles For Inspiration] : no accessory muscle use [Oriented To Time, Place, And Person] : oriented to person, place, and time [Affect] : the affect was normal [Mood] : the mood was normal [Not Anxious] : not anxious [Normal Station and Gait] : the gait and station were normal for the patient's age [No Focal Deficits] : no focal deficits [No Palpable Adenopathy] : no palpable adenopathy [FreeTextEntry1] : TIFFANIE deferred

## 2021-04-19 LAB
APPEARANCE: CLEAR
BACTERIA UR CULT: NORMAL
BACTERIA: NEGATIVE
BILIRUBIN URINE: NEGATIVE
BLOOD URINE: NEGATIVE
COLOR: YELLOW
GLUCOSE QUALITATIVE U: NEGATIVE
HYALINE CASTS: 5 /LPF
KETONES URINE: NEGATIVE
LEUKOCYTE ESTERASE URINE: NEGATIVE
MICROSCOPIC-UA: NORMAL
NITRITE URINE: NEGATIVE
PH URINE: 6
PROTEIN URINE: NEGATIVE
RED BLOOD CELLS URINE: 3 /HPF
SPECIFIC GRAVITY URINE: 1.02
SQUAMOUS EPITHELIAL CELLS: 1 /HPF
UROBILINOGEN URINE: NORMAL
WHITE BLOOD CELLS URINE: 1 /HPF

## 2021-05-11 ENCOUNTER — TRANSCRIPTION ENCOUNTER (OUTPATIENT)
Age: 86
End: 2021-05-11

## 2021-06-18 ENCOUNTER — APPOINTMENT (OUTPATIENT)
Dept: INTERNAL MEDICINE | Facility: CLINIC | Age: 86
End: 2021-06-18
Payer: MEDICARE

## 2021-06-18 VITALS
OXYGEN SATURATION: 96 % | WEIGHT: 170 LBS | TEMPERATURE: 98.6 F | DIASTOLIC BLOOD PRESSURE: 60 MMHG | SYSTOLIC BLOOD PRESSURE: 120 MMHG | RESPIRATION RATE: 12 BRPM | BODY MASS INDEX: 23.03 KG/M2 | HEIGHT: 72 IN | HEART RATE: 71 BPM

## 2021-06-18 DIAGNOSIS — D64.9 ANEMIA, UNSPECIFIED: ICD-10-CM

## 2021-06-18 DIAGNOSIS — R06.00 DYSPNEA, UNSPECIFIED: ICD-10-CM

## 2021-06-18 PROCEDURE — 36415 COLL VENOUS BLD VENIPUNCTURE: CPT

## 2021-06-18 PROCEDURE — 99214 OFFICE O/P EST MOD 30 MIN: CPT

## 2021-06-18 PROCEDURE — 99072 ADDL SUPL MATRL&STAF TM PHE: CPT

## 2021-06-18 NOTE — HISTORY OF PRESENT ILLNESS
[Spouse] : spouse [de-identified] : 87 yo M with dementia, colon ca s/p resection \par recently wife is noticing that he is having YEPEZ. tired a lot.  \par Spends most of the day in bed.  Lies down after walking the dog.\par This is a change over the past few months.  \par 2 ensure a day - weight is stable.

## 2021-06-18 NOTE — PLAN
[FreeTextEntry1] : YEPEZ \par  send for CXR\par  labs today\par  cardiology evaluation\par \par Dementia stable

## 2021-06-18 NOTE — REVIEW OF SYSTEMS
[Dyspnea on Exertion] : dyspnea on exertion [Joint Pain] : joint pain [Back Pain] : back pain [Negative] : Gastrointestinal [de-identified] : dry skin [de-identified] : memory issues.

## 2021-06-18 NOTE — PHYSICAL EXAM
[Normal] : no acute distress, well nourished, well developed and well-appearing [de-identified] : race edema

## 2021-06-22 ENCOUNTER — APPOINTMENT (OUTPATIENT)
Dept: HEART AND VASCULAR | Facility: CLINIC | Age: 86
End: 2021-06-22
Payer: MEDICARE

## 2021-06-22 VITALS
SYSTOLIC BLOOD PRESSURE: 110 MMHG | TEMPERATURE: 97.7 F | RESPIRATION RATE: 14 BRPM | BODY MASS INDEX: 23.03 KG/M2 | HEIGHT: 72 IN | HEART RATE: 70 BPM | DIASTOLIC BLOOD PRESSURE: 58 MMHG | WEIGHT: 170.04 LBS | OXYGEN SATURATION: 96 %

## 2021-06-22 DIAGNOSIS — Z86.73 PERSONAL HISTORY OF TRANSIENT ISCHEMIC ATTACK (TIA), AND CEREBRAL INFARCTION W/OUT RESIDUAL DEFICITS: ICD-10-CM

## 2021-06-22 PROCEDURE — 99072 ADDL SUPL MATRL&STAF TM PHE: CPT

## 2021-06-22 PROCEDURE — 93000 ELECTROCARDIOGRAM COMPLETE: CPT

## 2021-06-22 PROCEDURE — 99204 OFFICE O/P NEW MOD 45 MIN: CPT

## 2021-06-22 NOTE — HISTORY OF PRESENT ILLNESS
[FreeTextEntry1] : 87 year male who comes with his wife. He is on ASA 81 mg 2 tabs daily, multivitamin, Folic Acid, stool softner, Saw [place]met[place] and joint supplement. He had corona virus in early 2019 without pulmonary manifestation. He was noted to be fatigued and have SOB when walking in the street. He was able to walk 5-6 blocks until observed to be SOB 2 weeks. His balance is poor. He is scheduled to have labs tomorrow

## 2021-06-22 NOTE — ASSESSMENT
[FreeTextEntry1] : MARLENI, history of TIA and colon cancer--We discussed followup for an Echocardiogram and consideration of a coronary CTA if no other source for his symptoms is found

## 2021-06-22 NOTE — PHYSICAL EXAM
[Normal Conjunctiva] : normal conjunctiva [Normal S1, S2] : normal S1, S2 [No Edema] : no edema [Moves all extremities] : moves all extremities [Normal] : alert and oriented, normal memory

## 2021-06-23 ENCOUNTER — APPOINTMENT (OUTPATIENT)
Dept: NEUROLOGY | Facility: CLINIC | Age: 86
End: 2021-06-23
Payer: MEDICARE

## 2021-06-23 VITALS
HEART RATE: 69 BPM | DIASTOLIC BLOOD PRESSURE: 58 MMHG | SYSTOLIC BLOOD PRESSURE: 97 MMHG | WEIGHT: 170 LBS | TEMPERATURE: 98.1 F | BODY MASS INDEX: 23.03 KG/M2 | OXYGEN SATURATION: 99 % | HEIGHT: 72 IN

## 2021-06-23 DIAGNOSIS — R41.3 OTHER AMNESIA: ICD-10-CM

## 2021-06-23 PROCEDURE — 99072 ADDL SUPL MATRL&STAF TM PHE: CPT

## 2021-06-23 PROCEDURE — 99215 OFFICE O/P EST HI 40 MIN: CPT

## 2021-06-24 LAB
ALBUMIN SERPL ELPH-MCNC: 4.5 G/DL
ALP BLD-CCNC: 69 U/L
ALT SERPL-CCNC: 13 U/L
ANION GAP SERPL CALC-SCNC: 13 MMOL/L
AST SERPL-CCNC: 18 U/L
BASOPHILS # BLD AUTO: 0.1 K/UL
BASOPHILS NFR BLD AUTO: 1.3 %
BILIRUB SERPL-MCNC: 0.3 MG/DL
BUN SERPL-MCNC: 26 MG/DL
CALCIUM SERPL-MCNC: 9.8 MG/DL
CHLORIDE SERPL-SCNC: 102 MMOL/L
CO2 SERPL-SCNC: 25 MMOL/L
CREAT SERPL-MCNC: 1.38 MG/DL
EOSINOPHIL # BLD AUTO: 0.28 K/UL
EOSINOPHIL NFR BLD AUTO: 3.8 %
GLUCOSE SERPL-MCNC: 104 MG/DL
HCT VFR BLD CALC: 40.5 %
HGB BLD-MCNC: 13 G/DL
IMM GRANULOCYTES NFR BLD AUTO: 0.7 %
LYMPHOCYTES # BLD AUTO: 1.81 K/UL
LYMPHOCYTES NFR BLD AUTO: 24.3 %
MAN DIFF?: NORMAL
MCHC RBC-ENTMCNC: 31.6 PG
MCHC RBC-ENTMCNC: 32.1 GM/DL
MCV RBC AUTO: 98.3 FL
MONOCYTES # BLD AUTO: 0.95 K/UL
MONOCYTES NFR BLD AUTO: 12.8 %
NEUTROPHILS # BLD AUTO: 4.25 K/UL
NEUTROPHILS NFR BLD AUTO: 57.1 %
PLATELET # BLD AUTO: 294 K/UL
POTASSIUM SERPL-SCNC: 5.3 MMOL/L
PROT SERPL-MCNC: 7.4 G/DL
RBC # BLD: 4.12 M/UL
RBC # FLD: 13.8 %
SODIUM SERPL-SCNC: 141 MMOL/L
TSH SERPL-ACNC: 2.31 UIU/ML
VIT B12 SERPL-MCNC: 930 PG/ML
WBC # FLD AUTO: 7.44 K/UL

## 2021-06-28 ENCOUNTER — TRANSCRIPTION ENCOUNTER (OUTPATIENT)
Age: 86
End: 2021-06-28

## 2021-06-30 ENCOUNTER — APPOINTMENT (OUTPATIENT)
Dept: HEART AND VASCULAR | Facility: CLINIC | Age: 86
End: 2021-06-30
Payer: MEDICARE

## 2021-06-30 VITALS
WEIGHT: 172 LBS | HEIGHT: 72 IN | DIASTOLIC BLOOD PRESSURE: 52 MMHG | RESPIRATION RATE: 14 BRPM | OXYGEN SATURATION: 96 % | SYSTOLIC BLOOD PRESSURE: 102 MMHG | TEMPERATURE: 97.9 F | HEART RATE: 72 BPM | BODY MASS INDEX: 23.3 KG/M2

## 2021-06-30 DIAGNOSIS — R06.00 DYSPNEA, UNSPECIFIED: ICD-10-CM

## 2021-06-30 DIAGNOSIS — I34.0 NONRHEUMATIC MITRAL (VALVE) INSUFFICIENCY: ICD-10-CM

## 2021-06-30 PROCEDURE — 93306 TTE W/DOPPLER COMPLETE: CPT

## 2021-06-30 PROCEDURE — 99214 OFFICE O/P EST MOD 30 MIN: CPT

## 2021-06-30 PROCEDURE — 99072 ADDL SUPL MATRL&STAF TM PHE: CPT

## 2021-06-30 NOTE — PHYSICAL EXAM
[Normal Conjunctiva] : normal conjunctiva [Normal S1, S2] : normal S1, S2 [No Edema] : no edema [Moves all extremities] : moves all extremities [Normal] : alert and oriented, normal memory [de-identified] : kevan patel

## 2021-06-30 NOTE — HISTORY OF PRESENT ILLNESS
[FreeTextEntry1] : 87 year male who comes with his wife who reports that he is able to climb 2 flights of stairs and walk his dog for 5 minutes. His wife reports that he told her SOB on night ago but did not remember the symptoms hours later.

## 2021-06-30 NOTE — ASSESSMENT
[FreeTextEntry1] : At the time of the patient's visit an Echocardiogram was performed to evaluate LV function. It was a limited study due to chest wall discomfort. No AS seen. His EF in the views obtained was normal.\par \par At the time of the visit the results were reviewed with patient and his wife. We discussed the option of further investigation for CAD but will defer given his symptoms are not troublesome to him at this time and the risk of contrast to his kidneys. She plans to observe him as the current heat wave lifts

## 2021-08-24 ENCOUNTER — NON-APPOINTMENT (OUTPATIENT)
Age: 86
End: 2021-08-24

## 2021-08-25 ENCOUNTER — NON-APPOINTMENT (OUTPATIENT)
Age: 86
End: 2021-08-25

## 2021-08-25 ENCOUNTER — TRANSCRIPTION ENCOUNTER (OUTPATIENT)
Age: 86
End: 2021-08-25

## 2021-09-07 ENCOUNTER — TRANSCRIPTION ENCOUNTER (OUTPATIENT)
Age: 86
End: 2021-09-07

## 2021-09-08 ENCOUNTER — TRANSCRIPTION ENCOUNTER (OUTPATIENT)
Age: 86
End: 2021-09-08

## 2021-10-01 ENCOUNTER — APPOINTMENT (OUTPATIENT)
Dept: INTERNAL MEDICINE | Facility: CLINIC | Age: 86
End: 2021-10-01
Payer: MEDICARE

## 2021-10-01 VITALS
OXYGEN SATURATION: 96 % | DIASTOLIC BLOOD PRESSURE: 70 MMHG | SYSTOLIC BLOOD PRESSURE: 116 MMHG | WEIGHT: 167 LBS | RESPIRATION RATE: 14 BRPM | BODY MASS INDEX: 22.62 KG/M2 | HEIGHT: 72 IN | TEMPERATURE: 98.2 F | HEART RATE: 71 BPM

## 2021-10-01 DIAGNOSIS — R10.9 UNSPECIFIED ABDOMINAL PAIN: ICD-10-CM

## 2021-10-01 PROCEDURE — 99213 OFFICE O/P EST LOW 20 MIN: CPT

## 2021-10-01 NOTE — PHYSICAL EXAM
[Normal] : no acute distress, well nourished, well developed and well-appearing [Soft] : abdomen soft [de-identified] : tender rlq with slight bulging with cough when upright

## 2021-10-01 NOTE — HISTORY OF PRESENT ILLNESS
[Spouse] : spouse [de-identified] : 88 yo M with dementia, colon ca s/p resection \par \par here with abd pain.  for 2 weeks reports skin was sensitive.  right lower abdomen.  \par normal bowel movements

## 2021-10-01 NOTE — PLAN
[FreeTextEntry1] : right sided abd pain  suspicion for hernia\par with colon ca history send for pelvic imaging\par

## 2021-10-04 ENCOUNTER — TRANSCRIPTION ENCOUNTER (OUTPATIENT)
Age: 86
End: 2021-10-04

## 2021-10-14 ENCOUNTER — TRANSCRIPTION ENCOUNTER (OUTPATIENT)
Age: 86
End: 2021-10-14

## 2021-10-15 ENCOUNTER — TRANSCRIPTION ENCOUNTER (OUTPATIENT)
Age: 86
End: 2021-10-15

## 2021-10-18 ENCOUNTER — TRANSCRIPTION ENCOUNTER (OUTPATIENT)
Age: 86
End: 2021-10-18

## 2021-10-20 ENCOUNTER — TRANSCRIPTION ENCOUNTER (OUTPATIENT)
Age: 86
End: 2021-10-20

## 2021-10-21 ENCOUNTER — TRANSCRIPTION ENCOUNTER (OUTPATIENT)
Age: 86
End: 2021-10-21

## 2021-10-22 ENCOUNTER — TRANSCRIPTION ENCOUNTER (OUTPATIENT)
Age: 86
End: 2021-10-22

## 2021-10-25 ENCOUNTER — TRANSCRIPTION ENCOUNTER (OUTPATIENT)
Age: 86
End: 2021-10-25

## 2021-10-29 ENCOUNTER — TRANSCRIPTION ENCOUNTER (OUTPATIENT)
Age: 86
End: 2021-10-29

## 2022-01-01 ENCOUNTER — TRANSCRIPTION ENCOUNTER (OUTPATIENT)
Age: 87
End: 2022-01-01

## 2022-01-01 ENCOUNTER — NON-APPOINTMENT (OUTPATIENT)
Age: 87
End: 2022-01-01

## 2022-01-01 ENCOUNTER — INPATIENT (INPATIENT)
Facility: HOSPITAL | Age: 87
LOS: 9 days | Discharge: NURSING, REHAB OR LONG-TERM CARE | DRG: 682 | End: 2022-12-16
Attending: STUDENT IN AN ORGANIZED HEALTH CARE EDUCATION/TRAINING PROGRAM | Admitting: STUDENT IN AN ORGANIZED HEALTH CARE EDUCATION/TRAINING PROGRAM
Payer: MEDICARE

## 2022-01-01 ENCOUNTER — LABORATORY RESULT (OUTPATIENT)
Age: 87
End: 2022-01-01

## 2022-01-01 ENCOUNTER — APPOINTMENT (OUTPATIENT)
Dept: NEUROLOGY | Facility: CLINIC | Age: 87
End: 2022-01-01

## 2022-01-01 ENCOUNTER — APPOINTMENT (OUTPATIENT)
Dept: NEUROLOGY | Facility: CLINIC | Age: 87
End: 2022-01-01
Payer: MEDICARE

## 2022-01-01 ENCOUNTER — APPOINTMENT (OUTPATIENT)
Dept: INTERNAL MEDICINE | Facility: CLINIC | Age: 87
End: 2022-01-01

## 2022-01-01 ENCOUNTER — EMERGENCY (EMERGENCY)
Facility: HOSPITAL | Age: 87
LOS: 1 days | Discharge: HOME | End: 2022-01-01
Attending: EMERGENCY MEDICINE | Admitting: EMERGENCY MEDICINE
Payer: MEDICARE

## 2022-01-01 ENCOUNTER — INPATIENT (INPATIENT)
Facility: HOSPITAL | Age: 87
LOS: 3 days | Discharge: HOME HEALTH | DRG: 391 | End: 2022-07-22
Attending: STUDENT IN AN ORGANIZED HEALTH CARE EDUCATION/TRAINING PROGRAM | Admitting: INTERNAL MEDICINE
Payer: MEDICARE

## 2022-01-01 ENCOUNTER — APPOINTMENT (OUTPATIENT)
Dept: INTERNAL MEDICINE | Facility: CLINIC | Age: 87
End: 2022-01-01
Payer: MEDICARE

## 2022-01-01 VITALS
DIASTOLIC BLOOD PRESSURE: 63 MMHG | SYSTOLIC BLOOD PRESSURE: 109 MMHG | RESPIRATION RATE: 18 BRPM | TEMPERATURE: 98 F | HEIGHT: 72 IN | OXYGEN SATURATION: 95 % | WEIGHT: 156.97 LBS | HEART RATE: 80 BPM

## 2022-01-01 VITALS
TEMPERATURE: 96.9 F | OXYGEN SATURATION: 96 % | WEIGHT: 164 LBS | RESPIRATION RATE: 14 BRPM | SYSTOLIC BLOOD PRESSURE: 100 MMHG | BODY MASS INDEX: 22.21 KG/M2 | HEART RATE: 85 BPM | HEIGHT: 72 IN | DIASTOLIC BLOOD PRESSURE: 70 MMHG

## 2022-01-01 VITALS
SYSTOLIC BLOOD PRESSURE: 134 MMHG | RESPIRATION RATE: 19 BRPM | TEMPERATURE: 99 F | HEART RATE: 97 BPM | OXYGEN SATURATION: 92 % | DIASTOLIC BLOOD PRESSURE: 73 MMHG

## 2022-01-01 VITALS
DIASTOLIC BLOOD PRESSURE: 68 MMHG | OXYGEN SATURATION: 96 % | SYSTOLIC BLOOD PRESSURE: 112 MMHG | HEART RATE: 70 BPM | HEIGHT: 72 IN | TEMPERATURE: 98 F | WEIGHT: 154.98 LBS | RESPIRATION RATE: 16 BRPM

## 2022-01-01 VITALS
OXYGEN SATURATION: 98 % | HEART RATE: 66 BPM | SYSTOLIC BLOOD PRESSURE: 144 MMHG | TEMPERATURE: 98 F | RESPIRATION RATE: 18 BRPM | DIASTOLIC BLOOD PRESSURE: 78 MMHG

## 2022-01-01 VITALS
SYSTOLIC BLOOD PRESSURE: 113 MMHG | HEIGHT: 72 IN | HEART RATE: 63 BPM | RESPIRATION RATE: 18 BRPM | TEMPERATURE: 98 F | OXYGEN SATURATION: 100 % | WEIGHT: 164.91 LBS | DIASTOLIC BLOOD PRESSURE: 58 MMHG

## 2022-01-01 VITALS
WEIGHT: 164 LBS | DIASTOLIC BLOOD PRESSURE: 60 MMHG | BODY MASS INDEX: 22.21 KG/M2 | TEMPERATURE: 97.7 F | SYSTOLIC BLOOD PRESSURE: 110 MMHG | HEIGHT: 72 IN

## 2022-01-01 VITALS
SYSTOLIC BLOOD PRESSURE: 99 MMHG | HEIGHT: 72 IN | HEART RATE: 65 BPM | OXYGEN SATURATION: 97 % | WEIGHT: 164 LBS | TEMPERATURE: 97.8 F | BODY MASS INDEX: 22.21 KG/M2 | DIASTOLIC BLOOD PRESSURE: 63 MMHG

## 2022-01-01 DIAGNOSIS — R10.9 UNSPECIFIED ABDOMINAL PAIN: ICD-10-CM

## 2022-01-01 DIAGNOSIS — K59.00 CONSTIPATION, UNSPECIFIED: ICD-10-CM

## 2022-01-01 DIAGNOSIS — I95.9 HYPOTENSION, UNSPECIFIED: ICD-10-CM

## 2022-01-01 DIAGNOSIS — R53.1 WEAKNESS: ICD-10-CM

## 2022-01-01 DIAGNOSIS — N39.0 URINARY TRACT INFECTION, SITE NOT SPECIFIED: ICD-10-CM

## 2022-01-01 DIAGNOSIS — A41.9 SEPSIS, UNSPECIFIED ORGANISM: ICD-10-CM

## 2022-01-01 DIAGNOSIS — Z90.49 ACQUIRED ABSENCE OF OTHER SPECIFIED PARTS OF DIGESTIVE TRACT: Chronic | ICD-10-CM

## 2022-01-01 DIAGNOSIS — Z00.01 ENCOUNTER FOR GENERAL ADULT MEDICAL EXAMINATION WITH ABNORMAL FINDINGS: ICD-10-CM

## 2022-01-01 DIAGNOSIS — E44.0 MODERATE PROTEIN-CALORIE MALNUTRITION: ICD-10-CM

## 2022-01-01 DIAGNOSIS — D72.829 ELEVATED WHITE BLOOD CELL COUNT, UNSPECIFIED: ICD-10-CM

## 2022-01-01 DIAGNOSIS — R53.2 FUNCTIONAL QUADRIPLEGIA: ICD-10-CM

## 2022-01-01 DIAGNOSIS — R41.89 OTHER SYMPTOMS AND SIGNS INVOLVING COGNITIVE FUNCTIONS AND AWARENESS: ICD-10-CM

## 2022-01-01 DIAGNOSIS — R33.9 RETENTION OF URINE, UNSPECIFIED: ICD-10-CM

## 2022-01-01 DIAGNOSIS — N17.9 ACUTE KIDNEY FAILURE, UNSPECIFIED: ICD-10-CM

## 2022-01-01 DIAGNOSIS — Z86.73 PERSONAL HISTORY OF TRANSIENT ISCHEMIC ATTACK (TIA), AND CEREBRAL INFARCTION WITHOUT RESIDUAL DEFICITS: ICD-10-CM

## 2022-01-01 DIAGNOSIS — M79.89 OTHER SPECIFIED SOFT TISSUE DISORDERS: ICD-10-CM

## 2022-01-01 DIAGNOSIS — N17.0 ACUTE KIDNEY FAILURE WITH TUBULAR NECROSIS: ICD-10-CM

## 2022-01-01 DIAGNOSIS — N40.0 BENIGN PROSTATIC HYPERPLASIA WITHOUT LOWER URINARY TRACT SYMPTOMS: ICD-10-CM

## 2022-01-01 DIAGNOSIS — D64.9 ANEMIA, UNSPECIFIED: ICD-10-CM

## 2022-01-01 DIAGNOSIS — M79.675 PAIN IN LEFT TOE(S): ICD-10-CM

## 2022-01-01 DIAGNOSIS — J69.0 PNEUMONITIS DUE TO INHALATION OF FOOD AND VOMIT: ICD-10-CM

## 2022-01-01 DIAGNOSIS — R21 RASH AND OTHER NONSPECIFIC SKIN ERUPTION: ICD-10-CM

## 2022-01-01 DIAGNOSIS — F03.90 UNSPECIFIED DEMENTIA WITHOUT BEHAVIORAL DISTURBANCE: ICD-10-CM

## 2022-01-01 DIAGNOSIS — E43 UNSPECIFIED SEVERE PROTEIN-CALORIE MALNUTRITION: ICD-10-CM

## 2022-01-01 DIAGNOSIS — Z79.82 LONG TERM (CURRENT) USE OF ASPIRIN: ICD-10-CM

## 2022-01-01 DIAGNOSIS — Z85.038 PERSONAL HISTORY OF OTHER MALIGNANT NEOPLASM OF LARGE INTESTINE: ICD-10-CM

## 2022-01-01 DIAGNOSIS — N40.1 BENIGN PROSTATIC HYPERPLASIA WITH LOWER URINARY TRACT SYMPTOMS: ICD-10-CM

## 2022-01-01 DIAGNOSIS — R46.89 OTHER SYMPTOMS AND SIGNS INVOLVING COGNITIVE FUNCTIONS AND AWARENESS: ICD-10-CM

## 2022-01-01 DIAGNOSIS — I95.1 ORTHOSTATIC HYPOTENSION: ICD-10-CM

## 2022-01-01 DIAGNOSIS — Z79.899 OTHER LONG TERM (CURRENT) DRUG THERAPY: ICD-10-CM

## 2022-01-01 DIAGNOSIS — R53.81 OTHER MALAISE: ICD-10-CM

## 2022-01-01 DIAGNOSIS — R33.8 OTHER RETENTION OF URINE: ICD-10-CM

## 2022-01-01 DIAGNOSIS — Z29.9 ENCOUNTER FOR PROPHYLACTIC MEASURES, UNSPECIFIED: ICD-10-CM

## 2022-01-01 DIAGNOSIS — Z71.89 OTHER SPECIFIED COUNSELING: ICD-10-CM

## 2022-01-01 DIAGNOSIS — R41.3 OTHER AMNESIA: ICD-10-CM

## 2022-01-01 DIAGNOSIS — L29.9 PRURITUS, UNSPECIFIED: ICD-10-CM

## 2022-01-01 DIAGNOSIS — G93.41 METABOLIC ENCEPHALOPATHY: ICD-10-CM

## 2022-01-01 DIAGNOSIS — Z51.5 ENCOUNTER FOR PALLIATIVE CARE: ICD-10-CM

## 2022-01-01 DIAGNOSIS — Z00.00 ENCOUNTER FOR GENERAL ADULT MEDICAL EXAMINATION WITHOUT ABNORMAL FINDINGS: ICD-10-CM

## 2022-01-01 DIAGNOSIS — R26.81 UNSTEADINESS ON FEET: ICD-10-CM

## 2022-01-01 DIAGNOSIS — Z87.891 PERSONAL HISTORY OF NICOTINE DEPENDENCE: ICD-10-CM

## 2022-01-01 DIAGNOSIS — Z92.21 PERSONAL HISTORY OF ANTINEOPLASTIC CHEMOTHERAPY: ICD-10-CM

## 2022-01-01 DIAGNOSIS — R53.83 OTHER FATIGUE: ICD-10-CM

## 2022-01-01 LAB
-  AMPICILLIN/SULBACTAM: SIGNIFICANT CHANGE UP
-  AMPICILLIN: SIGNIFICANT CHANGE UP
-  CEFAZOLIN: SIGNIFICANT CHANGE UP
-  CEFTRIAXONE: SIGNIFICANT CHANGE UP
-  CIPROFLOXACIN: SIGNIFICANT CHANGE UP
-  ERTAPENEM: SIGNIFICANT CHANGE UP
-  GENTAMICIN: SIGNIFICANT CHANGE UP
-  NITROFURANTOIN: SIGNIFICANT CHANGE UP
-  PIPERACILLIN/TAZOBACTAM: SIGNIFICANT CHANGE UP
-  TOBRAMYCIN: SIGNIFICANT CHANGE UP
-  TRIMETHOPRIM/SULFAMETHOXAZOLE: SIGNIFICANT CHANGE UP
ALBUMIN SERPL ELPH-MCNC: 3.3 G/DL — SIGNIFICANT CHANGE UP (ref 3.3–5)
ALBUMIN SERPL ELPH-MCNC: 3.9 G/DL — SIGNIFICANT CHANGE UP (ref 3.3–5)
ALBUMIN SERPL ELPH-MCNC: 3.9 G/DL — SIGNIFICANT CHANGE UP (ref 3.3–5)
ALBUMIN SERPL ELPH-MCNC: 4.3 G/DL — SIGNIFICANT CHANGE UP (ref 3.3–5)
ALBUMIN SERPL ELPH-MCNC: 4.5 G/DL
ALP BLD-CCNC: 61 U/L
ALP SERPL-CCNC: 59 U/L — SIGNIFICANT CHANGE UP (ref 40–120)
ALP SERPL-CCNC: 60 U/L — SIGNIFICANT CHANGE UP (ref 40–120)
ALP SERPL-CCNC: 66 U/L — SIGNIFICANT CHANGE UP (ref 40–120)
ALP SERPL-CCNC: 72 U/L — SIGNIFICANT CHANGE UP (ref 40–120)
ALT FLD-CCNC: 13 U/L — SIGNIFICANT CHANGE UP (ref 10–45)
ALT FLD-CCNC: 14 U/L — SIGNIFICANT CHANGE UP (ref 10–45)
ALT FLD-CCNC: 14 U/L — SIGNIFICANT CHANGE UP (ref 10–45)
ALT FLD-CCNC: 19 U/L — SIGNIFICANT CHANGE UP (ref 10–45)
ALT SERPL-CCNC: 13 U/L
ANION GAP SERPL CALC-SCNC: 10 MMOL/L — SIGNIFICANT CHANGE UP (ref 5–17)
ANION GAP SERPL CALC-SCNC: 11 MMOL/L — SIGNIFICANT CHANGE UP (ref 5–17)
ANION GAP SERPL CALC-SCNC: 13 MMOL/L
ANION GAP SERPL CALC-SCNC: 13 MMOL/L — SIGNIFICANT CHANGE UP (ref 5–17)
ANION GAP SERPL CALC-SCNC: 13 MMOL/L — SIGNIFICANT CHANGE UP (ref 5–17)
ANION GAP SERPL CALC-SCNC: 14 MMOL/L — SIGNIFICANT CHANGE UP (ref 5–17)
ANION GAP SERPL CALC-SCNC: 8 MMOL/L — SIGNIFICANT CHANGE UP (ref 5–17)
ANION GAP SERPL CALC-SCNC: 9 MMOL/L — SIGNIFICANT CHANGE UP (ref 5–17)
ANISOCYTOSIS BLD QL: SLIGHT — SIGNIFICANT CHANGE UP
APPEARANCE UR: ABNORMAL
APPEARANCE UR: CLEAR — SIGNIFICANT CHANGE UP
APPEARANCE UR: CLEAR — SIGNIFICANT CHANGE UP
APTT BLD: 26.3 SEC — LOW (ref 27.5–35.5)
APTT BLD: 32.6 SEC — SIGNIFICANT CHANGE UP (ref 27.5–35.5)
AST SERPL-CCNC: 18 U/L
AST SERPL-CCNC: 18 U/L — SIGNIFICANT CHANGE UP (ref 10–40)
AST SERPL-CCNC: 20 U/L — SIGNIFICANT CHANGE UP (ref 10–40)
AST SERPL-CCNC: 25 U/L — SIGNIFICANT CHANGE UP (ref 10–40)
AST SERPL-CCNC: 45 U/L — HIGH (ref 10–40)
BACTERIA # UR AUTO: ABNORMAL /HPF
BACTERIA # UR AUTO: PRESENT /HPF
BACTERIA # UR AUTO: PRESENT /HPF
BACTERIA # UR AUTO: SIGNIFICANT CHANGE UP /HPF
BASE EXCESS BLDV CALC-SCNC: 3.5 MMOL/L — HIGH (ref -2–3)
BASOPHILS # BLD AUTO: 0 K/UL — SIGNIFICANT CHANGE UP (ref 0–0.2)
BASOPHILS # BLD AUTO: 0.09 K/UL — SIGNIFICANT CHANGE UP (ref 0–0.2)
BASOPHILS # BLD AUTO: 0.09 K/UL — SIGNIFICANT CHANGE UP (ref 0–0.2)
BASOPHILS NFR BLD AUTO: 0 % — SIGNIFICANT CHANGE UP (ref 0–2)
BASOPHILS NFR BLD AUTO: 0.7 % — SIGNIFICANT CHANGE UP (ref 0–2)
BASOPHILS NFR BLD AUTO: 0.8 % — SIGNIFICANT CHANGE UP (ref 0–2)
BILIRUB SERPL-MCNC: 0.2 MG/DL — SIGNIFICANT CHANGE UP (ref 0.2–1.2)
BILIRUB SERPL-MCNC: 0.3 MG/DL
BILIRUB SERPL-MCNC: 0.4 MG/DL — SIGNIFICANT CHANGE UP (ref 0.2–1.2)
BILIRUB SERPL-MCNC: 0.6 MG/DL — SIGNIFICANT CHANGE UP (ref 0.2–1.2)
BILIRUB SERPL-MCNC: 1.2 MG/DL — SIGNIFICANT CHANGE UP (ref 0.2–1.2)
BILIRUB UR-MCNC: NEGATIVE — SIGNIFICANT CHANGE UP
BLD GP AB SCN SERPL QL: NEGATIVE — SIGNIFICANT CHANGE UP
BLD GP AB SCN SERPL QL: NEGATIVE — SIGNIFICANT CHANGE UP
BUN SERPL-MCNC: 23 MG/DL — SIGNIFICANT CHANGE UP (ref 7–23)
BUN SERPL-MCNC: 24 MG/DL — HIGH (ref 7–23)
BUN SERPL-MCNC: 25 MG/DL — HIGH (ref 7–23)
BUN SERPL-MCNC: 26 MG/DL — HIGH (ref 7–23)
BUN SERPL-MCNC: 27 MG/DL — HIGH (ref 7–23)
BUN SERPL-MCNC: 28 MG/DL — HIGH (ref 7–23)
BUN SERPL-MCNC: 32 MG/DL
BUN SERPL-MCNC: 32 MG/DL — HIGH (ref 7–23)
BUN SERPL-MCNC: 34 MG/DL — HIGH (ref 7–23)
BUN SERPL-MCNC: 38 MG/DL — HIGH (ref 7–23)
BURR CELLS BLD QL SMEAR: PRESENT — SIGNIFICANT CHANGE UP
CA-I SERPL-SCNC: 1.21 MMOL/L — SIGNIFICANT CHANGE UP (ref 1.15–1.33)
CALCIUM SERPL-MCNC: 10 MG/DL — SIGNIFICANT CHANGE UP (ref 8.4–10.5)
CALCIUM SERPL-MCNC: 8.6 MG/DL — SIGNIFICANT CHANGE UP (ref 8.4–10.5)
CALCIUM SERPL-MCNC: 8.8 MG/DL — SIGNIFICANT CHANGE UP (ref 8.4–10.5)
CALCIUM SERPL-MCNC: 9.1 MG/DL — SIGNIFICANT CHANGE UP (ref 8.4–10.5)
CALCIUM SERPL-MCNC: 9.1 MG/DL — SIGNIFICANT CHANGE UP (ref 8.4–10.5)
CALCIUM SERPL-MCNC: 9.2 MG/DL — SIGNIFICANT CHANGE UP (ref 8.4–10.5)
CALCIUM SERPL-MCNC: 9.3 MG/DL
CALCIUM SERPL-MCNC: 9.3 MG/DL — SIGNIFICANT CHANGE UP (ref 8.4–10.5)
CALCIUM SERPL-MCNC: 9.4 MG/DL — SIGNIFICANT CHANGE UP (ref 8.4–10.5)
CALCIUM SERPL-MCNC: 9.4 MG/DL — SIGNIFICANT CHANGE UP (ref 8.4–10.5)
CALCIUM SERPL-MCNC: 9.5 MG/DL — SIGNIFICANT CHANGE UP (ref 8.4–10.5)
CALCIUM SERPL-MCNC: 9.5 MG/DL — SIGNIFICANT CHANGE UP (ref 8.4–10.5)
CHLORIDE SERPL-SCNC: 103 MMOL/L — SIGNIFICANT CHANGE UP (ref 96–108)
CHLORIDE SERPL-SCNC: 103 MMOL/L — SIGNIFICANT CHANGE UP (ref 96–108)
CHLORIDE SERPL-SCNC: 104 MMOL/L
CHLORIDE SERPL-SCNC: 106 MMOL/L — SIGNIFICANT CHANGE UP (ref 96–108)
CHLORIDE SERPL-SCNC: 107 MMOL/L — SIGNIFICANT CHANGE UP (ref 96–108)
CHLORIDE SERPL-SCNC: 109 MMOL/L — HIGH (ref 96–108)
CK MB CFR SERPL CALC: 2.4 NG/ML — SIGNIFICANT CHANGE UP (ref 0–6.7)
CK SERPL-CCNC: 81 U/L — SIGNIFICANT CHANGE UP (ref 30–200)
CO2 BLDV-SCNC: 30.5 MMOL/L — HIGH (ref 22–26)
CO2 SERPL-SCNC: 17 MMOL/L — LOW (ref 22–31)
CO2 SERPL-SCNC: 22 MMOL/L — SIGNIFICANT CHANGE UP (ref 22–31)
CO2 SERPL-SCNC: 23 MMOL/L — SIGNIFICANT CHANGE UP (ref 22–31)
CO2 SERPL-SCNC: 24 MMOL/L — SIGNIFICANT CHANGE UP (ref 22–31)
CO2 SERPL-SCNC: 25 MMOL/L
CO2 SERPL-SCNC: 25 MMOL/L — SIGNIFICANT CHANGE UP (ref 22–31)
CO2 SERPL-SCNC: 25 MMOL/L — SIGNIFICANT CHANGE UP (ref 22–31)
CO2 SERPL-SCNC: 26 MMOL/L — SIGNIFICANT CHANGE UP (ref 22–31)
CO2 SERPL-SCNC: 27 MMOL/L — SIGNIFICANT CHANGE UP (ref 22–31)
CO2 SERPL-SCNC: 29 MMOL/L — SIGNIFICANT CHANGE UP (ref 22–31)
COLOR SPEC: YELLOW — SIGNIFICANT CHANGE UP
COMMENT - URINE: SIGNIFICANT CHANGE UP
COMMENT - URINE: SIGNIFICANT CHANGE UP
CREAT ?TM UR-MCNC: 120 MG/DL — SIGNIFICANT CHANGE UP
CREAT ?TM UR-MCNC: 89 MG/DL — SIGNIFICANT CHANGE UP
CREAT SERPL-MCNC: 1.14 MG/DL — SIGNIFICANT CHANGE UP (ref 0.5–1.3)
CREAT SERPL-MCNC: 1.29 MG/DL — SIGNIFICANT CHANGE UP (ref 0.5–1.3)
CREAT SERPL-MCNC: 1.36 MG/DL — HIGH (ref 0.5–1.3)
CREAT SERPL-MCNC: 1.38 MG/DL
CREAT SERPL-MCNC: 1.39 MG/DL — HIGH (ref 0.5–1.3)
CREAT SERPL-MCNC: 1.39 MG/DL — HIGH (ref 0.5–1.3)
CREAT SERPL-MCNC: 1.49 MG/DL — HIGH (ref 0.5–1.3)
CREAT SERPL-MCNC: 1.51 MG/DL — HIGH (ref 0.5–1.3)
CREAT SERPL-MCNC: 1.53 MG/DL — HIGH (ref 0.5–1.3)
CREAT SERPL-MCNC: 1.58 MG/DL — HIGH (ref 0.5–1.3)
CREAT SERPL-MCNC: 1.6 MG/DL — HIGH (ref 0.5–1.3)
CREAT SERPL-MCNC: 1.73 MG/DL — HIGH (ref 0.5–1.3)
CULTURE RESULTS: NO GROWTH — SIGNIFICANT CHANGE UP
CULTURE RESULTS: NO GROWTH — SIGNIFICANT CHANGE UP
CULTURE RESULTS: SIGNIFICANT CHANGE UP
DIFF PNL FLD: ABNORMAL
DIFF PNL FLD: NEGATIVE — SIGNIFICANT CHANGE UP
EGFR: 38 ML/MIN/1.73M2 — LOW
EGFR: 41 ML/MIN/1.73M2 — LOW
EGFR: 42 ML/MIN/1.73M2 — LOW
EGFR: 43 ML/MIN/1.73M2 — LOW
EGFR: 44 ML/MIN/1.73M2 — LOW
EGFR: 45 ML/MIN/1.73M2 — LOW
EGFR: 49 ML/MIN/1.73M2 — LOW
EGFR: 49 ML/MIN/1.73M2 — LOW
EGFR: 50 ML/MIN/1.73M2 — LOW
EGFR: 53 ML/MIN/1.73M2 — LOW
EGFR: 62 ML/MIN/1.73M2 — SIGNIFICANT CHANGE UP
EOSINOPHIL # BLD AUTO: 0 K/UL — SIGNIFICANT CHANGE UP (ref 0–0.5)
EOSINOPHIL # BLD AUTO: 0.14 K/UL — SIGNIFICANT CHANGE UP (ref 0–0.5)
EOSINOPHIL # BLD AUTO: 0.19 K/UL — SIGNIFICANT CHANGE UP (ref 0–0.5)
EOSINOPHIL NFR BLD AUTO: 0 % — SIGNIFICANT CHANGE UP (ref 0–6)
EOSINOPHIL NFR BLD AUTO: 1.1 % — SIGNIFICANT CHANGE UP (ref 0–6)
EOSINOPHIL NFR BLD AUTO: 1.8 % — SIGNIFICANT CHANGE UP (ref 0–6)
EPI CELLS # UR: ABNORMAL /HPF (ref 0–5)
EPI CELLS # UR: ABNORMAL /HPF (ref 0–5)
EPI CELLS # UR: SIGNIFICANT CHANGE UP /HPF (ref 0–5)
EPI CELLS # UR: SIGNIFICANT CHANGE UP /HPF (ref 0–5)
GAS PNL BLDV: 136 MMOL/L — SIGNIFICANT CHANGE UP (ref 136–145)
GAS PNL BLDV: SIGNIFICANT CHANGE UP
GLUCOSE BLDC GLUCOMTR-MCNC: 102 MG/DL — HIGH (ref 70–99)
GLUCOSE BLDC GLUCOMTR-MCNC: 103 MG/DL — HIGH (ref 70–99)
GLUCOSE BLDC GLUCOMTR-MCNC: 105 MG/DL — HIGH (ref 70–99)
GLUCOSE BLDC GLUCOMTR-MCNC: 105 MG/DL — HIGH (ref 70–99)
GLUCOSE BLDC GLUCOMTR-MCNC: 106 MG/DL — HIGH (ref 70–99)
GLUCOSE BLDC GLUCOMTR-MCNC: 108 MG/DL — HIGH (ref 70–99)
GLUCOSE BLDC GLUCOMTR-MCNC: 109 MG/DL — HIGH (ref 70–99)
GLUCOSE BLDC GLUCOMTR-MCNC: 112 MG/DL — HIGH (ref 70–99)
GLUCOSE BLDC GLUCOMTR-MCNC: 113 MG/DL — HIGH (ref 70–99)
GLUCOSE BLDC GLUCOMTR-MCNC: 113 MG/DL — HIGH (ref 70–99)
GLUCOSE BLDC GLUCOMTR-MCNC: 114 MG/DL — HIGH (ref 70–99)
GLUCOSE BLDC GLUCOMTR-MCNC: 114 MG/DL — HIGH (ref 70–99)
GLUCOSE BLDC GLUCOMTR-MCNC: 119 MG/DL — HIGH (ref 70–99)
GLUCOSE BLDC GLUCOMTR-MCNC: 119 MG/DL — HIGH (ref 70–99)
GLUCOSE BLDC GLUCOMTR-MCNC: 121 MG/DL — HIGH (ref 70–99)
GLUCOSE BLDC GLUCOMTR-MCNC: 122 MG/DL — HIGH (ref 70–99)
GLUCOSE BLDC GLUCOMTR-MCNC: 129 MG/DL — HIGH (ref 70–99)
GLUCOSE BLDC GLUCOMTR-MCNC: 131 MG/DL — HIGH (ref 70–99)
GLUCOSE BLDC GLUCOMTR-MCNC: 132 MG/DL — HIGH (ref 70–99)
GLUCOSE BLDC GLUCOMTR-MCNC: 138 MG/DL — HIGH (ref 70–99)
GLUCOSE BLDC GLUCOMTR-MCNC: 150 MG/DL — HIGH (ref 70–99)
GLUCOSE BLDC GLUCOMTR-MCNC: 153 MG/DL — HIGH (ref 70–99)
GLUCOSE BLDC GLUCOMTR-MCNC: 99 MG/DL — SIGNIFICANT CHANGE UP (ref 70–99)
GLUCOSE SERPL-MCNC: 107 MG/DL — HIGH (ref 70–99)
GLUCOSE SERPL-MCNC: 108 MG/DL — HIGH (ref 70–99)
GLUCOSE SERPL-MCNC: 109 MG/DL — HIGH (ref 70–99)
GLUCOSE SERPL-MCNC: 109 MG/DL — HIGH (ref 70–99)
GLUCOSE SERPL-MCNC: 116 MG/DL — HIGH (ref 70–99)
GLUCOSE SERPL-MCNC: 120 MG/DL — HIGH (ref 70–99)
GLUCOSE SERPL-MCNC: 123 MG/DL — HIGH (ref 70–99)
GLUCOSE SERPL-MCNC: 124 MG/DL — HIGH (ref 70–99)
GLUCOSE SERPL-MCNC: 125 MG/DL — HIGH (ref 70–99)
GLUCOSE SERPL-MCNC: 86 MG/DL
GLUCOSE SERPL-MCNC: 91 MG/DL — SIGNIFICANT CHANGE UP (ref 70–99)
GLUCOSE SERPL-MCNC: 98 MG/DL — SIGNIFICANT CHANGE UP (ref 70–99)
GLUCOSE UR QL: NEGATIVE — SIGNIFICANT CHANGE UP
HCO3 BLDV-SCNC: 29 MMOL/L — SIGNIFICANT CHANGE UP (ref 22–29)
HCT VFR BLD CALC: 26.2 % — LOW (ref 39–50)
HCT VFR BLD CALC: 29.5 % — LOW (ref 39–50)
HCT VFR BLD CALC: 30.9 % — LOW (ref 39–50)
HCT VFR BLD CALC: 31.4 % — LOW (ref 39–50)
HCT VFR BLD CALC: 31.6 % — LOW (ref 39–50)
HCT VFR BLD CALC: 31.7 % — LOW (ref 39–50)
HCT VFR BLD CALC: 31.7 % — LOW (ref 39–50)
HCT VFR BLD CALC: 33.4 % — LOW (ref 39–50)
HCT VFR BLD CALC: 34.2 % — LOW (ref 39–50)
HCT VFR BLD CALC: 34.9 % — LOW (ref 39–50)
HCT VFR BLD CALC: 36.1 % — LOW (ref 39–50)
HGB BLD-MCNC: 10.1 G/DL — LOW (ref 13–17)
HGB BLD-MCNC: 10.2 G/DL — LOW (ref 13–17)
HGB BLD-MCNC: 10.2 G/DL — LOW (ref 13–17)
HGB BLD-MCNC: 10.4 G/DL — LOW (ref 13–17)
HGB BLD-MCNC: 10.4 G/DL — LOW (ref 13–17)
HGB BLD-MCNC: 11 G/DL — LOW (ref 13–17)
HGB BLD-MCNC: 11 G/DL — LOW (ref 13–17)
HGB BLD-MCNC: 11.3 G/DL — LOW (ref 13–17)
HGB BLD-MCNC: 11.9 G/DL — LOW (ref 13–17)
HGB BLD-MCNC: 8.1 G/DL — LOW (ref 13–17)
HGB BLD-MCNC: 9.7 G/DL — LOW (ref 13–17)
IMM GRANULOCYTES NFR BLD AUTO: 0.6 % — SIGNIFICANT CHANGE UP (ref 0–0.9)
IMM GRANULOCYTES NFR BLD AUTO: 0.6 % — SIGNIFICANT CHANGE UP (ref 0–1.5)
INR BLD: 1.03 — SIGNIFICANT CHANGE UP (ref 0.88–1.16)
INR BLD: 1.09 — SIGNIFICANT CHANGE UP (ref 0.88–1.16)
KETONES UR-MCNC: ABNORMAL MG/DL
KETONES UR-MCNC: NEGATIVE — SIGNIFICANT CHANGE UP
KETONES UR-MCNC: NEGATIVE — SIGNIFICANT CHANGE UP
LACTATE SERPL-SCNC: 1.3 MMOL/L — SIGNIFICANT CHANGE UP (ref 0.5–2)
LACTATE SERPL-SCNC: 1.5 MMOL/L — SIGNIFICANT CHANGE UP (ref 0.5–2)
LEUKOCYTE ESTERASE UR-ACNC: ABNORMAL
LEUKOCYTE ESTERASE UR-ACNC: NEGATIVE — SIGNIFICANT CHANGE UP
LEUKOCYTE ESTERASE UR-ACNC: NEGATIVE — SIGNIFICANT CHANGE UP
LIDOCAIN IGE QN: 31 U/L — SIGNIFICANT CHANGE UP (ref 7–60)
LYMPHOCYTES # BLD AUTO: 0.3 K/UL — LOW (ref 1–3.3)
LYMPHOCYTES # BLD AUTO: 1.7 % — LOW (ref 13–44)
LYMPHOCYTES # BLD AUTO: 1.75 K/UL — SIGNIFICANT CHANGE UP (ref 1–3.3)
LYMPHOCYTES # BLD AUTO: 16.4 % — SIGNIFICANT CHANGE UP (ref 13–44)
LYMPHOCYTES # BLD AUTO: 17.1 % — SIGNIFICANT CHANGE UP (ref 13–44)
LYMPHOCYTES # BLD AUTO: 2.14 K/UL — SIGNIFICANT CHANGE UP (ref 1–3.3)
MAGNESIUM SERPL-MCNC: 1.9 MG/DL — SIGNIFICANT CHANGE UP (ref 1.6–2.6)
MAGNESIUM SERPL-MCNC: 2.2 MG/DL — SIGNIFICANT CHANGE UP (ref 1.6–2.6)
MAGNESIUM SERPL-MCNC: 2.3 MG/DL — SIGNIFICANT CHANGE UP (ref 1.6–2.6)
MAGNESIUM SERPL-MCNC: 2.5 MG/DL — SIGNIFICANT CHANGE UP (ref 1.6–2.6)
MAGNESIUM SERPL-MCNC: 2.9 MG/DL — HIGH (ref 1.6–2.6)
MANUAL SMEAR VERIFICATION: SIGNIFICANT CHANGE UP
MCHC RBC-ENTMCNC: 29.9 PG — SIGNIFICANT CHANGE UP (ref 27–34)
MCHC RBC-ENTMCNC: 30.9 GM/DL — LOW (ref 32–36)
MCHC RBC-ENTMCNC: 30.9 PG — SIGNIFICANT CHANGE UP (ref 27–34)
MCHC RBC-ENTMCNC: 31 PG — SIGNIFICANT CHANGE UP (ref 27–34)
MCHC RBC-ENTMCNC: 31.1 PG — SIGNIFICANT CHANGE UP (ref 27–34)
MCHC RBC-ENTMCNC: 31.3 PG — SIGNIFICANT CHANGE UP (ref 27–34)
MCHC RBC-ENTMCNC: 31.3 PG — SIGNIFICANT CHANGE UP (ref 27–34)
MCHC RBC-ENTMCNC: 31.4 PG — SIGNIFICANT CHANGE UP (ref 27–34)
MCHC RBC-ENTMCNC: 31.5 PG — SIGNIFICANT CHANGE UP (ref 27–34)
MCHC RBC-ENTMCNC: 31.5 PG — SIGNIFICANT CHANGE UP (ref 27–34)
MCHC RBC-ENTMCNC: 31.9 GM/DL — LOW (ref 32–36)
MCHC RBC-ENTMCNC: 32.2 GM/DL — SIGNIFICANT CHANGE UP (ref 32–36)
MCHC RBC-ENTMCNC: 32.2 GM/DL — SIGNIFICANT CHANGE UP (ref 32–36)
MCHC RBC-ENTMCNC: 32.4 GM/DL — SIGNIFICANT CHANGE UP (ref 32–36)
MCHC RBC-ENTMCNC: 32.9 GM/DL — SIGNIFICANT CHANGE UP (ref 32–36)
MCHC RBC-ENTMCNC: 33 GM/DL — SIGNIFICANT CHANGE UP (ref 32–36)
MCHC RBC-ENTMCNC: 33 GM/DL — SIGNIFICANT CHANGE UP (ref 32–36)
MCHC RBC-ENTMCNC: 33.1 GM/DL — SIGNIFICANT CHANGE UP (ref 32–36)
MCV RBC AUTO: 94.4 FL — SIGNIFICANT CHANGE UP (ref 80–100)
MCV RBC AUTO: 94.6 FL — SIGNIFICANT CHANGE UP (ref 80–100)
MCV RBC AUTO: 94.6 FL — SIGNIFICANT CHANGE UP (ref 80–100)
MCV RBC AUTO: 95 FL — SIGNIFICANT CHANGE UP (ref 80–100)
MCV RBC AUTO: 95.1 FL — SIGNIFICANT CHANGE UP (ref 80–100)
MCV RBC AUTO: 95.8 FL — SIGNIFICANT CHANGE UP (ref 80–100)
MCV RBC AUTO: 96.1 FL — SIGNIFICANT CHANGE UP (ref 80–100)
MCV RBC AUTO: 96.1 FL — SIGNIFICANT CHANGE UP (ref 80–100)
MCV RBC AUTO: 96.4 FL — SIGNIFICANT CHANGE UP (ref 80–100)
MCV RBC AUTO: 96.7 FL — SIGNIFICANT CHANGE UP (ref 80–100)
MCV RBC AUTO: 98.8 FL — SIGNIFICANT CHANGE UP (ref 80–100)
METHOD TYPE: SIGNIFICANT CHANGE UP
MICROCYTES BLD QL: SLIGHT — SIGNIFICANT CHANGE UP
MONOCYTES # BLD AUTO: 0.79 K/UL — SIGNIFICANT CHANGE UP (ref 0–0.9)
MONOCYTES # BLD AUTO: 1.02 K/UL — HIGH (ref 0–0.9)
MONOCYTES # BLD AUTO: 1.07 K/UL — HIGH (ref 0–0.9)
MONOCYTES NFR BLD AUTO: 10 % — SIGNIFICANT CHANGE UP (ref 2–14)
MONOCYTES NFR BLD AUTO: 4.4 % — SIGNIFICANT CHANGE UP (ref 2–14)
MONOCYTES NFR BLD AUTO: 8.1 % — SIGNIFICANT CHANGE UP (ref 2–14)
MRSA PCR RESULT.: NEGATIVE — SIGNIFICANT CHANGE UP
NEUTROPHILS # BLD AUTO: 16.84 K/UL — HIGH (ref 1.8–7.4)
NEUTROPHILS # BLD AUTO: 7.51 K/UL — HIGH (ref 1.8–7.4)
NEUTROPHILS # BLD AUTO: 9.08 K/UL — HIGH (ref 1.8–7.4)
NEUTROPHILS NFR BLD AUTO: 70.4 % — SIGNIFICANT CHANGE UP (ref 43–77)
NEUTROPHILS NFR BLD AUTO: 72.4 % — SIGNIFICANT CHANGE UP (ref 43–77)
NEUTROPHILS NFR BLD AUTO: 92.1 % — HIGH (ref 43–77)
NEUTS BAND # BLD: 1.8 % — SIGNIFICANT CHANGE UP (ref 0–8)
NITRITE UR-MCNC: NEGATIVE — SIGNIFICANT CHANGE UP
NITRITE UR-MCNC: POSITIVE
NRBC # BLD: 0 /100 WBCS — SIGNIFICANT CHANGE UP (ref 0–0)
ORGANISM # SPEC MICROSCOPIC CNT: SIGNIFICANT CHANGE UP
ORGANISM # SPEC MICROSCOPIC CNT: SIGNIFICANT CHANGE UP
OSMOLALITY UR: 659 MOSM/KG — SIGNIFICANT CHANGE UP (ref 300–900)
OVALOCYTES BLD QL SMEAR: SLIGHT — SIGNIFICANT CHANGE UP
PCO2 BLDV: 47 MMHG — SIGNIFICANT CHANGE UP (ref 42–55)
PH BLDV: 7.4 — SIGNIFICANT CHANGE UP (ref 7.32–7.43)
PH UR: 6 — SIGNIFICANT CHANGE UP (ref 5–8)
PH UR: 7 — SIGNIFICANT CHANGE UP (ref 5–8)
PH UR: 7 — SIGNIFICANT CHANGE UP (ref 5–8)
PHOSPHATE SERPL-MCNC: 2.8 MG/DL — SIGNIFICANT CHANGE UP (ref 2.5–4.5)
PHOSPHATE SERPL-MCNC: 2.8 MG/DL — SIGNIFICANT CHANGE UP (ref 2.5–4.5)
PHOSPHATE SERPL-MCNC: 3.1 MG/DL — SIGNIFICANT CHANGE UP (ref 2.5–4.5)
PHOSPHATE SERPL-MCNC: 3.2 MG/DL — SIGNIFICANT CHANGE UP (ref 2.5–4.5)
PHOSPHATE SERPL-MCNC: 3.3 MG/DL — SIGNIFICANT CHANGE UP (ref 2.5–4.5)
PHOSPHATE SERPL-MCNC: 3.6 MG/DL
PHOSPHATE SERPL-MCNC: 3.8 MG/DL — SIGNIFICANT CHANGE UP (ref 2.5–4.5)
PLAT MORPH BLD: ABNORMAL
PLATELET # BLD AUTO: 264 K/UL — SIGNIFICANT CHANGE UP (ref 150–400)
PLATELET # BLD AUTO: 268 K/UL — SIGNIFICANT CHANGE UP (ref 150–400)
PLATELET # BLD AUTO: 277 K/UL — SIGNIFICANT CHANGE UP (ref 150–400)
PLATELET # BLD AUTO: 299 K/UL — SIGNIFICANT CHANGE UP (ref 150–400)
PLATELET # BLD AUTO: 308 K/UL — SIGNIFICANT CHANGE UP (ref 150–400)
PLATELET # BLD AUTO: 312 K/UL — SIGNIFICANT CHANGE UP (ref 150–400)
PLATELET # BLD AUTO: 326 K/UL — SIGNIFICANT CHANGE UP (ref 150–400)
PLATELET # BLD AUTO: 334 K/UL — SIGNIFICANT CHANGE UP (ref 150–400)
PLATELET # BLD AUTO: 351 K/UL — SIGNIFICANT CHANGE UP (ref 150–400)
PLATELET # BLD AUTO: 362 K/UL — SIGNIFICANT CHANGE UP (ref 150–400)
PLATELET # BLD AUTO: 483 K/UL — HIGH (ref 150–400)
PO2 BLDV: 35 MMHG — SIGNIFICANT CHANGE UP (ref 25–45)
POIKILOCYTOSIS BLD QL AUTO: SLIGHT — SIGNIFICANT CHANGE UP
POLYCHROMASIA BLD QL SMEAR: SLIGHT — SIGNIFICANT CHANGE UP
POTASSIUM BLDV-SCNC: 4.9 MMOL/L — SIGNIFICANT CHANGE UP (ref 3.5–5.1)
POTASSIUM SERPL-MCNC: 3.5 MMOL/L — SIGNIFICANT CHANGE UP (ref 3.5–5.3)
POTASSIUM SERPL-MCNC: 3.6 MMOL/L — SIGNIFICANT CHANGE UP (ref 3.5–5.3)
POTASSIUM SERPL-MCNC: 4 MMOL/L — SIGNIFICANT CHANGE UP (ref 3.5–5.3)
POTASSIUM SERPL-MCNC: 4.2 MMOL/L — SIGNIFICANT CHANGE UP (ref 3.5–5.3)
POTASSIUM SERPL-MCNC: 4.8 MMOL/L — SIGNIFICANT CHANGE UP (ref 3.5–5.3)
POTASSIUM SERPL-MCNC: 4.9 MMOL/L — SIGNIFICANT CHANGE UP (ref 3.5–5.3)
POTASSIUM SERPL-MCNC: 5 MMOL/L — SIGNIFICANT CHANGE UP (ref 3.5–5.3)
POTASSIUM SERPL-SCNC: 3.5 MMOL/L — SIGNIFICANT CHANGE UP (ref 3.5–5.3)
POTASSIUM SERPL-SCNC: 3.6 MMOL/L — SIGNIFICANT CHANGE UP (ref 3.5–5.3)
POTASSIUM SERPL-SCNC: 4 MMOL/L — SIGNIFICANT CHANGE UP (ref 3.5–5.3)
POTASSIUM SERPL-SCNC: 4.2 MMOL/L — SIGNIFICANT CHANGE UP (ref 3.5–5.3)
POTASSIUM SERPL-SCNC: 4.8 MMOL/L
POTASSIUM SERPL-SCNC: 4.8 MMOL/L — SIGNIFICANT CHANGE UP (ref 3.5–5.3)
POTASSIUM SERPL-SCNC: 4.9 MMOL/L — SIGNIFICANT CHANGE UP (ref 3.5–5.3)
POTASSIUM SERPL-SCNC: 5 MMOL/L — SIGNIFICANT CHANGE UP (ref 3.5–5.3)
PROCALCITONIN SERPL-MCNC: 1.35 NG/ML — HIGH (ref 0.02–0.1)
PROT SERPL-MCNC: 6.4 G/DL — SIGNIFICANT CHANGE UP (ref 6–8.3)
PROT SERPL-MCNC: 6.8 G/DL — SIGNIFICANT CHANGE UP (ref 6–8.3)
PROT SERPL-MCNC: 7.1 G/DL — SIGNIFICANT CHANGE UP (ref 6–8.3)
PROT SERPL-MCNC: 7.2 G/DL
PROT SERPL-MCNC: 7.7 G/DL — SIGNIFICANT CHANGE UP (ref 6–8.3)
PROT UR-MCNC: 100 MG/DL
PROT UR-MCNC: 30 MG/DL
PROT UR-MCNC: 30 MG/DL
PROT UR-MCNC: NEGATIVE MG/DL — SIGNIFICANT CHANGE UP
PROT UR-MCNC: NEGATIVE MG/DL — SIGNIFICANT CHANGE UP
PROTHROM AB SERPL-ACNC: 12.3 SEC — SIGNIFICANT CHANGE UP (ref 10.5–13.4)
PROTHROM AB SERPL-ACNC: 13 SEC — SIGNIFICANT CHANGE UP (ref 10.5–13.4)
RAPID RVP RESULT: SIGNIFICANT CHANGE UP
RBC # BLD: 2.71 M/UL — LOW (ref 4.2–5.8)
RBC # BLD: 3.08 M/UL — LOW (ref 4.2–5.8)
RBC # BLD: 3.21 M/UL — LOW (ref 4.2–5.8)
RBC # BLD: 3.25 M/UL — LOW (ref 4.2–5.8)
RBC # BLD: 3.29 M/UL — LOW (ref 4.2–5.8)
RBC # BLD: 3.32 M/UL — LOW (ref 4.2–5.8)
RBC # BLD: 3.34 M/UL — LOW (ref 4.2–5.8)
RBC # BLD: 3.54 M/UL — LOW (ref 4.2–5.8)
RBC # BLD: 3.56 M/UL — LOW (ref 4.2–5.8)
RBC # BLD: 3.63 M/UL — LOW (ref 4.2–5.8)
RBC # BLD: 3.8 M/UL — LOW (ref 4.2–5.8)
RBC # FLD: 12.9 % — SIGNIFICANT CHANGE UP (ref 10.3–14.5)
RBC # FLD: 13.1 % — SIGNIFICANT CHANGE UP (ref 10.3–14.5)
RBC # FLD: 13.2 % — SIGNIFICANT CHANGE UP (ref 10.3–14.5)
RBC # FLD: 13.5 % — SIGNIFICANT CHANGE UP (ref 10.3–14.5)
RBC # FLD: 13.5 % — SIGNIFICANT CHANGE UP (ref 10.3–14.5)
RBC # FLD: 13.7 % — SIGNIFICANT CHANGE UP (ref 10.3–14.5)
RBC # FLD: 13.9 % — SIGNIFICANT CHANGE UP (ref 10.3–14.5)
RBC # FLD: 14 % — SIGNIFICANT CHANGE UP (ref 10.3–14.5)
RBC # FLD: 19.3 % — HIGH (ref 10.3–14.5)
RBC BLD AUTO: ABNORMAL
RBC CASTS # UR COMP ASSIST: > 10 /HPF
RBC CASTS # UR COMP ASSIST: ABNORMAL /HPF
RH IG SCN BLD-IMP: POSITIVE — SIGNIFICANT CHANGE UP
RH IG SCN BLD-IMP: POSITIVE — SIGNIFICANT CHANGE UP
S AUREUS DNA NOSE QL NAA+PROBE: NEGATIVE — SIGNIFICANT CHANGE UP
SAO2 % BLDV: SIGNIFICANT CHANGE UP % (ref 67–88)
SARS-COV-2 RNA SPEC QL NAA+PROBE: NEGATIVE — SIGNIFICANT CHANGE UP
SARS-COV-2 RNA SPEC QL NAA+PROBE: SIGNIFICANT CHANGE UP
SODIUM SERPL-SCNC: 137 MMOL/L — SIGNIFICANT CHANGE UP (ref 135–145)
SODIUM SERPL-SCNC: 138 MMOL/L — SIGNIFICANT CHANGE UP (ref 135–145)
SODIUM SERPL-SCNC: 139 MMOL/L — SIGNIFICANT CHANGE UP (ref 135–145)
SODIUM SERPL-SCNC: 140 MMOL/L — SIGNIFICANT CHANGE UP (ref 135–145)
SODIUM SERPL-SCNC: 142 MMOL/L
SODIUM SERPL-SCNC: 142 MMOL/L — SIGNIFICANT CHANGE UP (ref 135–145)
SODIUM SERPL-SCNC: 142 MMOL/L — SIGNIFICANT CHANGE UP (ref 135–145)
SODIUM SERPL-SCNC: 143 MMOL/L — SIGNIFICANT CHANGE UP (ref 135–145)
SODIUM SERPL-SCNC: 144 MMOL/L — SIGNIFICANT CHANGE UP (ref 135–145)
SODIUM SERPL-SCNC: 145 MMOL/L — SIGNIFICANT CHANGE UP (ref 135–145)
SODIUM UR-SCNC: 114 MMOL/L — SIGNIFICANT CHANGE UP
SODIUM UR-SCNC: 66 MMOL/L — SIGNIFICANT CHANGE UP
SP GR SPEC: 1.01 — SIGNIFICANT CHANGE UP (ref 1–1.03)
SP GR SPEC: 1.02 — SIGNIFICANT CHANGE UP (ref 1–1.03)
SP GR SPEC: >=1.03 — SIGNIFICANT CHANGE UP (ref 1–1.03)
SPECIMEN SOURCE: SIGNIFICANT CHANGE UP
TROPONIN T SERPL-MCNC: 0.01 NG/ML — SIGNIFICANT CHANGE UP (ref 0–0.01)
TROPONIN T SERPL-MCNC: 0.01 NG/ML — SIGNIFICANT CHANGE UP (ref 0–0.01)
TSH SERPL-MCNC: 2.53 UIU/ML — SIGNIFICANT CHANGE UP (ref 0.27–4.2)
TSH SERPL-MCNC: 3.23 UIU/ML — SIGNIFICANT CHANGE UP (ref 0.27–4.2)
UROBILINOGEN FLD QL: 0.2 E.U./DL — SIGNIFICANT CHANGE UP
UUN UR-MCNC: 910 MG/DL — SIGNIFICANT CHANGE UP
WBC # BLD: 10.26 K/UL — SIGNIFICANT CHANGE UP (ref 3.8–10.5)
WBC # BLD: 10.67 K/UL — HIGH (ref 3.8–10.5)
WBC # BLD: 11.52 K/UL — HIGH (ref 3.8–10.5)
WBC # BLD: 12.55 K/UL — HIGH (ref 3.8–10.5)
WBC # BLD: 12.75 K/UL — HIGH (ref 3.8–10.5)
WBC # BLD: 12.78 K/UL — HIGH (ref 3.8–10.5)
WBC # BLD: 12.95 K/UL — HIGH (ref 3.8–10.5)
WBC # BLD: 17.93 K/UL — HIGH (ref 3.8–10.5)
WBC # BLD: 21.03 K/UL — HIGH (ref 3.8–10.5)
WBC # BLD: 22.62 K/UL — HIGH (ref 3.8–10.5)
WBC # BLD: 7.45 K/UL — SIGNIFICANT CHANGE UP (ref 3.8–10.5)
WBC # FLD AUTO: 10.26 K/UL — SIGNIFICANT CHANGE UP (ref 3.8–10.5)
WBC # FLD AUTO: 10.67 K/UL — HIGH (ref 3.8–10.5)
WBC # FLD AUTO: 11.52 K/UL — HIGH (ref 3.8–10.5)
WBC # FLD AUTO: 12.55 K/UL — HIGH (ref 3.8–10.5)
WBC # FLD AUTO: 12.75 K/UL — HIGH (ref 3.8–10.5)
WBC # FLD AUTO: 12.78 K/UL — HIGH (ref 3.8–10.5)
WBC # FLD AUTO: 12.95 K/UL — HIGH (ref 3.8–10.5)
WBC # FLD AUTO: 17.93 K/UL — HIGH (ref 3.8–10.5)
WBC # FLD AUTO: 21.03 K/UL — HIGH (ref 3.8–10.5)
WBC # FLD AUTO: 22.62 K/UL — HIGH (ref 3.8–10.5)
WBC # FLD AUTO: 7.45 K/UL — SIGNIFICANT CHANGE UP (ref 3.8–10.5)
WBC UR QL: < 5 /HPF — SIGNIFICANT CHANGE UP
WBC UR QL: ABNORMAL /HPF

## 2022-01-01 PROCEDURE — 99285 EMERGENCY DEPT VISIT HI MDM: CPT | Mod: 25

## 2022-01-01 PROCEDURE — 83605 ASSAY OF LACTIC ACID: CPT

## 2022-01-01 PROCEDURE — 99232 SBSQ HOSP IP/OBS MODERATE 35: CPT | Mod: GC

## 2022-01-01 PROCEDURE — 99233 SBSQ HOSP IP/OBS HIGH 50: CPT | Mod: GC

## 2022-01-01 PROCEDURE — 99214 OFFICE O/P EST MOD 30 MIN: CPT

## 2022-01-01 PROCEDURE — 83735 ASSAY OF MAGNESIUM: CPT

## 2022-01-01 PROCEDURE — 85027 COMPLETE CBC AUTOMATED: CPT

## 2022-01-01 PROCEDURE — 97162 PT EVAL MOD COMPLEX 30 MIN: CPT

## 2022-01-01 PROCEDURE — 99223 1ST HOSP IP/OBS HIGH 75: CPT

## 2022-01-01 PROCEDURE — 99222 1ST HOSP IP/OBS MODERATE 55: CPT | Mod: GC

## 2022-01-01 PROCEDURE — 82803 BLOOD GASES ANY COMBINATION: CPT

## 2022-01-01 PROCEDURE — 85610 PROTHROMBIN TIME: CPT

## 2022-01-01 PROCEDURE — 99284 EMERGENCY DEPT VISIT MOD MDM: CPT | Mod: 25

## 2022-01-01 PROCEDURE — 99239 HOSP IP/OBS DSCHRG MGMT >30: CPT | Mod: GC

## 2022-01-01 PROCEDURE — 84100 ASSAY OF PHOSPHORUS: CPT

## 2022-01-01 PROCEDURE — 70450 CT HEAD/BRAIN W/O DYE: CPT | Mod: MA

## 2022-01-01 PROCEDURE — 80048 BASIC METABOLIC PNL TOTAL CA: CPT

## 2022-01-01 PROCEDURE — 97161 PT EVAL LOW COMPLEX 20 MIN: CPT

## 2022-01-01 PROCEDURE — 86850 RBC ANTIBODY SCREEN: CPT

## 2022-01-01 PROCEDURE — 85730 THROMBOPLASTIN TIME PARTIAL: CPT

## 2022-01-01 PROCEDURE — 87186 SC STD MICRODIL/AGAR DIL: CPT

## 2022-01-01 PROCEDURE — 82550 ASSAY OF CK (CPK): CPT

## 2022-01-01 PROCEDURE — 96365 THER/PROPH/DIAG IV INF INIT: CPT

## 2022-01-01 PROCEDURE — 84443 ASSAY THYROID STIM HORMONE: CPT

## 2022-01-01 PROCEDURE — 87640 STAPH A DNA AMP PROBE: CPT

## 2022-01-01 PROCEDURE — 84540 ASSAY OF URINE/UREA-N: CPT

## 2022-01-01 PROCEDURE — 71045 X-RAY EXAM CHEST 1 VIEW: CPT | Mod: 26

## 2022-01-01 PROCEDURE — 0225U NFCT DS DNA&RNA 21 SARSCOV2: CPT

## 2022-01-01 PROCEDURE — 84300 ASSAY OF URINE SODIUM: CPT

## 2022-01-01 PROCEDURE — 71045 X-RAY EXAM CHEST 1 VIEW: CPT | Mod: 26,77

## 2022-01-01 PROCEDURE — 83690 ASSAY OF LIPASE: CPT

## 2022-01-01 PROCEDURE — 87635 SARS-COV-2 COVID-19 AMP PRB: CPT

## 2022-01-01 PROCEDURE — 84484 ASSAY OF TROPONIN QUANT: CPT

## 2022-01-01 PROCEDURE — 82570 ASSAY OF URINE CREATININE: CPT

## 2022-01-01 PROCEDURE — 84295 ASSAY OF SERUM SODIUM: CPT

## 2022-01-01 PROCEDURE — 99497 ADVNCD CARE PLAN 30 MIN: CPT | Mod: 25

## 2022-01-01 PROCEDURE — 96376 TX/PRO/DX INJ SAME DRUG ADON: CPT

## 2022-01-01 PROCEDURE — 70450 CT HEAD/BRAIN W/O DYE: CPT | Mod: 26,MA

## 2022-01-01 PROCEDURE — 87040 BLOOD CULTURE FOR BACTERIA: CPT

## 2022-01-01 PROCEDURE — 82962 GLUCOSE BLOOD TEST: CPT

## 2022-01-01 PROCEDURE — 92526 ORAL FUNCTION THERAPY: CPT

## 2022-01-01 PROCEDURE — 87641 MR-STAPH DNA AMP PROBE: CPT

## 2022-01-01 PROCEDURE — 86900 BLOOD TYPING SEROLOGIC ABO: CPT

## 2022-01-01 PROCEDURE — 84145 PROCALCITONIN (PCT): CPT

## 2022-01-01 PROCEDURE — 82330 ASSAY OF CALCIUM: CPT

## 2022-01-01 PROCEDURE — 97116 GAIT TRAINING THERAPY: CPT

## 2022-01-01 PROCEDURE — 97110 THERAPEUTIC EXERCISES: CPT

## 2022-01-01 PROCEDURE — 96375 TX/PRO/DX INJ NEW DRUG ADDON: CPT

## 2022-01-01 PROCEDURE — 81001 URINALYSIS AUTO W/SCOPE: CPT

## 2022-01-01 PROCEDURE — 76775 US EXAM ABDO BACK WALL LIM: CPT

## 2022-01-01 PROCEDURE — 83935 ASSAY OF URINE OSMOLALITY: CPT

## 2022-01-01 PROCEDURE — 99282 EMERGENCY DEPT VISIT SF MDM: CPT

## 2022-01-01 PROCEDURE — 90662 IIV NO PRSV INCREASED AG IM: CPT

## 2022-01-01 PROCEDURE — 93010 ELECTROCARDIOGRAM REPORT: CPT

## 2022-01-01 PROCEDURE — 85025 COMPLETE CBC W/AUTO DIFF WBC: CPT

## 2022-01-01 PROCEDURE — 80053 COMPREHEN METABOLIC PANEL: CPT

## 2022-01-01 PROCEDURE — 97530 THERAPEUTIC ACTIVITIES: CPT

## 2022-01-01 PROCEDURE — 36415 COLL VENOUS BLD VENIPUNCTURE: CPT

## 2022-01-01 PROCEDURE — 74177 CT ABD & PELVIS W/CONTRAST: CPT | Mod: MA

## 2022-01-01 PROCEDURE — 99213 OFFICE O/P EST LOW 20 MIN: CPT

## 2022-01-01 PROCEDURE — 71045 X-RAY EXAM CHEST 1 VIEW: CPT

## 2022-01-01 PROCEDURE — 84132 ASSAY OF SERUM POTASSIUM: CPT

## 2022-01-01 PROCEDURE — 74177 CT ABD & PELVIS W/CONTRAST: CPT | Mod: 26,MA

## 2022-01-01 PROCEDURE — 99285 EMERGENCY DEPT VISIT HI MDM: CPT

## 2022-01-01 PROCEDURE — 86901 BLOOD TYPING SEROLOGIC RH(D): CPT

## 2022-01-01 PROCEDURE — 76775 US EXAM ABDO BACK WALL LIM: CPT | Mod: 26

## 2022-01-01 PROCEDURE — 92610 EVALUATE SWALLOWING FUNCTION: CPT

## 2022-01-01 PROCEDURE — 82553 CREATINE MB FRACTION: CPT

## 2022-01-01 PROCEDURE — 87086 URINE CULTURE/COLONY COUNT: CPT

## 2022-01-01 RX ORDER — ZINC SULFATE TAB 220 MG (50 MG ZINC EQUIVALENT) 220 (50 ZN) MG
220 TAB ORAL EVERY 24 HOURS
Refills: 0 | Status: DISCONTINUED | OUTPATIENT
Start: 2022-01-01 | End: 2022-01-01

## 2022-01-01 RX ORDER — PIPERACILLIN AND TAZOBACTAM 4; .5 G/20ML; G/20ML
3.38 INJECTION, POWDER, LYOPHILIZED, FOR SOLUTION INTRAVENOUS ONCE
Refills: 0 | Status: COMPLETED | OUTPATIENT
Start: 2022-01-01 | End: 2022-01-01

## 2022-01-01 RX ORDER — ACETAMINOPHEN 500 MG
1000 TABLET ORAL ONCE
Refills: 0 | Status: COMPLETED | OUTPATIENT
Start: 2022-01-01 | End: 2022-01-01

## 2022-01-01 RX ORDER — SULFAMETHOXAZOLE AND TRIMETHOPRIM 800; 160 MG/1; MG/1
800-160 TABLET ORAL TWICE DAILY
Qty: 6 | Refills: 2 | Status: ACTIVE | COMMUNITY
Start: 2022-01-01 | End: 1900-01-01

## 2022-01-01 RX ORDER — SODIUM CHLORIDE 9 MG/ML
1000 INJECTION INTRAMUSCULAR; INTRAVENOUS; SUBCUTANEOUS
Refills: 0 | Status: DISCONTINUED | OUTPATIENT
Start: 2022-01-01 | End: 2022-01-01

## 2022-01-01 RX ORDER — METRONIDAZOLE 500 MG
500 TABLET ORAL THREE TIMES A DAY
Refills: 0 | Status: COMPLETED | OUTPATIENT
Start: 2022-01-01 | End: 2022-01-01

## 2022-01-01 RX ORDER — VANCOMYCIN HCL 1 G
1000 VIAL (EA) INTRAVENOUS EVERY 24 HOURS
Refills: 0 | Status: DISCONTINUED | OUTPATIENT
Start: 2022-01-01 | End: 2022-01-01

## 2022-01-01 RX ORDER — SERTRALINE 25 MG/1
25 TABLET, FILM COATED ORAL
Qty: 90 | Refills: 1 | Status: DISCONTINUED | COMMUNITY
Start: 2021-10-15 | End: 2022-01-01

## 2022-01-01 RX ORDER — ACETAMINOPHEN 500 MG
975 TABLET ORAL ONCE
Refills: 0 | Status: DISCONTINUED | OUTPATIENT
Start: 2022-01-01 | End: 2022-01-01

## 2022-01-01 RX ORDER — SENNA PLUS 8.6 MG/1
2 TABLET ORAL AT BEDTIME
Refills: 0 | Status: DISCONTINUED | OUTPATIENT
Start: 2022-01-01 | End: 2022-01-01

## 2022-01-01 RX ORDER — MIRTAZAPINE 7.5 MG/1
7.5 TABLET, FILM COATED ORAL
Qty: 30 | Refills: 5 | Status: DISCONTINUED | COMMUNITY
Start: 2022-01-01 | End: 2022-01-01

## 2022-01-01 RX ORDER — SODIUM CHLORIDE 9 MG/ML
500 INJECTION INTRAMUSCULAR; INTRAVENOUS; SUBCUTANEOUS ONCE
Refills: 0 | Status: COMPLETED | OUTPATIENT
Start: 2022-01-01 | End: 2022-01-01

## 2022-01-01 RX ORDER — ACETAMINOPHEN 500 MG
650 TABLET ORAL EVERY 6 HOURS
Refills: 0 | Status: DISCONTINUED | OUTPATIENT
Start: 2022-01-01 | End: 2022-01-01

## 2022-01-01 RX ORDER — TAMSULOSIN HYDROCHLORIDE 0.4 MG/1
0.4 CAPSULE ORAL AT BEDTIME
Refills: 0 | Status: DISCONTINUED | OUTPATIENT
Start: 2022-01-01 | End: 2022-01-01

## 2022-01-01 RX ORDER — ZINC SULFATE TAB 220 MG (50 MG ZINC EQUIVALENT) 220 (50 ZN) MG
1 TAB ORAL
Qty: 0 | Refills: 0 | DISCHARGE
Start: 2022-01-01

## 2022-01-01 RX ORDER — CEFPODOXIME PROXETIL 100 MG
200 TABLET ORAL EVERY 12 HOURS
Refills: 0 | Status: COMPLETED | OUTPATIENT
Start: 2022-01-01 | End: 2022-01-01

## 2022-01-01 RX ORDER — DIATRIZOATE MEGLUMINE 180 MG/ML
30 INJECTION, SOLUTION INTRAVESICAL ONCE
Refills: 0 | Status: COMPLETED | OUTPATIENT
Start: 2022-01-01 | End: 2022-01-01

## 2022-01-01 RX ORDER — ASCORBIC ACID 60 MG
1 TABLET,CHEWABLE ORAL
Qty: 0 | Refills: 0 | DISCHARGE
Start: 2022-01-01

## 2022-01-01 RX ORDER — MORPHINE SULFATE 50 MG/1
2 CAPSULE, EXTENDED RELEASE ORAL ONCE
Refills: 0 | Status: DISCONTINUED | OUTPATIENT
Start: 2022-01-01 | End: 2022-01-01

## 2022-01-01 RX ORDER — SODIUM CHLORIDE 9 MG/ML
1000 INJECTION, SOLUTION INTRAVENOUS
Refills: 0 | Status: DISCONTINUED | OUTPATIENT
Start: 2022-01-01 | End: 2022-01-01

## 2022-01-01 RX ORDER — SODIUM CHLORIDE 9 MG/ML
1000 INJECTION INTRAMUSCULAR; INTRAVENOUS; SUBCUTANEOUS ONCE
Refills: 0 | Status: COMPLETED | OUTPATIENT
Start: 2022-01-01 | End: 2022-01-01

## 2022-01-01 RX ORDER — ASPIRIN 81 MG/1
81 TABLET ORAL DAILY
Qty: 60 | Refills: 0 | Status: DISCONTINUED | COMMUNITY
Start: 2021-06-22 | End: 2022-01-01

## 2022-01-01 RX ORDER — OLANZAPINE 15 MG/1
5 TABLET, FILM COATED ORAL ONCE
Refills: 0 | Status: COMPLETED | OUTPATIENT
Start: 2022-01-01 | End: 2022-01-01

## 2022-01-01 RX ORDER — ASCORBIC ACID 60 MG
500 TABLET,CHEWABLE ORAL EVERY 24 HOURS
Refills: 0 | Status: DISCONTINUED | OUTPATIENT
Start: 2022-01-01 | End: 2022-01-01

## 2022-01-01 RX ORDER — LACTULOSE 10 G/15ML
10 SOLUTION ORAL
Refills: 0 | Status: DISCONTINUED | OUTPATIENT
Start: 2022-01-01 | End: 2022-01-01

## 2022-01-01 RX ORDER — INFLUENZA VIRUS VACCINE 15; 15; 15; 15 UG/.5ML; UG/.5ML; UG/.5ML; UG/.5ML
0.7 SUSPENSION INTRAMUSCULAR ONCE
Refills: 0 | Status: COMPLETED | OUTPATIENT
Start: 2022-01-01 | End: 2022-01-01

## 2022-01-01 RX ORDER — LIDOCAINE 4 G/100G
1 CREAM TOPICAL ONCE
Refills: 0 | Status: COMPLETED | OUTPATIENT
Start: 2022-01-01 | End: 2022-01-01

## 2022-01-01 RX ORDER — POLYETHYLENE GLYCOL 3350 17 G/17G
17 POWDER, FOR SOLUTION ORAL DAILY
Refills: 0 | Status: DISCONTINUED | OUTPATIENT
Start: 2022-01-01 | End: 2022-01-01

## 2022-01-01 RX ORDER — PIPERACILLIN AND TAZOBACTAM 4; .5 G/20ML; G/20ML
3.38 INJECTION, POWDER, LYOPHILIZED, FOR SOLUTION INTRAVENOUS EVERY 8 HOURS
Refills: 0 | Status: DISCONTINUED | OUTPATIENT
Start: 2022-01-01 | End: 2022-01-01

## 2022-01-01 RX ORDER — HEPARIN SODIUM 5000 [USP'U]/ML
5000 INJECTION INTRAVENOUS; SUBCUTANEOUS EVERY 8 HOURS
Refills: 0 | Status: DISCONTINUED | OUTPATIENT
Start: 2022-01-01 | End: 2022-01-01

## 2022-01-01 RX ORDER — LANOLIN ALCOHOL/MO/W.PET/CERES
10 CREAM (GRAM) TOPICAL ONCE
Refills: 0 | Status: COMPLETED | OUTPATIENT
Start: 2022-01-01 | End: 2022-01-01

## 2022-01-01 RX ORDER — TAMSULOSIN HYDROCHLORIDE 0.4 MG/1
0.4 CAPSULE ORAL
Qty: 30 | Refills: 3 | Status: ACTIVE | COMMUNITY
Start: 2022-01-01 | End: 1900-01-01

## 2022-01-01 RX ORDER — QUETIAPINE FUMARATE 25 MG/1
25 TABLET ORAL
Qty: 30 | Refills: 1 | Status: DISCONTINUED | COMMUNITY
Start: 2021-10-18 | End: 2022-01-01

## 2022-01-01 RX ORDER — ASPIRIN/CALCIUM CARB/MAGNESIUM 324 MG
81 TABLET ORAL DAILY
Refills: 0 | Status: DISCONTINUED | OUTPATIENT
Start: 2022-01-01 | End: 2022-01-01

## 2022-01-01 RX ORDER — TRAZODONE HYDROCHLORIDE 50 MG/1
50 TABLET ORAL
Qty: 20 | Refills: 5 | Status: ACTIVE | COMMUNITY
Start: 2022-01-01 | End: 1900-01-01

## 2022-01-01 RX ORDER — ACETAMINOPHEN 500 MG
650 TABLET ORAL ONCE
Refills: 0 | Status: COMPLETED | OUTPATIENT
Start: 2022-01-01 | End: 2022-01-01

## 2022-01-01 RX ORDER — SODIUM CHLORIDE 9 MG/ML
1000 INJECTION, SOLUTION INTRAVENOUS ONCE
Refills: 0 | Status: DISCONTINUED | OUTPATIENT
Start: 2022-01-01 | End: 2022-01-01

## 2022-01-01 RX ORDER — POLYETHYLENE GLYCOL 3350 17 G/17G
17 POWDER, FOR SOLUTION ORAL
Refills: 0 | Status: DISCONTINUED | OUTPATIENT
Start: 2022-01-01 | End: 2022-01-01

## 2022-01-01 RX ORDER — ASCORBIC ACID 60 MG
500 TABLET,CHEWABLE ORAL DAILY
Refills: 0 | Status: DISCONTINUED | OUTPATIENT
Start: 2022-01-01 | End: 2022-01-01

## 2022-01-01 RX ORDER — POLYETHYLENE GLYCOL 3350 17 G/17G
17 POWDER, FOR SOLUTION ORAL
Qty: 510 | Refills: 1
Start: 2022-01-01 | End: 2022-01-01

## 2022-01-01 RX ORDER — POTASSIUM CHLORIDE 20 MEQ
40 PACKET (EA) ORAL ONCE
Refills: 0 | Status: COMPLETED | OUTPATIENT
Start: 2022-01-01 | End: 2022-01-01

## 2022-01-01 RX ORDER — GABAPENTIN 100 MG/1
100 CAPSULE ORAL
Qty: 30 | Refills: 1 | Status: DISCONTINUED | COMMUNITY
Start: 2022-01-01 | End: 2022-01-01

## 2022-01-01 RX ADMIN — HEPARIN SODIUM 5000 UNIT(S): 5000 INJECTION INTRAVENOUS; SUBCUTANEOUS at 19:30

## 2022-01-01 RX ADMIN — Medication 500 MILLIGRAM(S): at 18:21

## 2022-01-01 RX ADMIN — SODIUM CHLORIDE 1000 MILLILITER(S): 9 INJECTION INTRAMUSCULAR; INTRAVENOUS; SUBCUTANEOUS at 11:09

## 2022-01-01 RX ADMIN — HEPARIN SODIUM 5000 UNIT(S): 5000 INJECTION INTRAVENOUS; SUBCUTANEOUS at 16:05

## 2022-01-01 RX ADMIN — ZINC SULFATE TAB 220 MG (50 MG ZINC EQUIVALENT) 220 MILLIGRAM(S): 220 (50 ZN) TAB at 16:20

## 2022-01-01 RX ADMIN — ZINC SULFATE TAB 220 MG (50 MG ZINC EQUIVALENT) 220 MILLIGRAM(S): 220 (50 ZN) TAB at 17:58

## 2022-01-01 RX ADMIN — Medication 81 MILLIGRAM(S): at 11:34

## 2022-01-01 RX ADMIN — SENNA PLUS 2 TABLET(S): 8.6 TABLET ORAL at 23:32

## 2022-01-01 RX ADMIN — POLYETHYLENE GLYCOL 3350 17 GRAM(S): 17 POWDER, FOR SOLUTION ORAL at 17:34

## 2022-01-01 RX ADMIN — PIPERACILLIN AND TAZOBACTAM 25 GRAM(S): 4; .5 INJECTION, POWDER, LYOPHILIZED, FOR SOLUTION INTRAVENOUS at 05:33

## 2022-01-01 RX ADMIN — Medication 81 MILLIGRAM(S): at 12:41

## 2022-01-01 RX ADMIN — HEPARIN SODIUM 5000 UNIT(S): 5000 INJECTION INTRAVENOUS; SUBCUTANEOUS at 06:36

## 2022-01-01 RX ADMIN — PIPERACILLIN AND TAZOBACTAM 25 GRAM(S): 4; .5 INJECTION, POWDER, LYOPHILIZED, FOR SOLUTION INTRAVENOUS at 06:18

## 2022-01-01 RX ADMIN — Medication 500 MILLIGRAM(S): at 16:43

## 2022-01-01 RX ADMIN — HEPARIN SODIUM 5000 UNIT(S): 5000 INJECTION INTRAVENOUS; SUBCUTANEOUS at 15:52

## 2022-01-01 RX ADMIN — TAMSULOSIN HYDROCHLORIDE 0.4 MILLIGRAM(S): 0.4 CAPSULE ORAL at 23:29

## 2022-01-01 RX ADMIN — Medication 500 MILLIGRAM(S): at 17:58

## 2022-01-01 RX ADMIN — Medication 81 MILLIGRAM(S): at 16:21

## 2022-01-01 RX ADMIN — Medication 81 MILLIGRAM(S): at 12:18

## 2022-01-01 RX ADMIN — HEPARIN SODIUM 5000 UNIT(S): 5000 INJECTION INTRAVENOUS; SUBCUTANEOUS at 13:37

## 2022-01-01 RX ADMIN — PIPERACILLIN AND TAZOBACTAM 200 GRAM(S): 4; .5 INJECTION, POWDER, LYOPHILIZED, FOR SOLUTION INTRAVENOUS at 09:47

## 2022-01-01 RX ADMIN — MORPHINE SULFATE 2 MILLIGRAM(S): 50 CAPSULE, EXTENDED RELEASE ORAL at 00:29

## 2022-01-01 RX ADMIN — HEPARIN SODIUM 5000 UNIT(S): 5000 INJECTION INTRAVENOUS; SUBCUTANEOUS at 12:26

## 2022-01-01 RX ADMIN — SODIUM CHLORIDE 500 MILLILITER(S): 9 INJECTION INTRAMUSCULAR; INTRAVENOUS; SUBCUTANEOUS at 23:23

## 2022-01-01 RX ADMIN — SODIUM CHLORIDE 90 MILLILITER(S): 9 INJECTION, SOLUTION INTRAVENOUS at 11:52

## 2022-01-01 RX ADMIN — HEPARIN SODIUM 5000 UNIT(S): 5000 INJECTION INTRAVENOUS; SUBCUTANEOUS at 13:17

## 2022-01-01 RX ADMIN — HEPARIN SODIUM 5000 UNIT(S): 5000 INJECTION INTRAVENOUS; SUBCUTANEOUS at 03:25

## 2022-01-01 RX ADMIN — SODIUM CHLORIDE 70 MILLILITER(S): 9 INJECTION, SOLUTION INTRAVENOUS at 16:36

## 2022-01-01 RX ADMIN — SENNA PLUS 2 TABLET(S): 8.6 TABLET ORAL at 23:29

## 2022-01-01 RX ADMIN — HEPARIN SODIUM 5000 UNIT(S): 5000 INJECTION INTRAVENOUS; SUBCUTANEOUS at 05:32

## 2022-01-01 RX ADMIN — HEPARIN SODIUM 5000 UNIT(S): 5000 INJECTION INTRAVENOUS; SUBCUTANEOUS at 15:19

## 2022-01-01 RX ADMIN — HEPARIN SODIUM 5000 UNIT(S): 5000 INJECTION INTRAVENOUS; SUBCUTANEOUS at 22:21

## 2022-01-01 RX ADMIN — SODIUM CHLORIDE 1000 MILLILITER(S): 9 INJECTION INTRAMUSCULAR; INTRAVENOUS; SUBCUTANEOUS at 00:44

## 2022-01-01 RX ADMIN — Medication 1000 MILLIGRAM(S): at 00:44

## 2022-01-01 RX ADMIN — Medication 10 MILLIGRAM(S): at 01:09

## 2022-01-01 RX ADMIN — HEPARIN SODIUM 5000 UNIT(S): 5000 INJECTION INTRAVENOUS; SUBCUTANEOUS at 14:39

## 2022-01-01 RX ADMIN — SODIUM CHLORIDE 1000 MILLILITER(S): 9 INJECTION INTRAMUSCULAR; INTRAVENOUS; SUBCUTANEOUS at 16:46

## 2022-01-01 RX ADMIN — TAMSULOSIN HYDROCHLORIDE 0.4 MILLIGRAM(S): 0.4 CAPSULE ORAL at 21:57

## 2022-01-01 RX ADMIN — Medication 500 MILLIGRAM(S): at 17:33

## 2022-01-01 RX ADMIN — Medication 1 ENEMA: at 02:44

## 2022-01-01 RX ADMIN — HEPARIN SODIUM 5000 UNIT(S): 5000 INJECTION INTRAVENOUS; SUBCUTANEOUS at 06:00

## 2022-01-01 RX ADMIN — PIPERACILLIN AND TAZOBACTAM 25 GRAM(S): 4; .5 INJECTION, POWDER, LYOPHILIZED, FOR SOLUTION INTRAVENOUS at 22:08

## 2022-01-01 RX ADMIN — PIPERACILLIN AND TAZOBACTAM 25 GRAM(S): 4; .5 INJECTION, POWDER, LYOPHILIZED, FOR SOLUTION INTRAVENOUS at 22:22

## 2022-01-01 RX ADMIN — POLYETHYLENE GLYCOL 3350 17 GRAM(S): 17 POWDER, FOR SOLUTION ORAL at 11:15

## 2022-01-01 RX ADMIN — Medication 40 MILLIEQUIVALENT(S): at 15:19

## 2022-01-01 RX ADMIN — Medication 1000 MILLIGRAM(S): at 14:00

## 2022-01-01 RX ADMIN — SENNA PLUS 2 TABLET(S): 8.6 TABLET ORAL at 22:20

## 2022-01-01 RX ADMIN — PIPERACILLIN AND TAZOBACTAM 25 GRAM(S): 4; .5 INJECTION, POWDER, LYOPHILIZED, FOR SOLUTION INTRAVENOUS at 12:15

## 2022-01-01 RX ADMIN — TAMSULOSIN HYDROCHLORIDE 0.4 MILLIGRAM(S): 0.4 CAPSULE ORAL at 21:43

## 2022-01-01 RX ADMIN — Medication 400 MILLIGRAM(S): at 14:43

## 2022-01-01 RX ADMIN — HEPARIN SODIUM 5000 UNIT(S): 5000 INJECTION INTRAVENOUS; SUBCUTANEOUS at 07:06

## 2022-01-01 RX ADMIN — HEPARIN SODIUM 5000 UNIT(S): 5000 INJECTION INTRAVENOUS; SUBCUTANEOUS at 14:09

## 2022-01-01 RX ADMIN — TAMSULOSIN HYDROCHLORIDE 0.4 MILLIGRAM(S): 0.4 CAPSULE ORAL at 23:32

## 2022-01-01 RX ADMIN — Medication 650 MILLIGRAM(S): at 05:27

## 2022-01-01 RX ADMIN — HEPARIN SODIUM 5000 UNIT(S): 5000 INJECTION INTRAVENOUS; SUBCUTANEOUS at 05:12

## 2022-01-01 RX ADMIN — Medication 81 MILLIGRAM(S): at 11:12

## 2022-01-01 RX ADMIN — HEPARIN SODIUM 5000 UNIT(S): 5000 INJECTION INTRAVENOUS; SUBCUTANEOUS at 16:21

## 2022-01-01 RX ADMIN — Medication 400 MILLIGRAM(S): at 14:17

## 2022-01-01 RX ADMIN — HEPARIN SODIUM 5000 UNIT(S): 5000 INJECTION INTRAVENOUS; SUBCUTANEOUS at 06:21

## 2022-01-01 RX ADMIN — TAMSULOSIN HYDROCHLORIDE 0.4 MILLIGRAM(S): 0.4 CAPSULE ORAL at 23:19

## 2022-01-01 RX ADMIN — SODIUM CHLORIDE 1000 MILLILITER(S): 9 INJECTION INTRAMUSCULAR; INTRAVENOUS; SUBCUTANEOUS at 09:46

## 2022-01-01 RX ADMIN — HEPARIN SODIUM 5000 UNIT(S): 5000 INJECTION INTRAVENOUS; SUBCUTANEOUS at 06:31

## 2022-01-01 RX ADMIN — HEPARIN SODIUM 5000 UNIT(S): 5000 INJECTION INTRAVENOUS; SUBCUTANEOUS at 23:30

## 2022-01-01 RX ADMIN — HEPARIN SODIUM 5000 UNIT(S): 5000 INJECTION INTRAVENOUS; SUBCUTANEOUS at 22:07

## 2022-01-01 RX ADMIN — Medication 500 MILLIGRAM(S): at 06:21

## 2022-01-01 RX ADMIN — SODIUM CHLORIDE 1000 MILLILITER(S): 9 INJECTION INTRAMUSCULAR; INTRAVENOUS; SUBCUTANEOUS at 23:11

## 2022-01-01 RX ADMIN — PIPERACILLIN AND TAZOBACTAM 25 GRAM(S): 4; .5 INJECTION, POWDER, LYOPHILIZED, FOR SOLUTION INTRAVENOUS at 14:35

## 2022-01-01 RX ADMIN — PIPERACILLIN AND TAZOBACTAM 25 GRAM(S): 4; .5 INJECTION, POWDER, LYOPHILIZED, FOR SOLUTION INTRAVENOUS at 14:18

## 2022-01-01 RX ADMIN — PIPERACILLIN AND TAZOBACTAM 25 GRAM(S): 4; .5 INJECTION, POWDER, LYOPHILIZED, FOR SOLUTION INTRAVENOUS at 22:37

## 2022-01-01 RX ADMIN — HEPARIN SODIUM 5000 UNIT(S): 5000 INJECTION INTRAVENOUS; SUBCUTANEOUS at 01:14

## 2022-01-01 RX ADMIN — PIPERACILLIN AND TAZOBACTAM 25 GRAM(S): 4; .5 INJECTION, POWDER, LYOPHILIZED, FOR SOLUTION INTRAVENOUS at 07:10

## 2022-01-01 RX ADMIN — Medication 1 TABLET(S): at 16:20

## 2022-01-01 RX ADMIN — TAMSULOSIN HYDROCHLORIDE 0.4 MILLIGRAM(S): 0.4 CAPSULE ORAL at 22:03

## 2022-01-01 RX ADMIN — Medication 81 MILLIGRAM(S): at 12:08

## 2022-01-01 RX ADMIN — Medication 1 TABLET(S): at 12:09

## 2022-01-01 RX ADMIN — HEPARIN SODIUM 5000 UNIT(S): 5000 INJECTION INTRAVENOUS; SUBCUTANEOUS at 23:11

## 2022-01-01 RX ADMIN — Medication 500 MILLIGRAM(S): at 16:20

## 2022-01-01 RX ADMIN — HEPARIN SODIUM 5000 UNIT(S): 5000 INJECTION INTRAVENOUS; SUBCUTANEOUS at 15:32

## 2022-01-01 RX ADMIN — Medication 250 MILLIGRAM(S): at 09:47

## 2022-01-01 RX ADMIN — MORPHINE SULFATE 2 MILLIGRAM(S): 50 CAPSULE, EXTENDED RELEASE ORAL at 23:39

## 2022-01-01 RX ADMIN — HEPARIN SODIUM 5000 UNIT(S): 5000 INJECTION INTRAVENOUS; SUBCUTANEOUS at 06:10

## 2022-01-01 RX ADMIN — HEPARIN SODIUM 5000 UNIT(S): 5000 INJECTION INTRAVENOUS; SUBCUTANEOUS at 22:24

## 2022-01-01 RX ADMIN — TAMSULOSIN HYDROCHLORIDE 0.4 MILLIGRAM(S): 0.4 CAPSULE ORAL at 22:21

## 2022-01-01 RX ADMIN — HEPARIN SODIUM 5000 UNIT(S): 5000 INJECTION INTRAVENOUS; SUBCUTANEOUS at 14:08

## 2022-01-01 RX ADMIN — POLYETHYLENE GLYCOL 3350 17 GRAM(S): 17 POWDER, FOR SOLUTION ORAL at 11:20

## 2022-01-01 RX ADMIN — HEPARIN SODIUM 5000 UNIT(S): 5000 INJECTION INTRAVENOUS; SUBCUTANEOUS at 22:03

## 2022-01-01 RX ADMIN — PIPERACILLIN AND TAZOBACTAM 25 GRAM(S): 4; .5 INJECTION, POWDER, LYOPHILIZED, FOR SOLUTION INTRAVENOUS at 14:21

## 2022-01-01 RX ADMIN — Medication 500 MILLIGRAM(S): at 21:43

## 2022-01-01 RX ADMIN — MORPHINE SULFATE 2 MILLIGRAM(S): 50 CAPSULE, EXTENDED RELEASE ORAL at 00:44

## 2022-01-01 RX ADMIN — Medication 200 MILLIGRAM(S): at 06:21

## 2022-01-01 RX ADMIN — HEPARIN SODIUM 5000 UNIT(S): 5000 INJECTION INTRAVENOUS; SUBCUTANEOUS at 06:20

## 2022-01-01 RX ADMIN — ZINC SULFATE TAB 220 MG (50 MG ZINC EQUIVALENT) 220 MILLIGRAM(S): 220 (50 ZN) TAB at 16:43

## 2022-01-01 RX ADMIN — HEPARIN SODIUM 5000 UNIT(S): 5000 INJECTION INTRAVENOUS; SUBCUTANEOUS at 06:19

## 2022-01-01 RX ADMIN — LIDOCAINE 1 APPLICATION(S): 4 CREAM TOPICAL at 00:29

## 2022-01-01 RX ADMIN — Medication 500 MILLIGRAM(S): at 16:02

## 2022-01-01 RX ADMIN — SODIUM CHLORIDE 70 MILLILITER(S): 9 INJECTION, SOLUTION INTRAVENOUS at 15:12

## 2022-01-01 RX ADMIN — SODIUM CHLORIDE 500 MILLILITER(S): 9 INJECTION INTRAMUSCULAR; INTRAVENOUS; SUBCUTANEOUS at 21:48

## 2022-01-01 RX ADMIN — POLYETHYLENE GLYCOL 3350 17 GRAM(S): 17 POWDER, FOR SOLUTION ORAL at 16:21

## 2022-01-01 RX ADMIN — Medication 200 MILLIGRAM(S): at 18:21

## 2022-01-01 RX ADMIN — Medication 1000 MILLIGRAM(S): at 15:15

## 2022-01-01 RX ADMIN — SODIUM CHLORIDE 50 MILLILITER(S): 9 INJECTION INTRAMUSCULAR; INTRAVENOUS; SUBCUTANEOUS at 14:39

## 2022-01-01 RX ADMIN — Medication 81 MILLIGRAM(S): at 11:15

## 2022-01-01 RX ADMIN — SODIUM CHLORIDE 70 MILLILITER(S): 9 INJECTION, SOLUTION INTRAVENOUS at 11:10

## 2022-01-01 RX ADMIN — POLYETHYLENE GLYCOL 3350 17 GRAM(S): 17 POWDER, FOR SOLUTION ORAL at 11:12

## 2022-01-01 RX ADMIN — TAMSULOSIN HYDROCHLORIDE 0.4 MILLIGRAM(S): 0.4 CAPSULE ORAL at 23:11

## 2022-01-01 RX ADMIN — HEPARIN SODIUM 5000 UNIT(S): 5000 INJECTION INTRAVENOUS; SUBCUTANEOUS at 06:32

## 2022-01-01 RX ADMIN — TAMSULOSIN HYDROCHLORIDE 0.4 MILLIGRAM(S): 0.4 CAPSULE ORAL at 22:25

## 2022-01-01 RX ADMIN — INFLUENZA VIRUS VACCINE 0.7 MILLILITER(S): 15; 15; 15; 15 SUSPENSION INTRAMUSCULAR at 16:33

## 2022-01-01 RX ADMIN — HEPARIN SODIUM 5000 UNIT(S): 5000 INJECTION INTRAVENOUS; SUBCUTANEOUS at 11:09

## 2022-01-01 RX ADMIN — Medication 400 MILLIGRAM(S): at 23:11

## 2022-01-01 RX ADMIN — DIATRIZOATE MEGLUMINE 30 MILLILITER(S): 180 INJECTION, SOLUTION INTRAVESICAL at 22:39

## 2022-01-01 RX ADMIN — HEPARIN SODIUM 5000 UNIT(S): 5000 INJECTION INTRAVENOUS; SUBCUTANEOUS at 22:25

## 2022-01-01 RX ADMIN — POLYETHYLENE GLYCOL 3350 17 GRAM(S): 17 POWDER, FOR SOLUTION ORAL at 07:06

## 2022-01-01 RX ADMIN — SENNA PLUS 2 TABLET(S): 8.6 TABLET ORAL at 23:11

## 2022-01-01 RX ADMIN — HEPARIN SODIUM 5000 UNIT(S): 5000 INJECTION INTRAVENOUS; SUBCUTANEOUS at 23:32

## 2022-01-01 RX ADMIN — ZINC SULFATE TAB 220 MG (50 MG ZINC EQUIVALENT) 220 MILLIGRAM(S): 220 (50 ZN) TAB at 17:34

## 2022-01-01 RX ADMIN — POLYETHYLENE GLYCOL 3350 17 GRAM(S): 17 POWDER, FOR SOLUTION ORAL at 06:20

## 2022-01-01 RX ADMIN — Medication 81 MILLIGRAM(S): at 11:20

## 2022-01-01 RX ADMIN — HEPARIN SODIUM 5000 UNIT(S): 5000 INJECTION INTRAVENOUS; SUBCUTANEOUS at 19:41

## 2022-01-01 RX ADMIN — HEPARIN SODIUM 5000 UNIT(S): 5000 INJECTION INTRAVENOUS; SUBCUTANEOUS at 22:20

## 2022-01-01 RX ADMIN — PIPERACILLIN AND TAZOBACTAM 25 GRAM(S): 4; .5 INJECTION, POWDER, LYOPHILIZED, FOR SOLUTION INTRAVENOUS at 22:23

## 2022-01-01 RX ADMIN — OLANZAPINE 5 MILLIGRAM(S): 15 TABLET, FILM COATED ORAL at 22:20

## 2022-01-01 RX ADMIN — Medication 1 TABLET(S): at 12:42

## 2022-01-07 ENCOUNTER — TRANSCRIPTION ENCOUNTER (OUTPATIENT)
Age: 87
End: 2022-01-07

## 2022-01-28 PROBLEM — L29.9 SKIN PRURITUS: Status: ACTIVE | Noted: 2022-01-01

## 2022-01-28 NOTE — PLAN
[FreeTextEntry1] : abd pain - nothing on exam today\par  likely gas- ressurance given- if this reoccurs plan for imaging\par  - trial of simethicone\par \par Pruritus - check cmp, phos levels today

## 2022-01-28 NOTE — HISTORY OF PRESENT ILLNESS
[Spouse] : spouse [de-identified] : 88 yo M with dementia, colon ca s/p resection \par \par \par here with wife-  he often sits and presses on his left side. She pressed and it hurts - comes and goes.  He is a poor historian.  no diarrahe, no blood in stool.  \par \par Having a lot of itching\par \par

## 2022-03-05 NOTE — ED ADULT NURSE NOTE - NSIMPLEMENTINTERV_GEN_ALL_ED
Implemented All Fall with Harm Risk Interventions:  [place] to call system. Call bell, personal items and telephone within reach. Instruct patient to call for assistance. Room bathroom lighting operational. Non-slip footwear when patient is off stretcher. Physically safe environment: no spills, clutter or unnecessary equipment. Stretcher in lowest position, wheels locked, appropriate side rails in place. Provide visual cue, wrist band, yellow gown, etc. Monitor gait and stability. Monitor for mental status changes and reorient to person, place, and time. Review medications for side effects contributing to fall risk. Reinforce activity limits and safety measures with patient and family. Provide visual clues: red socks.

## 2022-03-05 NOTE — ED ADULT TRIAGE NOTE - CHIEF COMPLAINT QUOTE
Per wife "I noticed his right big toe is red and swollen. He also has a very painful rash on the left side by his ribs, it might be shingles."

## 2022-03-05 NOTE — ED ADULT NURSE NOTE - OBJECTIVE STATEMENT
Patient a+o x2 hx dementia, wife c/o "red right big toe and rash on left side of ribs" noticed today. Patient denies pain/cp/sob/dizziness/ha, speaking in full sentences without difficulty. Skin noted to be dry and cool. During PE, wife reports "the toe looks normal now but it was red before", patient denies pain during palpation of abdomen/flanks, no redness noted on flanks. Safety measures initiated, comfort measures rendered. Patient a+o x2 hx dementia, wife c/o "red right big toe and rash on left side of ribs" noticed today. Patient denies pain/cp/sob/dizziness/ha, speaking in full sentences without difficulty. Skin noted to be dry and cool. During PE, wife reports "the toe looks normal now but it was red before", patient denies pain during palpation of abdomen/flanks/toes, no redness noted on flanks. Safety measures initiated, comfort measures rendered.

## 2022-03-05 NOTE — ED ADULT NURSE NOTE - NSFALLRSKINDICTYPE_ED_ALL_ED
Chief Complaint   Patient presents with   • Coronary Artery Disease     follow up having some shortness of breath when he bends down and then back up x 2 months       Subjective     Orlando Arreguin is a 71 y.o. male who presents today for initial visit in follow-up of coronary artery disease characterized by left main to LAD PCI Dr. ORR in 2015 and RCA PCI for ACS 2020 Dr. Weinberg.  Since that time is developed dyspnea on exertion that is new and chest discomfort after eating.  Has history of polio as a child and cannot ambulate long distances or participating in the treadmill test.  Taking his medications as directed.    Past Medical History:   Diagnosis Date   • Coronary artery disease due to calcified coronary lesion 8/11/2015   • Diabetes mellitus type II    • Gastroparesis    • Hyperlipidemia    • Hypothyroid    • Interstitial cystitis    • Sciatic neuritis      Past Surgical History:   Procedure Laterality Date   • ZZZ CARDIAC CATH  8/6/15    YEISKA to LAD.  Has RCA 70% occulsion.   • APPENDECTOMY     • OTHER      L shoulder surgery (arthroscopic, [place], 2011)   • PB TRANSURETHRAL ELEC-SURG PBOSTATECTOM       Family History   Problem Relation Age of Onset   • Heart Disease Mother    • Heart Attack Mother 55        heart attack     Social History     Socioeconomic History   • Marital status: Single     Spouse name: Not on file   • Number of children: Not on file   • Years of education: Not on file   • Highest education level: Not on file   Occupational History   • Not on file   Tobacco Use   • Smoking status: Current Every Day Smoker     Packs/day: 1.00     Years: 30.00     Pack years: 30.00     Types: Cigarettes   • Smokeless tobacco: Never Used   Vaping Use   • Vaping Use: Never used   Substance and Sexual Activity   • Alcohol use: No     Alcohol/week: 0.0 oz   • Drug use: No   • Sexual activity: Not on file   Other Topics Concern   • Not on file   Social History Narrative   • Not on file     Social Determinants of  Health     Financial Resource Strain:    • Difficulty of Paying Living Expenses:    Food Insecurity:    • Worried About Running Out of Food in the Last Year:    • Ran Out of Food in the Last Year:    Transportation Needs:    • Lack of Transportation (Medical):    • Lack of Transportation (Non-Medical):    Physical Activity:    • Days of Exercise per Week:    • Minutes of Exercise per Session:    Stress:    • Feeling of Stress :    Social Connections:    • Frequency of Communication with Friends and Family:    • Frequency of Social Gatherings with Friends and Family:    • Attends Yazidi Services:    • Active Member of Clubs or Organizations:    • Attends Club or Organization Meetings:    • Marital Status:    Intimate Partner Violence:    • Fear of Current or Ex-Partner:    • Emotionally Abused:    • Physically Abused:    • Sexually Abused:      Allergies   Allergen Reactions   • Brilinta [Ticagrelor] Shortness of Breath     Total Sleep Apnea per patient; SOB   • Pcn [Penicillins] Anaphylaxis   • Other Environmental      Hayfever   • Tdap [Dipth, Acell Pertus, Tetanus]      tdap   • Tetanus Toxoid      Outpatient Encounter Medications as of 9/9/2021   Medication Sig Dispense Refill   • LIOTHYRONINE SODIUM PO Take 12.5 mcg by mouth 2 times a day.     • EUTHYROX 112 MCG Tab TAKE 1 TABLET BY MOUTH IN THE MORNING ON AN EMPTY STOMACH 90 Tablet 0   • ALPRAZolam (XANAX) 0.25 MG Tab TAKE 1 TABLET BY MOUTH AT BEDTIME AS NEEDED FOR SLEEP FOR  UP  TO  30  DAYS 30 Tablet 0   • metformin (GLUCOPHAGE) 1000 MG tablet Take 1 tablet by mouth 2 times a day with meals. 180 tablet 3   • lisinopril (PRINIVIL) 2.5 MG Tab Take 1 tablet by mouth once daily 90 tablet 3   • LANTUS SOLOSTAR 100 UNIT/ML Solution Pen-injector injection INJECT 12 UNITS SUBCUTANEOUSLY ONCE DAILY IN THE EVENING AS  INSTRUCTED 15 mL 3   • atorvastatin (LIPITOR) 80 MG tablet Take 1 Tab by mouth every day. 90 Tab 3   • prasugrel (EFFIENT) 10 MG Tab Take 1 Tab by  "mouth every day. 90 Tab 3   • aspirin EC 81 MG EC tablet Take 1 Tab by mouth every day. 30 Tab 0   • Cholecalciferol (VITAMIN D3 PO) Take 1 Dose by mouth every day. Unknown OTC Strength.     • erythromycin base (UMM-TAB) 250 MG Tab Take 250 mg by mouth every day. Maintenance Medication.     • Riboflavin (VITAMIN B-2 PO) Take 1 Dose by mouth every day. Unknown OTC Strength.     • RELION PRIME TEST strip USE 1 STRIP TO CHECK GLUCOSE ONCE DAILY BEFORE A MEAL 100 Strip 11   • ReliOn Ultra Thin Lancets 30G Misc USE LANCET TO CHECK BLOOD SUGAR ONCE DAILY 100 Each 11   • RELION PEN NEEDLES 31G X 6 MM Misc USE PEN ONCE DAILY WITH LANTUS  Each 11   • Insulin Pen Needle 31G X 6 MM Misc        No facility-administered encounter medications on file as of 9/9/2021.     Review of Systems   All other systems reviewed and are negative.             Objective     /54 (BP Location: Left arm, Patient Position: Sitting)   Pulse 84   Ht 1.702 m (5' 7\")   Wt 70.3 kg (155 lb)   SpO2 98%   BMI 24.28 kg/m²     Physical Exam  Vitals reviewed.   Constitutional:       General: He is not in acute distress.     Appearance: He is well-developed. He is not diaphoretic.   HENT:      Head: Normocephalic and atraumatic.      Right Ear: External ear normal.      Left Ear: External ear normal.   Eyes:      General: No scleral icterus.        Right eye: No discharge.         Left eye: No discharge.      Conjunctiva/sclera: Conjunctivae normal.      Pupils: Pupils are equal, round, and reactive to light.   Neck:      Thyroid: No thyromegaly.      Vascular: No JVD.      Trachea: No tracheal deviation.   Cardiovascular:      Rate and Rhythm: Normal rate and regular rhythm.      Chest Wall: PMI is not displaced.      Pulses:           Carotid pulses are 2+ on the right side and 2+ on the left side.       Radial pulses are 2+ on the left side.        Popliteal pulses are 2+ on the right side and 2+ on the left side.        Dorsalis pedis " pulses are 2+ on the right side and 2+ on the left side.        Posterior tibial pulses are 2+ on the right side and 2+ on the left side.      Heart sounds: No murmur heard.   No friction rub. No gallop.    Pulmonary:      Effort: Pulmonary effort is normal. No respiratory distress.      Breath sounds: Normal breath sounds. No wheezing or rales.   Chest:      Chest wall: No tenderness.   Abdominal:      General: Bowel sounds are normal. There is no distension.      Palpations: Abdomen is soft.      Tenderness: There is no abdominal tenderness.   Musculoskeletal:         General: No tenderness or deformity. Normal range of motion.      Cervical back: Normal range of motion and neck supple.   Skin:     General: Skin is warm and dry.      Coloration: Skin is not pale.      Findings: No erythema or rash.   Neurological:      Mental Status: He is alert and oriented to person, place, and time.      Cranial Nerves: No cranial nerve deficit (cranial nerves II through XII grossly intact).      Coordination: Coordination normal.   Psychiatric:         Behavior: Behavior normal.         Thought Content: Thought content normal.       LABS:  Lab Results   Component Value Date/Time    CHOLSTRLTOT 75 (L) 09/02/2021 07:06 AM    CHOLSTRLTOT 72 (L) 12/07/2020 10:51 AM    LDL 36 12/07/2020 10:51 AM    HDL 27 (L) 09/02/2021 07:06 AM    HDL 23 (A) 12/07/2020 10:51 AM    TRIGLYCERIDE 43 09/02/2021 07:06 AM    TRIGLYCERIDE 65 12/07/2020 10:51 AM       Lab Results   Component Value Date/Time    WBC 5.6 06/08/2021 08:35 AM    WBC 6.0 12/07/2020 10:51 AM    RBC 5.09 06/08/2021 08:35 AM    RBC 4.94 12/07/2020 10:51 AM    HEMOGLOBIN 15.5 06/08/2021 08:35 AM    HEMOGLOBIN 14.3 12/07/2020 10:51 AM    HEMATOCRIT 46.0 06/08/2021 08:35 AM    HEMATOCRIT 44.7 12/07/2020 10:51 AM    MCV 90 06/08/2021 08:35 AM    MCV 90.5 12/07/2020 10:51 AM    NEUTSPOLYS 62 06/08/2021 08:35 AM    NEUTSPOLYS 60.80 12/07/2020 10:51 AM    LYMPHOCYTES 29 06/08/2021 08:35  AM    LYMPHOCYTES 28.00 12/07/2020 10:51 AM    MONOCYTES 7 06/08/2021 08:35 AM    MONOCYTES 8.50 12/07/2020 10:51 AM    EOSINOPHILS 1 06/08/2021 08:35 AM    EOSINOPHILS 1.70 12/07/2020 10:51 AM    BASOPHILS 1 06/08/2021 08:35 AM    BASOPHILS 0.70 12/07/2020 10:51 AM     Lab Results   Component Value Date/Time    SODIUM 140 09/02/2021 07:06 AM    SODIUM 142 12/07/2020 10:51 AM    POTASSIUM 4.6 09/02/2021 07:06 AM    POTASSIUM 4.8 12/07/2020 10:51 AM    CHLORIDE 106 09/02/2021 07:06 AM    CHLORIDE 109 12/07/2020 10:51 AM    CO2 21 09/02/2021 07:06 AM    CO2 23 12/07/2020 10:51 AM    GLUCOSE 140 (H) 09/02/2021 07:06 AM    GLUCOSE 113 (H) 12/07/2020 10:51 AM    BUN 15 09/02/2021 07:06 AM    BUN 18 12/07/2020 10:51 AM    CREATININE 0.92 09/02/2021 07:06 AM    CREATININE 0.94 12/07/2020 10:51 AM    BUNCREATRAT 16 09/02/2021 07:06 AM     Lab Results   Component Value Date    HBA1C 6.0 (H) 06/08/2021    HBA1C 5.9 (H) 12/07/2020      Lab Results   Component Value Date/Time    ALKPHOSPHAT 115 09/02/2021 07:06 AM    ALKPHOSPHAT 115 (H) 12/07/2020 10:51 AM    ASTSGOT 24 09/02/2021 07:06 AM    ASTSGOT 37 12/07/2020 10:51 AM    ALTSGPT 27 09/02/2021 07:06 AM    ALTSGPT 44 12/07/2020 10:51 AM    TBILIRUBIN 0.4 09/02/2021 07:06 AM    TBILIRUBIN 0.4 12/07/2020 10:51 AM      Lab Results   Component Value Date/Time    BNPBTYPENAT 178.8 (H) 08/19/2015 10:28 AM    BNPBTYPENAT 299 (H) 08/06/2015 04:59 AM      Lab Results   Component Value Date/Time    TSH 0.010 (L) 04/08/2019 10:25 AM     Lab Results   Component Value Date/Time    PROTHROMBTM 14.1 08/06/2015 04:59 AM    INR 1.09 08/06/2015 04:59 AM                   Assessment & Plan     1. Atypical chest pain     2. Coronary artery disease due to calcified coronary lesion  EC-ECHOCARDIOGRAM COMPLETE W/O CONT    NM-CARDIAC STRESS TEST   3. Tobacco abuse     4. Dyslipidemia     5. Stented coronary artery     6. Essential hypertension     7. BANG (dyspnea on exertion)  EC-ECHOCARDIOGRAM  COMPLETE W/O CONT    NM-CARDIAC STRESS TEST       Medical Decision Making: Today's Assessment/Status/Plan:     CAD with good lipid profile and blood pressure.  Good medical therapy.  He does note dyspnea on exertion especially after bending over and standing up for about 10 minutes as well as postprandial chest discomfort and palpitations.  This may be cardiac that may be noncardiac in nature.  He does have small vessel disease as well per previous angiography.  I recommend further evaluation with echocardiogram and pharmacologic MPI stress test.  Recommend continuing statin aspirin and Effient for the time being as well as other cardiac medications and follow-up after testing.                    Altered Elimination/Confusion/Need for Mobility Assisted Device

## 2022-03-05 NOTE — ED ADULT NURSE NOTE - NSICDXPASTMEDICALHX_GEN_ALL_CORE_FT
PAST MEDICAL HISTORY:  BPH (benign prostatic hyperplasia)     Brain TIA     Colon cancer     Sepsis 2/2 UTI- December 2013

## 2022-03-05 NOTE — ED PROVIDER NOTE - OBJECTIVE STATEMENT
87 M w ho dementia, colon ca, bph - wife was concerned about rash- to L flank- px had L sided discomfort for weeks- saw pcp 1 month ago for same- px at baseline in terms of eating/drinking- no f/c no n/v - anh po normally for him- which is diminished  no ha  no falls  rash to L flank- red- applied bacitracin- swelling to L great toe- applied ice

## 2022-03-05 NOTE — ED ADULT TRIAGE NOTE - PRO INTERPRETER NEED 2
Call to make appointment with either Dr High or Dr Matos at [facility] in one week  [phone]  Weight as tolerated with walker or crutches   English

## 2022-04-06 PROBLEM — R41.3 MEMORY LOSS: Status: ACTIVE | Noted: 2019-11-07

## 2022-04-14 PROBLEM — R41.89 COGNITIVE AND BEHAVIORAL CHANGES: Status: ACTIVE | Noted: 2022-01-01

## 2022-07-01 PROBLEM — R53.83 FATIGUE: Status: ACTIVE | Noted: 2021-06-22

## 2022-07-01 PROBLEM — R26.81 GAIT INSTABILITY: Status: ACTIVE | Noted: 2020-11-18

## 2022-07-01 NOTE — PLAN
[FreeTextEntry1] : diarrhea resolved\par  check labs lytes today\par \par trial of remeron to help inc appetite\par \par Dementia- significant - close monitoring with wife f/up with Dr Pagan\par \par gait instability  will try to get home PT set up

## 2022-07-01 NOTE — REVIEW OF SYSTEMS
[Recent Change In Weight] : ~T recent weight change [Negative] : Cardiovascular [de-identified] : poor gait and memory

## 2022-07-01 NOTE — PHYSICAL EXAM
[Normal] : soft, non-tender, non-distended, no masses palpated, no HSM and normal bowel sounds [de-identified] : more frail [de-identified] : unstable gait [de-identified] : shot responses

## 2022-07-01 NOTE — HISTORY OF PRESENT ILLNESS
[Spouse] : spouse [de-identified] : 87 yo M with dementia, colon ca s/p resection \par diarrhea = began yesterday - severe - no blood -watery.  no fever or chills.   given anti diarrhea pills - loperamid= helped a little. \par mental status has declined.  \par has no short term memory. \par for past month very little po intake.  \par CBD gummies have been helpful to mellow him \par weight of 155 has lost 10 lbs over the month\par \par

## 2022-07-17 NOTE — ED ADULT NURSE NOTE - OBJECTIVE STATEMENT
Pt presented to ED with c/o of constipation for aprox 5 days accompanied by abdominal discomfort and rectal pain. Affirms taking herbal remedy without relief. Patient denies chest pain, discomfort, shortness of breath, difficulty breathing and any form of distress not noted. Patient oriented to ED area. All needs attended. Rounding in progress. Fall risk precautions maintained.

## 2022-07-17 NOTE — ED ADULT TRIAGE NOTE - CHIEF COMPLAINT QUOTE
a&ox1 walked in w/ wife c.o constipation. LBM x 6 days ago. c.o of abdominal pain.  pmh of dementia.

## 2022-07-18 NOTE — ED PROVIDER NOTE - PROGRESS NOTE DETAILS
wife rports now pt uanble to urinate.   bedside US showing  600-700cc in bladder CT w/o acute findings.   will give enema wbc count slightly elevated to 12.5  creatinine 1.6 - no old for comparison  labs otherwise ok.   pending CT imaging  pt has required morphine x2 for pain control - abd pain and pain at rectum. had no relief w tylenol. also given topical for rectal pain. now pt resting comfortably given enema w some relief - having some passage of soft / liquid stool.   planned for discharge at this time. given no acute findings on imaging and s/p enema but wife now notes that she forgot that pt has had difficulty urinating. initially noted he had not urinated in 24 hours but does believe hes intermittently passed small amounts. believes his pain may be bladder related.   bedside US showing  600-700cc in bladder  will place hammer.   given ongoing constipation, now urinary retention (presumed from constipation / BPH) and creatinine slightly elevated - although none for comparison, will admit for continued observation and management.   may need PT eval / possible evaluation for HHA / services as wife very concerned about taking pt home w  hammer

## 2022-07-18 NOTE — H&P ADULT - PROBLEM SELECTOR PLAN 6
F-1L  E-replete PRN  N-NPO pending bedsphagia   DVT-hep sq  GI-none F-1L  E-replete PRN  N-NPO pending dysphagia   DVT-hep sq  GI-none

## 2022-07-18 NOTE — PHYSICAL THERAPY INITIAL EVALUATION ADULT - ASSISTIVE DEVICE FOR TRANSFER: STAND/SIT, REHAB EVAL
Patient dressed, IV removed, waiting for Dr. Pastrana to speak with pt prior to discharge.    rolling walker

## 2022-07-18 NOTE — ED PROVIDER NOTE - PHYSICAL EXAMINATION
Constitutional : Well appearing, non-toxic, no acute distress. intermittently rocking with pain. awake, A&Ox1 (person)  Head : head normocephalic, atraumatic  EENMT : eyes clear bilaterally, PERRL, EOMI. airway patent. dry mucous membranes. neck supple.  Cardiac : Normal rate, regular rhythm. No murmur appreciated, no LE edema.  Resp : Breath sounds clear and equal bilaterally. Respirations even and unlabored.   Gastro : abdomen soft, nontender, nondistended. no rebound or guarding. no CVAT.  MSK :  range of motion is not limited, no muscle or joint tenderness  Back : No evidence of trauma. No spinal or CVA tenderness.  Vasc : Extremities warm and well perfused. 2+ radial and DP pulses. cap refill <2 seconds  Neuro : Alert and oriented to person, CNII-XII grossly intact, no focal deficits, no motor or sensory deficits.  Skin : Skin normal color for race, warm, dry and intact. No evidence of rash.

## 2022-07-18 NOTE — ED PROVIDER NOTE - NS ED ATTENDING STATEMENT MOD
This was a shared visit with the YAYO. I reviewed and verified the documentation and independently performed the documented:

## 2022-07-18 NOTE — PHYSICAL THERAPY INITIAL EVALUATION ADULT - PERTINENT HX OF CURRENT PROBLEM, REHAB EVAL
88M not on any medical therapy who presented to the ED with wife complaining of constipation for 1 week associated with abd pain. As per wife, patient has also not urinated since yesterday AM. As per wife, in the last month, has not been eating and drinking as much as normally, which has worsened in the last week.

## 2022-07-18 NOTE — H&P ADULT - PROBLEM SELECTOR PLAN 5
Presenting with Hg 11. Unsure of baseline. No s/s bleeding currently.  -will ctm, transfuse < 7, maintain active t&s

## 2022-07-18 NOTE — H&P ADULT - NSHPPHYSICALEXAM_GEN_ALL_CORE
Phone Number Called: [phone]    Call outcome: Spoke to [facility]    Message: Verbal Order given to Pharmacy to change to Ointment.     .  VITAL SIGNS:  T(C): 36.6 (07-18-22 @ 08:46), Max: 36.8 (07-17-22 @ 19:26)  T(F): 97.8 (07-18-22 @ 08:46), Max: 98.3 (07-17-22 @ 19:26)  HR: 81 (07-18-22 @ 08:46) (70 - 88)  BP: 115/64 (07-18-22 @ 08:46) (112/68 - 135/81)  BP(mean): 81 (07-18-22 @ 08:46) (81 - 81)  RR: 19 (07-18-22 @ 08:46) (16 - 19)  SpO2: 96% (07-18-22 @ 08:46) (96% - 98%)  Wt(kg): --    PHYSICAL EXAM:    Constitutional: WDWN resting comfortably in bed; NAD  Head: NC/AT  Eyes: PERRL, EOMI, clear conjunctiva  ENT: no nasal discharge; uvula midline, no oropharyngeal erythema or exudates; MMM  Neck: supple; no JVD or thyromegaly  Respiratory: CTA B/L; no W/R/R, no retractions  Cardiac: +S1/S2; RRR; no M/R/G; PMI non-displaced  Gastrointestinal: soft, NT/ND; no rebound or guarding; +BSx4  Genitourinary: normal external genitalia  Back: spine midline, no bony tenderness or step-offs; no CVAT B/L  Extremities: WWP, no clubbing or cyanosis; no peripheral edema  Musculoskeletal: NROM x4; no joint swelling, tenderness or erythema  Vascular: 2+ radial, femoral, DP/PT pulses B/L  Dermatologic: skin warm, dry and intact; no rashes, wounds, or scars  Lymphatic: no submandibular or cervical LAD  Neurologic: AAOx3; CNII-XII grossly intact; no focal deficits  Psychiatric: affect and characteristics of appearance, verbalizations, behaviors are appropriate .  VITAL SIGNS:  T(C): 36.6 (07-18-22 @ 08:46), Max: 36.8 (07-17-22 @ 19:26)  T(F): 97.8 (07-18-22 @ 08:46), Max: 98.3 (07-17-22 @ 19:26)  HR: 81 (07-18-22 @ 08:46) (70 - 88)  BP: 115/64 (07-18-22 @ 08:46) (112/68 - 135/81)  BP(mean): 81 (07-18-22 @ 08:46) (81 - 81)  RR: 19 (07-18-22 @ 08:46) (16 - 19)  SpO2: 96% (07-18-22 @ 08:46) (96% - 98%)  Wt(kg): --    PHYSICAL EXAM:    Constitutional: WDWN resting comfortably in bed; NAD  Head: NC/AT  Eyes: PERRL, EOMI, clear conjunctiva  ENT: no nasal discharge; uvula midline, no oropharyngeal erythema or exudates; mucous membrnaes dry  Neck: supple; no JVD or thyromegaly  Respiratory: CTA B/L; no W/R/R, no retractions  Cardiac: +S1/S2; RRR; no M/R/G; PMI non-displaced  Gastrointestinal: abd tenderness in bl low quadrants  Back: spine midline, no bony tenderness or step-offs; no CVAT B/L  Extremities: WWP, no clubbing or cyanosis; no peripheral edema  Musculoskeletal: NROM x4; no joint swelling, tenderness or erythema  Vascular: 2+ radial, femoral, DP/PT pulses B/L  Dermatologic: skin warm, dry and intact; no rashes, wounds, or scars  Lymphatic: no submandibular or cervical LAD  Neurologic: AAOx1

## 2022-07-18 NOTE — H&P ADULT - NSHPLABSRESULTS_GEN_ALL_CORE
.  LABS:                         11.9   12.55 )-----------( 334      ( 2022 22:23 )             36.1         140  |  103  |  34<H>  ----------------------------<  107<H>  4.8   |  24  |  1.60<H>    Ca    9.5      2022 22:23  Mg     2.3         TPro  7.7  /  Alb  4.3  /  TBili  0.4  /  DBili  x   /  AST  20  /  ALT  14  /  AlkPhos  60      PT/INR - ( 2022 22:23 )   PT: 12.3 sec;   INR: 1.03          PTT - ( 2022 22:23 )  PTT:32.6 sec  Urinalysis Basic - ( 2022 08:07 )    Color: Yellow / Appearance: Clear / S.010 / pH: x  Gluc: x / Ketone: Trace mg/dL  / Bili: Negative / Urobili: 0.2 E.U./dL   Blood: x / Protein: NEGATIVE mg/dL / Nitrite: NEGATIVE   Leuk Esterase: NEGATIVE / RBC: 5-10 /HPF / WBC < 5 /HPF   Sq Epi: x / Non Sq Epi: 0-5 /HPF / Bacteria: None /HPF      CARDIAC MARKERS ( 2022 22:23 )  x     / 0.01 ng/mL / x     / x     / x            Lactate, Blood: 1.5 mmol/L ( @ 22:23)      RADIOLOGY, EKG & ADDITIONAL TESTS: Reviewed.

## 2022-07-18 NOTE — ED PROVIDER NOTE - CLINICAL SUMMARY MEDICAL DECISION MAKING FREE TEXT BOX
pt w history of dementia - distant hx of colon ca s/p resection, BPH, here w constipation x1 week, passing flatus and small / hard bm today. no vomiting. diffuse abd discomfort. pt intermittently more agitated than baseline due to pain.   pt overall well appearing, vitals ok. is intermittently agitated due to pain. benign abdominal exam.   do not suspect obstruction given no vomiting and benign abdomen but given surgical hx and symptoms will get ctap to r/o obstruction  plan for labs, CTAP, UA  iv hydration - pt appears dry and decreased PO intake as of last two weeks.   analgesia prn.   can trial enema for relief of constipation after imaging.

## 2022-07-18 NOTE — ED PROVIDER NOTE - OBJECTIVE STATEMENT
88 yr old male, history of Dementia, Colon Ca (1996 s/p partial colectomy and chemo, no issues since), BPH, presents to the Emergency Department with constipation. Pt w dementia, wife at bedside. She reports constipation x 1 week. normally has BM every 1-2 days. now no BM x6 days. Is passing flatus - today passed very small amt hard stool. no dark / bloody stool. has complained of abdominal pain, seemingly all over. no n/v. no fevers.  Previously this has happened and she has given pt miralax which has subsequently caused extended periods of profuse liquid diarrhea so wife was hesitant to give him this again.  She also notes decreased PO intake x1-2 weeks. She attributes this to the Dementia but notes that it has significantly worsened as of late.

## 2022-07-18 NOTE — PHYSICAL THERAPY INITIAL EVALUATION ADULT - NAME OF CLINICIAN
CARDIOVASCULAR - ADULT    • Absence of cardiac arrhythmias or at baseline Progressing        DISCHARGE PLANNING    • Discharge to home or other facility with appropriate resources Progressing        PAIN - ADULT    • Verbalizes/displays adequate comfort le RYNE Duong

## 2022-07-18 NOTE — PATIENT PROFILE ADULT - FALL HARM RISK - HARM RISK INTERVENTIONS
Assistance with ambulation/Assistance OOB with selected safe patient handling equipment/Communicate Risk of Fall with Harm to all staff/Discuss with provider need for PT consult/Monitor for mental status changes/Monitor gait and stability/Move patient closer to nurses' station/Reinforce activity limits and safety measures with patient and family/Reorient to person, place and time as needed/Tailored Fall Risk Interventions/Toileting schedule using arm’s reach rule for commode and bathroom/Use of alarms - bed, chair and/or voice tab/Visual Cue: Yellow wristband and red socks/Bed in lowest position, wheels locked, appropriate side rails in place/Call bell, personal items and telephone in reach/Instruct patient to call for assistance before getting out of bed or chair/Non-slip footwear when patient is out of bed/[place] to call system/Physically safe environment - no spills, clutter or unnecessary equipment/Purposeful Proactive Rounding/Room/bathroom lighting operational, light cord in reach

## 2022-07-18 NOTE — H&P ADULT - PROBLEM SELECTOR PLAN 4
Likely reactive given constipation and abd pain. No s/s of infection, Xray clear, UA negative, no fever, all other vital signs stable.   -ctm off antibiotics

## 2022-07-18 NOTE — ED ADULT NURSE REASSESSMENT NOTE - NS ED NURSE REASSESS COMMENT FT1
Pt sleeping in stretcher during shift change, mittens removed and discontinued as they are not needed right now. Pt resting comfortably and denies any distress at this time. Report given to KENNA Nolasco.

## 2022-07-18 NOTE — H&P ADULT - PROBLEM SELECTOR PLAN 1
Presented with constipation for 1 week, s/p fleet enema in ED with pellet like stool. Imaging not concerning for obstruction.   -will start on bowel regimen Presented with constipation for 1 week, s/p fleet enema in ED with pellet like stool. Imaging not concerning for obstruction.   -will start on bowel regimen (miralax, senna, lactulose)  -follow TSH

## 2022-07-18 NOTE — H&P ADULT - ATTENDING COMMENTS
88-year-old male with a PMHx of colon cancer (s/p left colectomy in 2016), dementia, BPH and TIA who presents with abdominal pain, constipation and urinary retention.      Patient unable to provide any history due to baseline dementia.  All of history was obtained from the patient’s wife, Inna.  She states Mr. Bianchi has been “impacted” for about 5 days.  He then started to experience poor oral intake followed by severe abdominal pain on 7/17.  Coinciding with the onset abdominal pain, she noticed his urine output dramatically decreased.  She states he is incontinent at baseline and uses Depends at home.   Prior to this episode, the patient was having small bowel movements on a regular basis.  Patient has had bouts of constipation in the past that are always very responsive to Miralax but Inna did not give him Miralax this time because she states it caused him to have significant diarrhea for 15 hours last time.  Patient has had trouble urinating in the past but has never been on any medications outside of [facility].  States his mental status is at baseline.  He will become intermittently agitated and she uses CBD gummies to help calm him down.      In the ED:    -Vital Signs: T 98.3, HR 70, RR 16, /68, 02 96% RA   -CBC: WBC 12.55, Hgb 11.9, platelets 334   -RFP: Na 140, K 4.8, Cl 103, bicarb 24, BUN 34, Cr 1.60, glucose 107   -LFTs: within normal limits    -Lactate: 1.5,    -Troponin: 0.01   -Lipase: 31   -Bladder scan: 600-700cc of urine    -CT-A/P: study limited by motion artifact., no acute intra-abdominal pathology visualized   -Interventions: 1L IVFs, 1g IV Acetaminophen and 1 fleet enema      Physical Exam:    -Gen: NAD, resting in bed   -HEENT: EOMI, PERRL, no scleral icterus, no JVD, no bruits   -CV: normal S1 and S2, no murmurs appreciated    -Lung: CTABL, no wheezes or crackles, normal respiratory effort on RA   -Ab: soft, NT, ND, normal BS (exam after placement of hammer)   -Ext: no LE edema, no rashes   -Neuro: A&O x 1 (baseline), no focal deficits      Assessment: 88-year-old male with a PMHx of colon cancer (s/p left colectomy in 2016), dementia, BPH and TIA who presents with abdominal pain and constipation.     #Abdominal Pain   #Constipation   -likely functional in nature as there is no acute pathology on CT and no obvious metabolic derangements    -obtain TSH    -start bowel regimen: doc-senna BID scheduled and miralax daily scheduled     #DWIGHT   -likely multifactorial --> pre-renal from poor PO intake and post-renal from urinary retention    -s/p 1L IVFs in the ED   -obtain urine electrolytes and renal ultrasound     #Urinary Retention   -most likely related to constipation   -history of urinary retention in the past but has deferred treatment as per HIE   -bladder scan with 600-700cc urine in the ED, s/p straight cath   -obtain PVR, UA and renal ultrasound     -consider starting tamsulosin 0.4mg daily    -possible urology follow up upon discharge pending clinical course     #Leukocytosis   -no obvious source of infection as patient is afebrile and with negative CT-A/P   -obtain CXR and UA for basic infectious workup    -no indication to start antibiotics at this time      #History of TIA    -continue with home ASA 81mg daily    -not on statin therapy as an outpatient      #Miscellaneous   -continue with home Quetiapine 25mg qhs and Sertraline 25mg daily   -PT/OT evaluation

## 2022-07-18 NOTE — ED ADULT NURSE REASSESSMENT NOTE - NS ED NURSE REASSESS COMMENT FT1
FC inserted without difficulty aseptically. Pt has initial 500 cc urine output. Will cont to monitor.

## 2022-07-18 NOTE — H&P ADULT - PROBLEM SELECTOR PLAN 2
Unsure of baseline; however, presenting with elevated creatinine on admission. Hx of urinary retention, UA negative for infection. Could be 2/2 obstructive versus pre renal etiology.   -follow urine lytes  -avoid nephrotoxic agents

## 2022-07-18 NOTE — H&P ADULT - HISTORY OF PRESENT ILLNESS
Patient is an 86yo M with a PMHx of Colon Ca (dx 1996 w/short course of chemo and partial colectomy), BPH not on any medical therapy who presented to the ED with wife complaining of constipation for 1 week associated with abd pain. As per wife, patient has also not urinated since yesterday AM.     In the ED  VSS  Labs WBC 12.5, Hg 11.9, BUN 34 Cr 1.6, UA negative  Imaging CT AP Study limited by motion artifact.  No acute intra-abdominal pathology visualized.  Nodular thickening ofthe left adrenal gland.  No evidence of obstrction.   Course Tylenol, Morphine 2mg x 2, Fleet enema, 1L Nacl   Patient is an 84yo M with a PMHx of Dementia, Colon Ca (dx 1996 w/short course of chemo and partial colectomy), BPH not on any medical therapy who presented to the ED with wife complaining of constipation for 1 week associated with abd pain. As per wife, patient has also not urinated since yesterday AM. As per wife, in the last month, has not been eating and drinking as much as normally, which has worsened in the last week. Currently patient without any acute complaints, denying any SOB, cough, fever, urinary symptoms. Endorsing abd pain.     In the ED  VSS  Labs WBC 12.5, Hg 11.9, BUN 34 Cr 1.6, UA negative  Imaging CT AP Study limited by motion artifact.  No acute intra-abdominal pathology visualized.  Nodular thickening ofthe left adrenal gland.  No evidence of obstrction.   Course Tylenol, Morphine 2mg x 2, Fleet enema, 1L Nacl

## 2022-07-18 NOTE — PATIENT PROFILE ADULT - LAST TOBACCO USE (DD-MM-YY)
Routing to PCP as FYI or if any other information/advise needed for fall on 10/25    Home care orders given    She reports her BS has increased since Monday as high as 146.  She is only taking 1500 Metformin for her diabetes. Encouraged her to eat more Protein to aid in healing plenty of fluids as well.  She should call clinic immediately if she becomes feverish, or signs of infection from forehead wound (expained s/s)  She will try to upload a photos of her face and send through [facility].    She expresses no other symptoms and no changes from baseline.    Additional Information    Negative: Major bleeding (actively dripping or spurting) that can't be stopped    Negative: Bullet, knife or other serious penetrating wound    Negative: Difficulty breathing or choking    Negative: Knocked out (unconscious) > 1 minute    Negative: Difficult to awaken or acting confused (e.g., disoriented, slurred speech)    Negative: Severe neck pain    Negative: Sounds like a life-threatening emergency to the triager    Negative: Head or forehead injury is main concern    Negative: Eye or orbit injury is main concern    Negative: Ear injury is main concern    Negative: Mouth injury is main concern    Negative: Nose injury is main concern    Negative: Teeth or tooth injury is main concern    Negative: Wound looks infected    Negative: Bleeding won't stop after 10 minutes of direct pressure (using correct technique)    Negative: Skin is split open or gaping (or length > 1/4 inch or 6 mm)    Negative: Crooked face or smile    Negative: Looks like a broken bone (e.g., cheekbone is flat on one side)    Negative: Can't fully open or close the mouth    Negative: Neck pain    Negative: Injury caused by high speed (e.g., MVA), great height (e.g., fall > 10 feet or 3 meters), or severe blow from hard object (e.g., golf club or baseball bat)    Negative: Knocked out (unconscious) < 1 minute and now fine    Negative: Closing the mouth and biting  "down does not feel normal    Negative: Double vision or unable to look upward    Negative: Numbness of the face (i.e., claims loss of sensation in part of face)    Negative: Taking Coumadin (warfarin) or other strong blood thinner, or known bleeding disorder (e.g., thrombocytopenia)    Negative: Sounds like a serious injury to the triager    Negative: Large swelling or bruise (> 2 inches or 5 cm)    Negative: SEVERE pain    Negative: Patient is an unreliable provider of information (e.g., dementia, severe intellectual disability, alcohol intoxication)    Negative: No prior tetanus shots (or is not fully vaccinated) and any wound (e.g., cut or scrape)    Negative: HIV positive or severe immunodeficiency (severely weak immune system) and DIRTY cut or scrape    Negative: Suspicious history for the injury    Negative: Last tetanus shot > 5 years ago and DIRTY cut or scrape    Negative: Last tetanus shot >10 years ago and CLEAN cut or scrape    Negative: Patient wants to be seen    Answer Assessment - Initial Assessment Questions  1. MECHANISM: \"How did the injury happen?\"       Fall-loading car for a 3.5 hour road trip.  She was carrying a small bag and was walking down 3 small cement stairs.  She somehow tripped on the last step going down.  She ended up falling face first on the cement.  She was bleeding from her nose and forehead.  She stopped the bleeding and used an ice pack for her forehead. She then drove. She has continued to use ice pack.   2. ONSET: \"When did the injury happen?\" (Minutes or hours ago)      Monday (5 days ago)  3. LOCATION: \"What part of the face is injured?\"      Forehead and nose  4. APPEARANCE of INJURY: \"What does the face look like?\"      Swollen, both eyes are bruised, her forehead has a large lump about size of 1/4 inch high and 1.5-1 inches  5. BLEEDING: \"Is it bleeding now?\" If so, ask, \"Is it difficult to stop?\"      no  6. PAIN: \"Is there pain?\" If so, ask: \"How bad is the pain?\"  " "(e.g., Scale 1-10; or mild, moderate, severe)      A little sore only in back and knee (which is normal)   7. SIZE: For cuts, bruises, or swelling, ask: \"How large is it?\" (e.g., inches or centimeters)       1.5x1 inch  8. TETANUS: For any breaks in the skin, ask: \"When was the last tetanus booster?\"      Unsure but believes she is up to date.  9. OTHER SYMPTOMS: \"Do you have any other symptoms?\" (e.g., neck pain, headache, loss of consciousness)      No neck pain, has full ROM, no headaches            She does not believe she has any broken bones, she can breath fully through her nose. No pain around eyes, normal vision.    Protocols used: FACE INJURY-JULIETTE Cam RN    Triage Nurse  [facility]  Appointment line: [phone]  [facility], 24 hour nurse line, available by calling clinic at [phone] and following prompts.       " 01-Jan-1975

## 2022-07-18 NOTE — H&P ADULT - PROBLEM SELECTOR PLAN 3
Bladder scan in ED demonstrating 600cc of urine, s/p straight cath. Likely sequelae of constipation.   -will bladder scan q6h  -constipation management as above Bladder scan in ED demonstrating 600cc of urine, s/p straight cath. Likely sequelae of constipation.   -will bladder scan q6h  -constipation management as above  -follow renal ultrasound

## 2022-07-18 NOTE — PHYSICAL THERAPY INITIAL EVALUATION ADULT - GENERAL OBSERVATIONS, REHAB EVAL
Received supine in emergency room +hammer, IV hep. Left as found +RN Rhoda aware Received supine in emergency room +hammer, IV hep, BUE restraints. Left as found +RN Rhoda aware

## 2022-07-18 NOTE — PHYSICAL THERAPY INITIAL EVALUATION ADULT - GAIT DEVIATIONS NOTED, PT EVAL
+sig retropulsion, shuffling side steps, highly unsteady, +BM further ambulation not appropriate/decreased indira/decreased step length/decreased weight-shifting ability

## 2022-07-18 NOTE — PHYSICAL THERAPY INITIAL EVALUATION ADULT - ADDITIONAL COMMENTS
no use of AD, no falls in past year, 3rd floor walk up, patient's wife assists with all ADLs, denies any falls in past year

## 2022-07-19 NOTE — DISCHARGE NOTE PROVIDER - NSDCMRMEDTOKEN_GEN_ALL_CORE_FT
aspirin 81 mg oral delayed release tablet: 1 tab(s) orally once a day  Multiple Vitamins oral tablet: 1 tab(s) orally once a day  tamsulosin 0.4 mg oral capsule: 1 cap(s) orally once a day (at bedtime)   ascorbic acid 500 mg oral tablet: 1 tab(s) orally every 24 hours  aspirin 81 mg oral delayed release tablet: 1 tab(s) orally once a day  Multiple Vitamins oral tablet: 1 tab(s) orally once a day  tamsulosin 0.4 mg oral capsule: 1 cap(s) orally once a day (at bedtime)  zinc sulfate 220 mg oral capsule: 1 cap(s) orally every 24 hours   ascorbic acid 500 mg oral tablet: 1 tab(s) orally every 24 hours  aspirin 81 mg oral delayed release tablet: 1 tab(s) orally once a day  Multiple Vitamins oral tablet: 1 tab(s) orally once a day  polyethylene glycol 3350 oral powder for reconstitution: 17 gram(s) orally once a day   tamsulosin 0.4 mg oral capsule: 1 cap(s) orally once a day (at bedtime)  zinc sulfate 220 mg oral capsule: 1 cap(s) orally every 24 hours

## 2022-07-19 NOTE — CONSULT NOTE ADULT - ASSESSMENT
per Internal Medicine     85 y o M with a PMHx of Colon Ca (dx 1996 w/short course of chemo and partial colectomy), BPH not on any medical therapy who presented to the ED with wife complaining of constipation for 1 week associated with abd pain.       Problem/Plan - 1:  ·  Problem: Constipation.   ·  Plan: Presented with constipation for 1 week, s/p fleet enema in ED with pellet like stool. Imaging not concerning for obstruction. TSH wnl. Started on bowel regiment (miralax, senna, lactulose). Patient had BM this morning 7/19.     - continue bowel regiment (miralax 2x daily, senna 2 tablets at bedtime)  - d/c lactulose as patient had BM this morning.    Problem/Plan - 2:  ·  Problem: DWIGHT (acute kidney injury).   ·  Plan: Unsure of baseline; however, presenting with elevated creatinine on admission. Hx of urinary retention, UA negative for infection. Could be 2/2 obstructive versus pre renal etiology.     - follow urine lytes  - follow Cr  - avoid nephrotoxic agents.    Problem/Plan - 3:  ·  Problem: Urinary retention.   ·  Plan: Bladder scan in ED demonstrating 600cc of urine, s/p straight cath. Likely sequelae of constipation. Renal U/S negative for hydronephrosis.    - d/c hammer catheter and trial of void as patient is now having BM  - bladder scan at 6pm to make sure patient is no longer retaining.    Problem/Plan - 4:  ·  Problem: Dementia.   ·  Plan: A&Ox1 (oriented to self) is his baseline according to his wife. Placed on restraints on 7/18 after attempting to pull out hammer catheter.     - obtain collateral from wife on patient's baseline  - d/c restraints today as patient is resting comfortably in bed and does not appear to be combative  - d/c to [facility] per PT recs.    Problem/Plan - 5:  ·  Problem: Leukocytosis.   ·  Plan: Likely reactive given constipation and abd pain. No s/s of infection, Xray clear, UA negative, no fever, all other vital signs stable.    - ctm off antibiotics.    Problem/Plan - 6:  ·  Problem: Anemia.   ·  Plan: Presenting with Hg 11. Unsure of baseline. No s/s bleeding currently.    - will ctm, transfuse < 7, maintain active t&s.    Problem/Plan - 7:  ·  Problem: Prophylactic measure.   ·  Plan: F-1L in ED, PO intake  E-replete PRN  N-soft diet  DVT-hep sq  GI-none.

## 2022-07-19 NOTE — DIETITIAN INITIAL EVALUATION ADULT - PERTINENT LABORATORY DATA
07-17    140  |  103  |  34<H>  ----------------------------<  107<H>  4.8   |  24  |  1.60<H>    Ca    9.5      17 Jul 2022 22:23  Mg     2.3     07-17    TPro  7.7  /  Alb  4.3  /  TBili  0.4  /  DBili  x   /  AST  20  /  ALT  14  /  AlkPhos  60  07-17

## 2022-07-19 NOTE — DISCHARGE NOTE PROVIDER - CARE PROVIDERS DIRECT ADDRESSES
[email or web address]

## 2022-07-19 NOTE — DISCHARGE NOTE PROVIDER - PROVIDER TOKENS
PROVIDER:[TOKEN:[[id number]],SCHEDULEDAPPT:[07/27/2022],SCHEDULEDAPPTTIME:[03:00 PM],ESTABLISHEDPATIENT:[T]]

## 2022-07-19 NOTE — DISCHARGE NOTE PROVIDER - ATTENDING DISCHARGE PHYSICAL EXAMINATION:
I have read and agree with the resident Discharge Note above.  Patient seen and discussed with resident team on the day of discharge.     Briefly, 86yo M with a PMHx of Colon Ca (dx 1996 w/short course of chemo and partial colectomy), BPH who presented with complaints of constipation and urinary retention    #Constipation - stool burden on CT but no colitis. s/p enema x2 with significant improvement.  Resolved    #Orthostatic hypotension - BP 70s/50s with PT today, will give IV bolus and reassess orthostatics in AM, encourage PO intake  - Resolved s/p IV bolus yesterday, patient asymptomatic    #Metabolic encephalopathy - patient A&Ox1 on admission and lethargic with improvement in his mental status s/p resolution of urinary retention.  No infectious source and cause likely due to urinary retention on top of baseline dementia.  returned to baseline. TSH wn    #Urinary retention - likely due to constipation, trial of void now that patient s/p BM.  Patient passed TOV but will monitor bladder scans q6 and trial straight cath over hammer  - Resolved    #DWIGHT - Cr 1.6 on admission with downtrend to 1.29, likely due to retention and poor PO intake    Attending exam on day of discharge:   Gen: NAD, sitting upright in bed   HEENT: NCAT, MMM, clear OP  Neck: supple, trachea at midline  CV: RRR, no m/g/r appreciated  Pulm: CTA B, no w/r/r, normal work of breathing  Abd: +BS, soft, NTND  : deferred  Skin: no rashes, ulcers, jaundice; intact, warm and dry  Ext: WWP, no c/c/e  MSK: 5/5 strength, normal range of motion, no swollen or erythematous joints  Lymph: no postauricular, supraclavicular or submandibular LAD  Neuro: AOx3, no change from baseline  Psych: pleasant, conversant and appropriate    I was physically present for the evaluation and management services provided. I agree with the included history, physical, and plan which I reviewed and edited where appropriate. I spent > 30 minutes with the patient and the patient's family on direct patient care and discharge planning with more than 50% of the visit spent on counseling and/or coordination of care.     Richi Gregorio  [phone] I have read and agree with the resident Discharge Note above.  Patient seen and discussed with resident team on the day of discharge.     Briefly, 86yo M with a PMHx of Colon Ca (dx 1996 w/short course of chemo and partial colectomy), BPH who presented with complaints of constipation and urinary retention    #Constipation - stool burden on CT but no colitis. s/p enema x2 with significant improvement.  Resolved    #Orthostatic hypotension - BP 70s/50s with PT today, will give IV bolus and reassess orthostatics in AM, encourage PO intake  - Resolved s/p IV bolus yesterday, patient asymptomatic    #Metabolic encephalopathy - patient A&Ox1 on admission and lethargic with improvement in his mental status s/p resolution of urinary retention.  No infectious source and cause likely due to urinary retention on top of baseline dementia.  returned to baseline. TSH wn    #Urinary retention - likely due to constipation, trial of void now that patient s/p BM.  Patient passed TOV but will monitor bladder scans q6 and trial straight cath over hammer  - Resolved    #DWIGHT - Cr 1.6 on admission with downtrend to 1.29, likely due to retention and poor PO intake.  Repeat BMP as outpatient    Attending exam on day of discharge:   Gen: NAD, sitting upright in bed, elderly male  HEENT: NCAT, MMM, clear OP  Neck: supple, trachea at midline  CV: RRR, no m/g/r appreciated  Pulm: CTA B, no w/r/r, normal work of breathing  Abd: +BS, soft, NTND  : deferred  Skin: no rashes, ulcers, jaundice; intact, warm and dry  Ext: WWP, no c/c/e  MSK: 4+/5 strength throughout, normal range of motion, no swollen or erythematous joints  Neuro: AOx1 to self, poor memory, no change from baseline  Psych: pleasant, conversant and appropriate    I was physically present for the evaluation and management services provided. I agree with the included history, physical, and plan which I reviewed and edited where appropriate. I spent > 30 minutes with the patient and the patient's family on direct patient care and discharge planning with more than 50% of the visit spent on counseling and/or coordination of care.     Richi Gregorio  [phone]

## 2022-07-19 NOTE — DIETITIAN INITIAL EVALUATION ADULT - OTHER INFO
88-year-old male with a PMHx of colon cancer (s/p left colectomy in 2016), dementia, BPH and TIA who presents with abdominal pain, constipation and urinary retention.    Pt seen at bedside for initial assessment. Assessment limited due to pt's AMS. Health aid at bedside assisted with assessment. Denies vomiting. Pt with fecal impaction, last BM 7/18. NKFA as per EMR. As per EMR, pt's PO intake has decreased significantly over the last 3-4 weeks, suspect <50% EER >1mo. Pt with 9lb wt loss since April 2022, reflecting a 5.5% wt loss in 3 months, insignificant but concerning. No edema documented at this time. Pt with two pressure injuries; one stage II pressure injury on buttox, stage I pressure injury on heel. Naveen score=15. Nutrition focused physical exam significant for moderate to severe orbital, temporal, and clavicle wasting. Based on ASPEN guidelines, pt meets the criteria for malnutrition at this time. As per HHA, pt only able to complete 25% of meals. HHA and patient agreeable to ensure TID. Made aware RD remains available. RD to follow up per protocol. See nutrition recommendations below.

## 2022-07-19 NOTE — DISCHARGE NOTE PROVIDER - CARE PROVIDER_API CALL
Hair Hull  INTERNAL MEDICINE  [address] [place] [id number]  Phone: [phone]  Fax: [phone]  Established Patient  Scheduled Appointment: 07/27/2022 03:00 PM

## 2022-07-19 NOTE — DIETITIAN INITIAL EVALUATION ADULT - PERTINENT MEDS FT
MEDICATIONS  (STANDING):  aspirin  chewable 81 milliGRAM(s) Oral daily  heparin   Injectable 5000 Unit(s) SubCutaneous every 8 hours  polyethylene glycol 3350 17 Gram(s) Oral two times a day  senna 2 Tablet(s) Oral at bedtime  tamsulosin 0.4 milliGRAM(s) Oral at bedtime    MEDICATIONS  (PRN):

## 2022-07-19 NOTE — DIETITIAN INITIAL EVALUATION ADULT - ADD RECOMMEND
1. Continue with current diet order   >>Add Ensure Enlive BID (1050kcal, 60g pro)   2. Encourage pt to meet nutritional needs as able   3. Pain and bowel regimen per team   4. Recommend MVI daily, Vit C 500mg, Zinc 220 mg daily to aid in wound healing   5. Monitor lytes, replete prn   6. Align Nutrition Interventions with GOC at all times   7. Will continue to assess/honor preferences as able  1. Continue with current diet order   >>Add Ensure Enlive TID (1050kcal, 60g pro)   2. Encourage pt to meet nutritional needs as able   3. Pain and bowel regimen per team   4. Recommend MVI daily, Vit C 500mg, Zinc 220 mg daily to aid in wound healing   5. Monitor lytes, replete prn   6. Align Nutrition Interventions with GOC at all times   7. Will continue to assess/honor preferences as able

## 2022-07-19 NOTE — DIETITIAN INITIAL EVALUATION ADULT - OTHER CALCULATIONS
%IBW: 87; ABW used to calculate estimated needs d/t pt being between 80 and 120% IBW. Adjusted for pressure injuries >stage II, advanced age, PCM. Please aim for upper end of needs. Fluids per team.

## 2022-07-19 NOTE — DISCHARGE NOTE PROVIDER - DETAILS OF MALNUTRITION DIAGNOSIS/DIAGNOSES
This patient has been assessed with a concern for Malnutrition and was treated during this hospitalization for the following Nutrition diagnosis/diagnoses:     -  07/19/2022: Severe protein-calorie malnutrition

## 2022-07-19 NOTE — DIETITIAN NUTRITION RISK NOTIFICATION - TREATMENT: THE FOLLOWING DIET HAS BEEN RECOMMENDED
Diet, Soft and Bite Sized (07-18-22 @ 18:33) [Active]      ** Please see RD note from 7/19   - Tomasa Gonzales RD

## 2022-07-19 NOTE — CONSULT NOTE ADULT - SUBJECTIVE AND OBJECTIVE BOX
Patient is a 88y old  Male who presents with a chief complaint of CONSTIPATION, URINARY RETENTION     (2022 11:20)        HPI:  Patient is an 84yo M with a PMHx of Dementia, Colon Ca (dx  w/short course of chemo and partial colectomy), BPH not on any medical therapy who presented to the ED with wife complaining of constipation for 1 week associated with abd pain. As per wife, patient has also not urinated since yesterday AM. As per wife, in the last month, has not been eating and drinking as much as normally, which has worsened in the last week. Currently patient without any acute complaints, denying any SOB, cough, fever, urinary symptoms. Endorsing abd pain.     In the ED  VSS  Labs WBC 12.5, Hg 11.9, BUN 34 Cr 1.6, UA negative  Imaging CT AP Study limited by motion artifact.  No acute intra-abdominal pathology visualized.  Nodular thickening ofthe left adrenal gland.  No evidence of obstrction.   Course Tylenol, Morphine 2mg x 2, Fleet enema, 1L Nacl   (2022 08:56)      PAST MEDICAL & SURGICAL HISTORY:  Sepsis  2/2 UTI- 2013      Colon cancer      BPH (benign prostatic hyperplasia)      Brain TIA      S/P colon resection          MEDICATIONS  (STANDING):  ascorbic acid 500 milliGRAM(s) Oral every 24 hours  aspirin  chewable 81 milliGRAM(s) Oral daily  heparin   Injectable 5000 Unit(s) SubCutaneous every 8 hours  polyethylene glycol 3350 17 Gram(s) Oral two times a day  senna 2 Tablet(s) Oral at bedtime  tamsulosin 0.4 milliGRAM(s) Oral at bedtime  zinc sulfate 220 milliGRAM(s) Oral every 24 hours    MEDICATIONS  (PRN):        Social History:                   -  Home Living Status :  lives with his wife in a 3rd floor walkup apartment         -  Prior Home Care Services :   none      Baseline Functional Level Prior to Admission :             - ADL's/ IADL's :  wife assists prn         - ambulatory status PTA :  walks without devices        FAMILY HISTORY:  FH: cancer  mother    FH: myocardial infarction  father      CBC Full  -  ( 2022 11:07 )  WBC Count : 12.95 K/uL  RBC Count : 3.34 M/uL  Hemoglobin : 10.4 g/dL  Hematocrit : 31.6 %  Platelet Count - Automated : 299 K/uL  Mean Cell Volume : 94.6 fl  Mean Cell Hemoglobin : 31.1 pg  Mean Cell Hemoglobin Concentration : 32.9 gm/dL  Auto Neutrophil # : x  Auto Lymphocyte # : x  Auto Monocyte # : x  Auto Eosinophil # : x  Auto Basophil # : x  Auto Neutrophil % : x  Auto Lymphocyte % : x  Auto Monocyte % : x  Auto Eosinophil % : x  Auto Basophil % : x          143  |  107  |  27<H>  ----------------------------<  109<H>  4.0   |  23  |  1.53<H>    Ca    8.8      2022 11:07  Phos  3.8       Mg     2.2         TPro  6.8  /  Alb  3.9  /  TBili  0.6  /  DBili  x   /  AST  45<H>  /  ALT  19  /  AlkPhos  59        Urinalysis Basic - ( 2022 08:07 )    Color: Yellow / Appearance: Clear / S.010 / pH: x  Gluc: x / Ketone: Trace mg/dL  / Bili: Negative / Urobili: 0.2 E.U./dL   Blood: x / Protein: NEGATIVE mg/dL / Nitrite: NEGATIVE   Leuk Esterase: NEGATIVE / RBC: 5-10 /HPF / WBC < 5 /HPF   Sq Epi: x / Non Sq Epi: 0-5 /HPF / Bacteria: None /HPF          Radiology :    < from: Xray Chest 1 View- PORTABLE-Urgent (Xray Chest 1 View- PORTABLE-Urgent .) (22 @ 12:02) >  ACC: [id number] EXAM:  XR CHEST PORTABLE URGENT 1V                          PROCEDURE DATE:  2022          INTERPRETATION:  Clinical history/reason for exam: Pneumonia.    Frontal chest.    COMPARISON: April 3, 2019.    Findings/  impression: No acute focal opacity. Heart size within normal limits,   thoracic aortic calcification. Stable bony structures. Dextroscoliosis.   Slight elevation of the left hemidiaphragm.        < from: CT Abdomen and Pelvis w/ Oral Cont and w/ IV Cont (22 @ 01:25) >  ACC: [id number] EXAM:  CT ABDOMEN AND PELVIS OC IC                          PROCEDURE DATE:  2022          INTERPRETATION:  CLINICAL INFORMATION: Abdominal pain, constipation.    COMPARISON: CT abdomen and pelvis 2019.    CONTRAST/COMPLICATIONS:  IV Contrast: Isovue 370  97 cc administered  Oral Contrast: Administered  Complications: None reported at time of study completion    PROCEDURE:  CT of the Abdomen and Pelvis was performed.  Sagittal and coronal reformats were performed.    FINDINGS:  Study limited by motion artifact.  LOWER CHEST: Within normal limits.    LIVER: Within normal limits.  BILE DUCTS: Not well assessed on this examination due to motion.  GALLBLADDER: Not well assessed on this examination due to motion.  SPLEEN: Within normal limits.  PANCREAS: Within normal limits.  ADRENALS: Nodular thickening of the left adrenal.  KIDNEYS/URETERS: Stable 2.5 cm cyst in the inferior pole of the left   kidney.    BLADDER: Within normal limits.  REPRODUCTIVE ORGANS: Prostate is enlarged.    BOWEL: No bowel obstruction. Stool burden to the ascending colon. Colonic   diverticulosis. Appendix is normal.  PERITONEUM: No ascites.  VESSELS: Aortic atherosclerotic calcifications.  RETROPERITONEUM/LYMPH NODES: No lymphadenopathy.  ABDOMINAL WALL: Within normal limits.  SOFT TISSUES: Edematous right scrotum.  BONES: Moderate to severe degenerative changes.    IMPRESSION:  Study limited by motion artifact.  No acute intra-abdominal pathology visualized.  Nodular thickening ofthe left adrenal gland.    I, Sylvester Cross MD, have reviewed the images and the report and agree   with the findings, with the following modification: Also noted is a   mildly distended and stool-filled rectum with mild surrounding   inflammatory changes. No apparent wall thickening. Correlate for   impaction versus stercoral colitis.                  Vital Signs Last 24 Hrs  T(C): 37.2 (2022 10:00), Max: 37.2 (2022 10:00)  T(F): 98.9 (2022 10:00), Max: 98.9 (2022 10:00)  HR: 79 (2022 10:00) (73 - 97)  BP: 121/57 (2022 10:00) (113/63 - 129/64)  BP(mean): --  RR: 17 (2022 10:00) (17 - 18)  SpO2: 98% (2022 10:00) (98% - 99%)    Parameters below as of 2022 10:00  Patient On (Oxygen Delivery Method): room air            REVIEW OF SYSTEMS:  per hpi        Physical Exam:  88 y o man lying in semi Singh's position , awake , alert , no acute complaints      Neurologic Exam:    Alert and oriented to person      Motor Exam:    Right UE:               no focal weakness ,  > 3+/5 throughout                                   Left UE:                 no focal weakness,  > 3+/5 throughout        Right LE:                no focal weakness,  > 3+/5 throughout    Left LE:                  no focal weakness,  > 3+/5 throughout        Sensation:         intact to light touch x 4 extremities                         DTR :                     biceps/brachioradialis : equal                                              patella/ankle : equal                                                                               neg Babinski        Gait :  not tested              PM&R Impression :     1) deconditioned    2) no focal weakness      Recommendations / Plan :     1) Physical / Occupational therapy focusing on therapeutic exercises , bed mobility/transfer out of bed evaluation , progressive ambulation with assistive       devices prn .    2) Anticipated Disposition Plan / Recs  :    subacute rehab placement

## 2022-07-19 NOTE — DISCHARGE NOTE PROVIDER - NSDCFUSCHEDAPPT_GEN_ALL_CORE_FT
[facility]  Neuro [address]  Scheduled Appointment: 10/05/2022     [facility]  INT[facility] [address] [facility]  Scheduled Appointment: 07/27/2022    [facility]  Neuro [address]  Scheduled Appointment: 10/05/2022

## 2022-07-19 NOTE — PROGRESS NOTE ADULT - SUBJECTIVE AND OBJECTIVE BOX
INTERVAL HPI/OVERNIGHT EVENTS:  Patient was seen and examined at bedside. Patient was placed on restraints overnight because he was altered and attempting to pull out his hammer catheter. In the morning, patient was calm and laying in bed. A&Ox1, not able to respond to many questions. Patient was able to state that he is not having any pain. Patient had BM this morning as per nurse.    VITAL SIGNS:  T(F): 98.9 (22 @ 10:00)  HR: 79 (22 @ 10:00)  BP: 121/57 (22 @ 10:00)  RR: 17 (22 @ 10:00)  SpO2: 98% (22 @ 10:00)  Wt(kg): --      22 @ 07:01  -  22 @ 07:00  --------------------------------------------------------  IN: 0 mL / OUT: 1250 mL / NET: -1250 mL        PHYSICAL EXAM:    Constitutional: resting comfortably in bed; NAD  HEENT: NC/AT, PER, anicteric sclera, no nasal discharge; MMM  Respiratory: CTA B/L; no W/R/R, no retractions  Cardiac: +S1/S2; RRR; no M/R/G  Gastrointestinal: soft, NT/ND; no rebound or guarding  Extremities: WWP, no clubbing or cyanosis; no peripheral edema  Musculoskeletal: NROM x4; no joint swelling, tenderness or erythema  Vascular: 2+ radial, DP/PT pulses B/L  Dermatologic: skin warm, dry and intact; no rashes, wounds, or scars  Neurologic: AAOx1; CNII-XII grossly intact; no focal deficits  Psychiatric: patient kept his eyes closed, only answered a few questions, hard to get a response from him. consistent w/ his baseline according to H&P of advanced dementia    MEDICATIONS  (STANDING):  aspirin  chewable 81 milliGRAM(s) Oral daily  heparin   Injectable 5000 Unit(s) SubCutaneous every 8 hours  polyethylene glycol 3350 17 Gram(s) Oral two times a day  senna 2 Tablet(s) Oral at bedtime  tamsulosin 0.4 milliGRAM(s) Oral at bedtime    MEDICATIONS  (PRN):      Allergies    No Known Allergies    Intolerances        LABS:                        10.4   12.95 )-----------( 299      ( 2022 11:07 )             31.6         140  |  103  |  34<H>  ----------------------------<  107<H>  4.8   |  24  |  1.60<H>    Ca    9.5      2022 22:23  Mg     2.3         TPro  7.7  /  Alb  4.3  /  TBili  0.4  /  DBili  x   /  AST  20  /  ALT  14  /  AlkPhos  60      PT/INR - ( 2022 22:23 )   PT: 12.3 sec;   INR: 1.03          PTT - ( 2022 22:23 )  PTT:32.6 sec  Urinalysis Basic - ( 2022 08:07 )    Color: Yellow / Appearance: Clear / S.010 / pH: x  Gluc: x / Ketone: Trace mg/dL  / Bili: Negative / Urobili: 0.2 E.U./dL   Blood: x / Protein: NEGATIVE mg/dL / Nitrite: NEGATIVE   Leuk Esterase: NEGATIVE / RBC: 5-10 /HPF / WBC < 5 /HPF   Sq Epi: x / Non Sq Epi: 0-5 /HPF / Bacteria: None /HPF        RADIOLOGY & ADDITIONAL TESTS:  Reviewed

## 2022-07-19 NOTE — DISCHARGE NOTE PROVIDER - HOSPITAL COURSE
#Discharge: do not delete    Patient is an 84 yo M with past medical history of dementia, colon cancer (dx 1996 w/ short course of chemo and partial colectomy), BPH not on any medical therapy who presented to the ED w/ wife complaining of constipation for 1 week associated w/ abdominal pain, and found to have urinary retention.    Inpatient treatment course:    Problem List/Main Diagnoses (system-based):    #Constipation  Presented with constipation for 1 week, s/p fleet enema in ED with pellet like stool. Imaging not concerning for obstruction. TSH wnl. Started on bowel regiment (miralax, senna, lactulose). After arrival to [facility], patient began to have leaky liquid stool, likely in the setting of still being impacted. Ordered tap water enema.     - continue bowel regiment (miralax 2x daily, senna 2 tablets at bedtime)    #DWIGHT (acute kidney injury)  Unsure of baseline; however, presenting with elevated creatinine on admission. Hx of urinary retention, UA negative for infection. Could be 2/2 obstructive versus pre renal etiology. Cr on admission 1.6 -->    #Urinary retention  Bladder scan in ED demonstrating 600cc of urine, s/p straight cath. Likely sequelae of constipation. Renal U/S negative for hydronephrosis. On [facility], straight cath was removed w/ trial of void. Continued bladder scans q6...    #Dementia  A&Ox1 (oriented to self) is his baseline according to his wife. Baseline is overall pleasant, able to walk around his apartment and go on short walks around his neighborhood. Used to eat regularly and take Ensures from the fridge for himself until about a month ago. Since then patient has gradually been eating less. 3 days before admission, patient was not eating at all. Placed on restraints on 7/18 after attempting to pull out hammer catheter. Restraints were discontinued on 7/19 as patient was no longer combative. Upon arrival to the floor, patient was very tired and had difficulty communicating...    #Leukocytosis  Presented w/ WBC 12.55 -->... Likely reactive given constipation and abd pain. No s/s of infection, Xray clear, UA negative, no fever, all other vital signs stable. Patient did not receive antibiotics.    #Anemia  Presented with Hg 11. Unsure of baseline. No s/s bleeding currently. Continued to trend Hg.           Patient was discharged to: (home/MIA/acute rehab/hospice, etc, and with what services – home health PT/RN? Home O2?)  New medications:  Changes to old medications:  Medications that were stopped:   Items to follow up as outpatient:  Physical exam at the time of discharge: #Discharge: do not delete    Patient is an 84 yo M with past medical history of dementia, colon cancer (dx 1996 w/ short course of chemo and partial colectomy), BPH not on any medical therapy who presented to the ED w/ wife complaining of constipation for 1 week associated w/ abdominal pain, and found to have urinary retention.    Inpatient treatment course:    Problem List/Main Diagnoses (system-based):    #Constipation  RESOLVED Presented with constipation for 1 week, s/p fleet enema in ED with pellet like stool. Imaging not concerning for obstruction. TSH wnl. Started on bowel regiment (miralax, senna, lactulose). After arrival to [facility], patient began to have leaky liquid stool, likely in the setting of still being impacted. Tap water enema performed and released a significant amount of stool. Likely structural constipation.     #DWIGHT (acute kidney injury)  Unsure of baseline; however, presenting with elevated creatinine on admission. Hx of urinary retention, UA negative for infection. Could be 2/2 obstructive versus pre renal etiology. Received 1L NaCl in ED and LR maintenance fluids while on RMF. Cr on admission 1.6 -->   - f/u w/ PCP    #Urinary retention  Bladder scan in ED demonstrating 600cc of urine, s/p straight cath. Likely sequelae of constipation. Renal U/S negative for hydronephrosis. On F, straight cath was removed w/ trial of void. PVR showed 34ml, indicating that patient is no longer retaining urine.    #Dementia  A&Ox1 (oriented to self) is his baseline according to his wife. Baseline is overall pleasant, able to walk around his apartment and go on short walks around his neighborhood. Used to eat regularly and take Ensures from the fridge for himself until about a month ago. Since then patient has gradually been eating less. 3 days before admission, patient was not eating at all. Placed on restraints on 7/18 after attempting to pull out hammer catheter. Restraints were discontinued on 7/19 as patient was no longer combative. Upon arrival to the floor, patient was very tired and had difficulty communicating. After tap water enema, the next morning patient was much more alert likely at baseline and was able to eat breakfast.     #Leukocytosis  Presented w/ WBC 12.55 -->... Likely reactive given constipation and abd pain. No s/s of infection, Xray clear, UA negative, no fever, all other vital signs stable. Patient did not receive antibiotics.    #Anemia  Presented with Hg 11. Unsure of baseline. No s/s bleeding currently.  - f/u outpatient        Patient was discharged to: home with home PT  New medications: none  Changes to old medications: none  Medications that were stopped: none  Items to follow up as outpatient:   - f/u Cr and DWIGHT  - f/u anemia Hg 11  Physical exam at the time of discharge:    VITAL SIGNS:  T(C): 36.7 (07-20-22 @ 05:57), Max: 36.8 (07-19-22 @ 16:45)  T(F): 98 (07-20-22 @ 05:57), Max: 98.2 (07-19-22 @ 16:45)  HR: 76 (07-20-22 @ 05:57) (76 - 78)  BP: 113/57 (07-20-22 @ 05:57) (105/58 - 113/57)  BP(mean): --  RR: 16 (07-20-22 @ 05:57) (16 - 16)  SpO2: 98% (07-20-22 @ 05:57) (98% - 98%)  Wt(kg): --    PHYSICAL EXAM:  Constitutional: WDWN resting comfortably in bed; NAD  Head: NC/AT  Eyes: PERRL, EOMI, anicteric sclera  ENT: no nasal discharge; uvula midline, no oropharyngeal erythema or exudates; MMM  Neck: supple; no JVD or thyromegaly  Respiratory: CTA B/L; no W/R/R, no retractions  Cardiac: +S1/S2; RRR; no M/R/G; PMI non-displaced  Gastrointestinal: abdomen soft, NT/ND; no rebound or guarding  Back: spine midline, no bony tenderness or step-offs; no CVAT B/L  Extremities: WWP, no clubbing or cyanosis; no peripheral edema  Musculoskeletal: NROM x4; no joint swelling, tenderness or erythema  Vascular: 2+ radial, DP pulses B/L  Dermatologic: skin warm, dry and intact; no rashes, wounds, or scars  Neurologic: AAOx3; CNII-XII grossly intact; no focal deficits  Psychiatric: affect and characteristics of appearance, verbalizations, behaviors are appropriate #Discharge: do not delete    Patient is an 86 yo M with past medical history of dementia, colon cancer (dx 1996 w/ short course of chemo and partial colectomy), BPH not on any medical therapy who presented to the ED w/ wife complaining of constipation for 1 week associated w/ abdominal pain, and found to have urinary retention.    Inpatient treatment course:    Problem List/Main Diagnoses (system-based):    #Constipation  RESOLVED Presented with constipation for 1 week, s/p fleet enema in ED with pellet like stool. Imaging not concerning for obstruction. TSH wnl. Started on bowel regiment (miralax, senna, lactulose). After arrival to [email or web address] patient began to have leaky liquid stool, likely in the setting of still being impacted. Tap water enema performed and released a significant amount of stool. Likely structural constipation.     #DWIGHT (acute kidney injury)  Unsure of baseline; however, presenting with elevated creatinine on admission. Hx of urinary retention, UA negative for infection. Could be 2/2 obstructive versus pre renal etiology. Received 1L NaCl in ED and LR maintenance fluids while on F. Cr on admission 1.6-->1.53.  - f/u w/ PCP    #Urinary retention  Bladder scan in ED demonstrating 600cc of urine, s/p straight cath. Likely sequelae of constipation. Renal U/S negative for hydronephrosis. On F, straight cath was removed w/ trial of void. PVR showed 34ml, indicating that patient is no longer retaining urine.    #Dementia  A&Ox1 (oriented to self) is his baseline according to his wife. Baseline is overall pleasant, able to walk around his apartment and go on short walks around his neighborhood. Used to eat regularly and take Ensures from the fridge for himself until about a month ago. Since then patient has gradually been eating less. 3 days before admission, patient was not eating at all. Placed on restraints on 7/18 after attempting to pull out hammer catheter. Restraints were discontinued on 7/19 as patient was no longer combative. Upon arrival to the floor, patient was very tired and had difficulty communicating. After tap water enema, the next morning patient was much more alert likely at baseline and was able to eat breakfast.     #Leukocytosis  Presented w/ WBC 12.55 -->... Likely reactive given constipation and abd pain. No s/s of infection, Xray clear, UA negative, no fever, all other vital signs stable. Patient did not receive antibiotics.    #Anemia  Presented with Hg 11. Unsure of baseline. No s/s bleeding currently.  - f/u outpatient        Patient was discharged to: home with home PT  New medications: none  Changes to old medications: none  Medications that were stopped: none  Items to follow up as outpatient:   - f/u Cr and DWIGHT  - f/u anemia Hg 11  Physical exam at the time of discharge:    VITAL SIGNS:  T(C): 36.7 (07-20-22 @ 05:57), Max: 36.8 (07-19-22 @ 16:45)  T(F): 98 (07-20-22 @ 05:57), Max: 98.2 (07-19-22 @ 16:45)  HR: 76 (07-20-22 @ 05:57) (76 - 78)  BP: 113/57 (07-20-22 @ 05:57) (105/58 - 113/57)  BP(mean): --  RR: 16 (07-20-22 @ 05:57) (16 - 16)  SpO2: 98% (07-20-22 @ 05:57) (98% - 98%)  Wt(kg): --    PHYSICAL EXAM:  Constitutional: WDWN resting comfortably in bed; NAD  Head: NC/AT  Eyes: PERRL, EOMI, anicteric sclera  ENT: no nasal discharge; uvula midline, no oropharyngeal erythema or exudates; MMM  Neck: supple; no JVD or thyromegaly  Respiratory: CTA B/L; no W/R/R, no retractions  Cardiac: +S1/S2; RRR; no M/R/G; PMI non-displaced  Gastrointestinal: abdomen soft, NT/ND; no rebound or guarding  Back: spine midline, no bony tenderness or step-offs; no CVAT B/L  Extremities: WWP, no clubbing or cyanosis; no peripheral edema  Musculoskeletal: NROM x4; no joint swelling, tenderness or erythema  Vascular: 2+ radial, DP pulses B/L  Dermatologic: skin warm, dry and intact; no rashes, wounds, or scars  Neurologic: AAOx3; CNII-XII grossly intact; no focal deficits  Psychiatric: affect and characteristics of appearance, verbalizations, behaviors are appropriate #Discharge: do not delete    Patient is an 84 yo M with past medical history of dementia, colon cancer (dx 1996 w/ short course of chemo and partial colectomy), BPH not on any medical therapy who presented to the ED w/ wife complaining of constipation for 1 week associated w/ abdominal pain, and found to have urinary retention.    Inpatient treatment course:    Problem List/Main Diagnoses (system-based):    #Constipation  RESOLVED Presented with constipation for 1 week, s/p fleet enema in ED with pellet like stool. Imaging not concerning for obstruction. TSH wnl. Started on bowel regiment (miralax, senna, lactulose). After arrival to [facility], patient began to have leaky liquid stool, likely in the setting of still being impacted. Tap water enema performed and released a significant amount of stool. Likely structural constipation.     #DWIGHT (acute kidney injury)  Unsure of baseline; however, presenting with elevated creatinine on admission. Hx of urinary retention, UA negative for infection. Could be 2/2 obstructive versus pre renal etiology. Received 1L NaCl in ED and LR maintenance fluids while on RMF. Cr on admission 1.6-->1.53.  - f/u w/ PCP    #Urinary retention  Bladder scan in ED demonstrating 600cc of urine, s/p straight cath. Likely sequelae of constipation. Renal U/S negative for hydronephrosis. On F, straight cath was removed w/ trial of void. PVR showed 34ml, indicating that patient is no longer retaining urine.    #Dementia  A&Ox1 (oriented to self) is his baseline according to his wife. Baseline is overall pleasant, able to walk around his apartment and go on short walks around his neighborhood. Used to eat regularly and take Ensures from the fridge for himself until about a month ago. Since then patient has gradually been eating less. 3 days before admission, patient was not eating at all. Placed on restraints on 7/18 after attempting to pull out hammer catheter. Restraints were discontinued on 7/19 as patient was no longer combative. Upon arrival to the floor, patient was very tired and had difficulty communicating. After tap water enema, the next morning patient was much more alert likely at baseline and was able to eat breakfast. Patient was evaluated by PT, who recommended MIA. Patient and his wife prefer to go home w/ home PT.    #Leukocytosis  Presented w/ WBC 12.55 -->... Likely reactive given constipation and abd pain. No s/s of infection, Xray clear, UA negative, no fever, all other vital signs stable. Patient did not receive antibiotics.    #Anemia  Presented with Hg 11. Unsure of baseline. No s/s bleeding currently.  - f/u outpatient        Patient was discharged to: home with home PT  New medications: none  Changes to old medications: none  Medications that were stopped: none  Items to follow up as outpatient:   - f/u Cr and DWIGHT  - f/u anemia Hg 11  Physical exam at the time of discharge:    VITAL SIGNS:  T(C): 36.7 (07-20-22 @ 05:57), Max: 36.8 (07-19-22 @ 16:45)  T(F): 98 (07-20-22 @ 05:57), Max: 98.2 (07-19-22 @ 16:45)  HR: 76 (07-20-22 @ 05:57) (76 - 78)  BP: 113/57 (07-20-22 @ 05:57) (105/58 - 113/57)  BP(mean): --  RR: 16 (07-20-22 @ 05:57) (16 - 16)  SpO2: 98% (07-20-22 @ 05:57) (98% - 98%)  Wt(kg): --    PHYSICAL EXAM:  Constitutional: WDWN resting comfortably in bed; NAD  Head: NC/AT  Eyes: PERRL, EOMI, anicteric sclera  ENT: no nasal discharge; uvula midline, no oropharyngeal erythema or exudates; MMM  Respiratory: CTA B/L; no W/R/R, no retractions  Cardiac: +S1/S2; RRR; no M/R/G  Gastrointestinal: abdomen soft, NT/ND; no rebound or guarding  Extremities: WWP, no clubbing or cyanosis; no peripheral edema  Musculoskeletal: NROM x4; no joint swelling, tenderness or erythema  Vascular: 2+ radial, DP pulses B/L  Dermatologic: skin warm, dry and intact; no rashes, wounds, or scars  Neurologic: AAOx1; CNII-XII grossly intact; no focal deficits  Psychiatric: affect and characteristics of appearance, verbalizations, behaviors are appropriate #Discharge: do not delete    Patient is an 86 yo M with past medical history of dementia, colon cancer (dx 1996 w/ short course of chemo and partial colectomy), BPH not on any medical therapy who presented to the ED w/ wife complaining of constipation for 1 week associated w/ abdominal pain, and found to have urinary retention.    Inpatient treatment course:    Problem List/Main Diagnoses (system-based):    #Constipation  RESOLVED Presented with constipation for 1 week, s/p fleet enema in ED with pellet like stool. Imaging not concerning for obstruction. TSH wnl. Started on bowel regiment (miralax, senna, lactulose). After arrival to [facility], patient began to have leaky liquid stool, likely in the setting of still being impacted. Tap water enema performed and released a significant amount of stool. Likely structural constipation.   -c/w miralax at home    #DWIGHT (acute kidney injury)  Unsure of baseline; however, presenting with elevated creatinine on admission. Hx of urinary retention, UA negative for infection. Could be 2/2 obstructive versus pre renal etiology. Received 1L NaCl in ED and LR maintenance fluids while on RMF. Cr on admission 1.6-->1.53.  - f/u w/ PCP and repeat BMP to monitor Creatinine    #Urinary retention  Bladder scan in ED demonstrating 600cc of urine, s/p straight cath. Likely sequelae of constipation. Renal U/S negative for hydronephrosis. On F, straight cath was removed w/ trial of void. PVR showed 34ml, indicating that patient is no longer retaining urine.    #Dementia  A&Ox1 (oriented to self) is his baseline according to his wife. Baseline is overall pleasant, able to walk around his apartment and go on short walks around his neighborhood. Used to eat regularly and take Ensures from the fridge for himself until about a month ago. Since then patient has gradually been eating less. 3 days before admission, patient was not eating at all. Placed on restraints on 7/18 after attempting to pull out hammer catheter. Restraints were discontinued on 7/19 as patient was no longer combative. Upon arrival to the floor, patient was very tired and had difficulty communicating. After tap water enema, the next morning patient was much more alert likely at baseline and was able to eat breakfast. Patient was evaluated by PT, who recommended MIA. Patient and his wife prefer to go home w/ home PT.    #Leukocytosis  Presented w/ WBC 12.55 -->... Likely reactive given constipation and abd pain. No s/s of infection, Xray clear, UA negative, no fever, all other vital signs stable. Patient did not receive antibiotics.    #Anemia  Presented with Hg 11. Unsure of baseline. No s/s bleeding currently.  - f/u outpatient        Patient was discharged to: home with home PT  New medications: none  Changes to old medications: none  Medications that were stopped: none  Items to follow up as outpatient:   - f/u Cr and DWIGHT  - f/u anemia Hg 11  Physical exam at the time of discharge:    VITAL SIGNS:  T(C): 36.7 (07-20-22 @ 05:57), Max: 36.8 (07-19-22 @ 16:45)  T(F): 98 (07-20-22 @ 05:57), Max: 98.2 (07-19-22 @ 16:45)  HR: 76 (07-20-22 @ 05:57) (76 - 78)  BP: 113/57 (07-20-22 @ 05:57) (105/58 - 113/57)  BP(mean): --  RR: 16 (07-20-22 @ 05:57) (16 - 16)  SpO2: 98% (07-20-22 @ 05:57) (98% - 98%)  Wt(kg): --    PHYSICAL EXAM:  Constitutional: WDWN resting comfortably in bed; NAD  Head: NC/AT  Eyes: PERRL, EOMI, anicteric sclera  ENT: no nasal discharge; uvula midline, no oropharyngeal erythema or exudates; MMM  Respiratory: CTA B/L; no W/R/R, no retractions  Cardiac: +S1/S2; RRR; no M/R/G  Gastrointestinal: abdomen soft, NT/ND; no rebound or guarding  Extremities: WWP, no clubbing or cyanosis; no peripheral edema  Musculoskeletal: NROM x4; no joint swelling, tenderness or erythema  Vascular: 2+ radial, DP pulses B/L  Dermatologic: skin warm, dry and intact; no rashes, wounds, or scars  Neurologic: AAOx1; CNII-XII grossly intact; no focal deficits  Psychiatric: affect and characteristics of appearance, verbalizations, behaviors are appropriate

## 2022-07-19 NOTE — DISCHARGE NOTE PROVIDER - NSDCCPCAREPLAN_GEN_ALL_CORE_FT
PRINCIPAL DISCHARGE DIAGNOSIS  Diagnosis: Constipation  Assessment and Plan of Treatment:       SECONDARY DISCHARGE DIAGNOSES  Diagnosis: Urinary retention  Assessment and Plan of Treatment:      PRINCIPAL DISCHARGE DIAGNOSIS  Diagnosis: Constipation  Assessment and Plan of Treatment: You came to the hosptial for constipation, causing abdominal pain and urinary retention. Constipation can be caused by many things, including lifestyle factors such as not enough fiber intake or decreased activity. In your case, you had constipation likely because of lifestyle factors. Your constipation has now resolved after a tap water enema. Please follow up with your primary care doctor to dicsuss ways to prevent future constipation, such as increasing fiber intake or starting a bowel regiment.      SECONDARY DISCHARGE DIAGNOSES  Diagnosis: Urinary retention  Assessment and Plan of Treatment: You were found to have urinary retention while in the hosptial. This is when you cannot release your urine on your own, requiring the placement of a hammer catheter to drain the urine. Urinary retention can cause extreme discomfort and abdominal pain. This was likely caused by constipation. After your tap water enema, your hammer catheter was taken out and you were no longer showing signs of urinary retention.    Diagnosis: DWIGHT (acute kidney injury)  Assessment and Plan of Treatment: During this hospital admission you were found to have acute kidney injury. Acute kidney injury (DWIGHT) is a sudden episode of kidney failure or kidney damage that happens within a few hours or a few days. DWIGHT causes a build-up of waste products in your blood and makes it hard for your kidneys to keep the right balance of fluid in your body. Your kidney numbers have been improving. Please follow up with your primary care doctor for further evaluation and testing.      Diagnosis: Anemia  Assessment and Plan of Treatment: When you came to the hospital you were found to have anemia. Anemia is the medical term for when a person has too few red blood cells. Red blood cells carry oxygen throughout your body; if you have too few red blood cells, your body is not able to get all the oxygen it needs. Most people with anemia have no symptoms, however common symptoms include: increased fatigue, shortness of breath, tiring easily, and headaches. If you experience any of these symptoms, or have any episodes of bloody vomit or bloody stool (can be red stool or black stool), please see your primary care provider or go to your emergency room for further evaluation.       PRINCIPAL DISCHARGE DIAGNOSIS  Diagnosis: Constipation  Assessment and Plan of Treatment: You came to the hosptial for constipation, causing abdominal pain and urinary retention. Constipation can be caused by many things, including lifestyle factors such as not enough fiber intake or decreased activity. In your case, you had constipation likely because of lifestyle factors. Your constipation has now resolved after a tap water enema. Please follow up with your primary care doctor to dicsuss ways to prevent future constipation, such as increasing fiber intake or starting a bowel regiment.  We gave you a prescription for Miralax; please take this medication daily in order to regulate your bowel movements.         SECONDARY DISCHARGE DIAGNOSES  Diagnosis: Urinary retention  Assessment and Plan of Treatment: You were found to have urinary retention while in the hosptial. This is when you cannot release your urine on your own, requiring the placement of a hammer catheter to drain the urine. Urinary retention can cause extreme discomfort and abdominal pain. This was likely caused by constipation. After your tap water enema, your hammer catheter was taken out and you were no longer showing signs of urinary retention.    Diagnosis: DWIGHT (acute kidney injury)  Assessment and Plan of Treatment: During this hospital admission you were found to have acute kidney injury. Acute kidney injury (DWIGHT) is a sudden episode of kidney failure or kidney damage that happens within a few hours or a few days. DWIGHT causes a build-up of waste products in your blood and makes it hard for your kidneys to keep the right balance of fluid in your body. Your kidney numbers have been improving. Please follow up with your primary care doctor for further evaluation and testing.  You have an appointment with Dr. Hull on July 27th at 3:00 PM. At this appointment, make sure that Dr. Hull does a blood test to measure your creatinine level to make sure your kidney function is improving.       Diagnosis: Anemia  Assessment and Plan of Treatment: When you came to the hospital you were found to have anemia. Anemia is the medical term for when a person has too few red blood cells. Red blood cells carry oxygen throughout your body; if you have too few red blood cells, your body is not able to get all the oxygen it needs. Most people with anemia have no symptoms, however common symptoms include: increased fatigue, shortness of breath, tiring easily, and headaches. If you experience any of these symptoms, or have any episodes of bloody vomit or bloody stool (can be red stool or black stool), please see your primary care provider or go to your emergency room for further evaluation.

## 2022-07-19 NOTE — DIETITIAN INITIAL EVALUATION ADULT - NSFNSPHYEXAMSKINFT_GEN_A_CORE
Pressure Injury 1: Bilateral:,buttocks, Stage I,Stage II  Pressure Injury 2: Left:,heel, Stage I  Pressure Injury 3: none, none  Pressure Injury 4: none, none  Pressure Injury 5: none, none  Pressure Injury 6: none, none  Pressure Injury 7: none, none  Pressure Injury 8: none, none  Pressure Injury 9: none, none  Pressure Injury 10: none, none  Pressure Injury 11: none, none, Pressure Injury 1: Bilateral:,buttocks, Stage I  Pressure Injury 2: Left:,heel, Stage I  Pressure Injury 3: none, none  Pressure Injury 4: none, none  Pressure Injury 5: none, none  Pressure Injury 6: none, none  Pressure Injury 7: none, none  Pressure Injury 8: none, none  Pressure Injury 9: none, none  Pressure Injury 10: none, none  Pressure Injury 11: none, none

## 2022-07-20 NOTE — PROGRESS NOTE ADULT - SUBJECTIVE AND OBJECTIVE BOX
INTERVAL HPI/OVERNIGHT EVENTS:  Patient was seen and examined at bedside. As per nurse and patient, no o/n events, patient resting comfortably. Not retaining urine overnight, no catheter was placed. Patient is significantly more alert and awake than yesterday, at baseline A&Ox1. Patient denies: fever, chills, lightheadedness, weakness, CP, palpitations, SOB, cough, N/V. ROS otherwise negative.    VITAL SIGNS:  T(F): 97.7 (07-20-22 @ 10:00)  HR: 72 (07-20-22 @ 10:00)  BP: 110/58 (07-20-22 @ 10:00)  RR: 19 (07-20-22 @ 10:00)  SpO2: 97% (07-20-22 @ 10:00)  Wt(kg): --      07-19-22 @ 07:01  -  07-20-22 @ 07:00  --------------------------------------------------------  IN: 420 mL / OUT: 0 mL / NET: 420 mL        PHYSICAL EXAM:    Constitutional: resting comfortably in bed; NAD  HEENT: NC/AT, PER, anicteric sclera, no nasal discharge; MMM  Neck: supple; no JVD or thyromegaly  Respiratory: CTA B/L; no W/R/R, no retractions  Cardiac: +S1/S2; RRR; no M/R/G  Gastrointestinal: soft, NT/ND; no rebound or guarding  Back: spine midline, no bony tenderness or step-offs; no CVAT B/L  Extremities: WWP, no clubbing or cyanosis; no peripheral edema  Musculoskeletal: NROM x4; no joint swelling, tenderness or erythema  Vascular: 2+ radial, DP/PT pulses B/L  Dermatologic: skin warm, dry and intact; no rashes, wounds, or scars  Neurologic: AAOx3; CNII-XII grossly intact; no focal deficits  Psychiatric: affect and characteristics of appearance, verbalizations, behaviors are appropriate    MEDICATIONS  (STANDING):  ascorbic acid 500 milliGRAM(s) Oral every 24 hours  aspirin  chewable 81 milliGRAM(s) Oral daily  heparin   Injectable 5000 Unit(s) SubCutaneous every 8 hours  multivitamin 1 Tablet(s) Oral daily  polyethylene glycol 3350 17 Gram(s) Oral two times a day  senna 2 Tablet(s) Oral at bedtime  sodium chloride 0.9%. 1000 milliLiter(s) (250 mL/Hr) IV Continuous <Continuous>  tamsulosin 0.4 milliGRAM(s) Oral at bedtime  zinc sulfate 220 milliGRAM(s) Oral every 24 hours    MEDICATIONS  (PRN):      Allergies    No Known Allergies    Intolerances        LABS:                        10.2   12.78 )-----------( 268      ( 20 Jul 2022 12:19 )             30.9     07-20    144  |  109<H>  |  24<H>  ----------------------------<  125<H>  3.6   |  24  |  1.29    Ca    9.1      20 Jul 2022 12:19  Phos  3.8     07-19  Mg     2.2     07-19    TPro  6.8  /  Alb  3.9  /  TBili  0.6  /  DBili  x   /  AST  45<H>  /  ALT  19  /  AlkPhos  59  07-19          RADIOLOGY & ADDITIONAL TESTS:  Reviewed INTERVAL HPI/OVERNIGHT EVENTS:  Patient was seen and examined at bedside. As per nurse and patient, no o/n events, patient resting comfortably. Not retaining urine overnight, no catheter was placed. Patient is significantly more alert and awake than yesterday, at baseline A&Ox1. Patient denies: fever, chills, lightheadedness, weakness, CP, palpitations, SOB, cough, N/V. ROS otherwise negative.      VITAL SIGNS:  T(F): 97.7 (07-20-22 @ 10:00)  HR: 72 (07-20-22 @ 10:00)  BP: 110/58 (07-20-22 @ 10:00)  RR: 19 (07-20-22 @ 10:00)  SpO2: 97% (07-20-22 @ 10:00)  Wt(kg): --      07-19-22 @ 07:01  -  07-20-22 @ 07:00  --------------------------------------------------------  IN: 420 mL / OUT: 0 mL / NET: 420 mL        PHYSICAL EXAM:    Constitutional: resting comfortably in bed; NAD  HEENT: NC/AT, PER, anicteric sclera, no nasal discharge; MMM  Neck: supple; no JVD or thyromegaly  Respiratory: CTA B/L; no W/R/R, no retractions  Cardiac: +S1/S2; RRR; no M/R/G  Gastrointestinal: soft, NT/ND; no rebound or guarding  Extremities: WWP, no clubbing or cyanosis; no peripheral edema  Musculoskeletal: NROM x4; no joint swelling, tenderness or erythema  Vascular: 2+ radial, DP/PT pulses B/L  Dermatologic: skin warm, dry and intact; no rashes, wounds, or scars  Neurologic: AAOx1; CNII-XII grossly intact; no focal deficits  Psychiatric: affect and characteristics of appearance, verbalizations, behaviors are appropriate    MEDICATIONS  (STANDING):  ascorbic acid 500 milliGRAM(s) Oral every 24 hours  aspirin  chewable 81 milliGRAM(s) Oral daily  heparin   Injectable 5000 Unit(s) SubCutaneous every 8 hours  multivitamin 1 Tablet(s) Oral daily  polyethylene glycol 3350 17 Gram(s) Oral two times a day  senna 2 Tablet(s) Oral at bedtime  sodium chloride 0.9%. 1000 milliLiter(s) (250 mL/Hr) IV Continuous <Continuous>  tamsulosin 0.4 milliGRAM(s) Oral at bedtime  zinc sulfate 220 milliGRAM(s) Oral every 24 hours    MEDICATIONS  (PRN):      Allergies    No Known Allergies    Intolerances        LABS:                        10.2   12.78 )-----------( 268      ( 20 Jul 2022 12:19 )             30.9     07-20    144  |  109<H>  |  24<H>  ----------------------------<  125<H>  3.6   |  24  |  1.29    Ca    9.1      20 Jul 2022 12:19  Phos  3.8     07-19  Mg     2.2     07-19    TPro  6.8  /  Alb  3.9  /  TBili  0.6  /  DBili  x   /  AST  45<H>  /  ALT  19  /  AlkPhos  59  07-19          RADIOLOGY & ADDITIONAL TESTS:  Reviewed

## 2022-07-21 NOTE — PROGRESS NOTE ADULT - PROBLEM SELECTOR PLAN 8
F-1L in ED, PO intake, 1L NS 7/20  E-replete PRN  N-soft diet  DVT-hep sq  GI-none
F-1L in ED, PO intake, 1L NS 7/20  E-replete PRN  N-soft diet  DVT-hep sq  GI-none

## 2022-07-21 NOTE — CHART NOTE - NSCHARTNOTEFT_GEN_A_CORE
Discharge notice was given to Pt's wife at bedside. It was explained to the wife that the patient has been deemed medically ready at this time and that as she has refused [facility]IA, the patient would need to leave the hospital 7/22 by 4:20pm. She was advised of her rights to appeal by noon 7/22. The pt's representative refused to sign the discharge notice until she was able to speak to social work again, noted on form and notice placed in chart.

## 2022-07-21 NOTE — PROGRESS NOTE ADULT - ATTENDING COMMENTS
86yo M with a PMHx of Colon Ca (dx 1996 w/short course of chemo and partial colectomy), BPH who presented with complaints of constipation and urinary retention per wife    #Constipation - stool burden on CT but no colitis. s/p enema will repeat and continue aggressive bowel regimen  #Urinary retention - likely due to constipation, trial of void now that patient s/p BM  #Dementia - per wife baseline A&Ox1 and more interactive but decline over the past month.  Likely progressive dementia.  No signs of acute infection or process to cause worsening.  TSH wnl    Remainder of plan as above    Dispo: home vs [facility] pending BM and TOV
86yo M with a PMHx of Colon Ca (dx 1996 w/short course of chemo and partial colectomy), BPH who presented with complaints of constipation and urinary retention    #Constipation - stool burden on CT but no colitis. s/p enema x2 with significant improvement.  Resolved    #Orthostatic hypotension - BP 70s/50s with PT today, will give IV bolus and reassess orthostatics in AM, encourage PO intake  - Resolved s/p IV bolus yesterday    #Metabolic encephalopathy - patient A&Ox1 on admission and lethargic with improvement in his mental status s/p resolution of urinary retention.  No infectious source and cause likely due to urinary retention on top of baseline dementia.  returned to baseline. TSH wn    #Urinary retention - likely due to constipation, trial of void now that patient s/p BM.  Patient passed TOV but will monitor bladder scans q6 and trial straight cath over hammer  - Resolved    #DWIGHT - Cr 1.6 on admission with downtrend to 1.29, likely due to retention and poor PO intake    Remainder of plan as above    Dispo: patient medically stable for discharge home with home PT, patient recommended for [facility] but wife prefers him to be at home.  Discharge notice provided
84yo M with a PMHx of Colon Ca (dx 1996 w/short course of chemo and partial colectomy), BPH who presented with complaints of constipation and urinary retention per wife    #Constipation - stool burden on CT but no colitis. s/p enema x2 with significant improvement    #Orthostatic hypotension - BP 70s/50s with PT today, will give IV bolus and reassess orthostatics in AM, encourage PO intake    #Metabolic encephalopathy - patient A&Ox1 on admission and lethargic with improvement in his mental status s/p resolution of urinary retention.  No infectious source and cause likely due to urinary retention on top of baseline dementia.  returned to baseline. TSH wn    #Urinary retention - likely due to constipation, trial of void now that patient s/p BM.  Patient passed TOV but will monitor bladder scans q6 and trial straight cath over hammer    #DWIGHT - Cr 1.6 on admission with downtrend to 1.29 today, likely due to retention and poor PO intake    Remainder of plan as above    Dispo: home with home PT, patient recommended for [facility] but wife prefers him to be at home

## 2022-07-21 NOTE — PROGRESS NOTE ADULT - ASSESSMENT
per Internal Medicine     86yo M with a PMHx of Colon Ca (dx 1996 w/short course of chemo and partial colectomy), BPH not on any medical therapy who presented to the ED with wife complaining of constipation for 1 week associated with abd pain.       Problem/Plan - 1:  ·  Problem: Constipation.   ·  Plan: RESOLVED Presented with constipation for 1 week, s/p fleet enema in ED with pellet like stool. Imaging not concerning for obstruction. TSH wnl. Started on bowel regiment (miralax, senna, lactulose). Received tap enema, which relieved patient of constipation. Continued on miralax and senna.    - continue bowel regiment (miralax 2x daily, senna 2 tablets at bedtime)  - continue miralax at home  - patient is medically stable for d/c.    Problem/Plan - 2:  ·  Problem: DWIGHT (acute kidney injury).   ·  Plan: RESOLVED Unsure of baseline; however, presenting with elevated creatinine on admission. Hx of urinary retention, UA negative for infection. Could be 2/2 obstructive versus pre renal etiology. Cr 1.6 --> 1.29.    - PO intake, received 1L NS bolus yesterday 7/20  - avoid nephrotoxic agents.    Problem/Plan - 3:  ·  Problem: Urinary retention.   ·  Plan: RESOLVED Bladder scan in ED demonstrating 600cc of urine, s/p straight cath. Likely sequelae of constipation. Renal U/S negative for hydronephrosis. Hammer catheter was d/c and patient completed trial of void. Patient continues to urinate on his own.    - continue q6 bladder scans while inpatient to ensure patient is no longer retaining.    Problem/Plan - 4:  ·  Problem: Orthostatic hypotension.   ·  Plan: PT saw patient on 7/19 and recommended d/c to [facility]. Patient and his wife stated that they prefer to be d/c home w/ PT. When the wife came in, she said they cannot go home yet b/c patient has not been out of bed in 3 days. Requested that PT see patient again. When PT saw pt, had orthostatic hypotension. S/p 1L bolus NS 7/20.     - othostatics 7/21 do not meet criteria for orthostatic hypotension  - PT rec d/c to [facility].    Problem/Plan - 5:  ·  Problem: Dementia.   ·  Plan: A&Ox1 (oriented to self) is his baseline according to his wife. Placed on restraints on 7/18 after attempting to pull out hammer catheter. Patient appears to be back at his baseline on exam.    Problem/Plan - 6:  ·  Problem: Leukocytosis.   ·  Plan: Likely reactive given constipation and abd pain. No s/s of infection, Xray clear, UA negative, no fever, all other vital signs stable.    - ctm off antibiotics.    Problem/Plan - 7:  ·  Problem: Anemia.   ·  Plan: Presenting with Hg 11. Unsure of baseline. No s/s bleeding currently.    - will ctm, transfuse < 7, maintain active t&s.    Problem/Plan - 8:  ·  Problem: Prophylactic measure.   ·  Plan: F-1L in ED, PO intake, 1L NS 7/20  E-replete PRN  N-soft diet  DVT-hep sq  GI-none.  
Patient is an 86yo M with a PMHx of Colon Ca (dx 1996 w/short course of chemo and partial colectomy), BPH not on any medical therapy who presented to the ED with wife complaining of constipation for 1 week associated with abd pain. 

## 2022-07-21 NOTE — PROGRESS NOTE ADULT - PROBLEM SELECTOR PLAN 5
A&Ox1 (oriented to self) is his baseline according to his wife. Placed on restraints on 7/18 after attempting to pull out hammer catheter. Patient appears to be back at his baseline on exam.
A&Ox1 (oriented to self) is his baseline according to his wife. Placed on restraints on 7/18 after attempting to pull out hammer catheter. Patient appears to be back at his baseline on exam.
Likely reactive given constipation and abd pain. No s/s of infection, Xray clear, UA negative, no fever, all other vital signs stable.    - ctm off antibiotics

## 2022-07-21 NOTE — PROGRESS NOTE ADULT - SUBJECTIVE AND OBJECTIVE BOX
INTERVAL HPI/OVERNIGHT EVENTS:  Patient was seen and examined at bedside. As per nurse and patient, no o/n events, patient resting comfortably. Patient was not agitated overnight. No complaints at this time. Patient is at his baseline mental status, A&Ox1.    VITAL SIGNS:  T(F): 98.3 (07-21-22 @ 11:33)  HR: 65 (07-21-22 @ 05:53)  BP: 120/53 (07-21-22 @ 05:53)  RR: 18 (07-21-22 @ 11:33)  SpO2: 97% (07-21-22 @ 11:33)  Wt(kg): --        PHYSICAL EXAM:    Constitutional: resting comfortably in bed; NAD  HEENT: NC/AT, PER, anicteric sclera, no nasal discharge; MMM  Respiratory: CTA B/L; no W/R/R, no retractions  Cardiac: +S1/S2; RRR; no M/R/G  Gastrointestinal: soft, NT/ND; no rebound or guarding  Extremities: WWP, no clubbing or cyanosis; no peripheral edema  Musculoskeletal: NROM x4; no joint swelling, tenderness or erythema  Vascular: 2+ radial, DP/PT pulses B/L  Dermatologic: skin warm, dry and intact; no rashes, wounds, or scars  Neurologic: AAOx1; CNII-XII grossly intact; no focal deficits  Psychiatric: speech consistent w/ late stage dementia, babbling speech, pleasant affect    MEDICATIONS  (STANDING):  ascorbic acid 500 milliGRAM(s) Oral every 24 hours  aspirin  chewable 81 milliGRAM(s) Oral daily  heparin   Injectable 5000 Unit(s) SubCutaneous every 8 hours  multivitamin 1 Tablet(s) Oral daily  polyethylene glycol 3350 17 Gram(s) Oral two times a day  senna 2 Tablet(s) Oral at bedtime  tamsulosin 0.4 milliGRAM(s) Oral at bedtime  zinc sulfate 220 milliGRAM(s) Oral every 24 hours    MEDICATIONS  (PRN):      Allergies    No Known Allergies    Intolerances        LABS:                        10.2   12.78 )-----------( 268      ( 20 Jul 2022 12:19 )             30.9     07-20    144  |  109<H>  |  24<H>  ----------------------------<  125<H>  3.6   |  24  |  1.29    Ca    9.1      20 Jul 2022 12:19            RADIOLOGY & ADDITIONAL TESTS:  Reviewed

## 2022-07-21 NOTE — PROGRESS NOTE ADULT - NUTRITIONAL ASSESSMENT
This patient has been assessed with a concern for Malnutrition and has been determined to have a diagnosis/diagnoses of Severe protein-calorie malnutrition.    This patient is being managed with:   Diet Soft and Bite Sized-  Supplement Feeding Modality:  Oral  Ensure Clear Cans or Servings Per Day:  1       Frequency:  Three Times a day  Entered: Jul 19 2022  2:05PM    
This patient has been assessed with a concern for Malnutrition and has been determined to have a diagnosis/diagnoses of Severe protein-calorie malnutrition.    This patient is being managed with:   Diet Soft and Bite Sized-  Supplement Feeding Modality:  Oral  Ensure Enlive Cans or Servings Per Day:  3       Frequency:  Daily  Entered: Jul 20 2022 11:34AM    Diet Soft and Bite Sized-  Supplement Feeding Modality:  Oral  Ensure Clear Cans or Servings Per Day:  1       Frequency:  Three Times a day  Entered: Jul 19 2022  2:05PM    The following pending diet order is being considered for treatment of Severe protein-calorie malnutrition:null
This patient has been assessed with a concern for Malnutrition and has been determined to have a diagnosis/diagnoses of Severe protein-calorie malnutrition.    This patient is being managed with:   Diet Soft and Bite Sized-  Supplement Feeding Modality:  Oral  Ensure Enlive Cans or Servings Per Day:  3       Frequency:  Daily  Entered: Jul 20 2022 11:34AM    Diet Soft and Bite Sized-  Supplement Feeding Modality:  Oral  Ensure Clear Cans or Servings Per Day:  1       Frequency:  Three Times a day  Entered: Jul 19 2022  2:05PM    The following pending diet order is being considered for treatment of Severe protein-calorie malnutrition:null

## 2022-07-21 NOTE — PROGRESS NOTE ADULT - PROBLEM SELECTOR PLAN 4
PT saw patient on 7/19 and recommended d/c to [facility]. Patient and his wife stated that they prefer to be d/c home w/ PT. When the wife came in, she said they cannot go home yet b/c patient has not been out of bed in 3 days. Requested that PT see patient again. When PT saw pt, had orthostatic hypotension. S/p 1L bolus NS 7/20.     - othostatics 7/21 do not meet criteria for orthostatic hypotension  - PT rec d/c to [facility]
A&Ox1 (oriented to self) is his baseline according to his wife. Placed on restraints on 7/18 after attempting to pull out hammer catheter.     - obtain collateral from wife on patient's baseline  - d/c restraints today as patient is resting comfortably in bed and does not appear to be combative  - d/c to [facility] per PT recs
PT saw patient on 7/19 and recommended d/c to [facility]. Patient and his wife stated that they prefer to be d/c home w/ PT. When the wife came in today, she said they cannot go home yet b/c patient has not been out of bed in 3 days. Requested that PT see patient again. When PT saw pt, had orthostatic hypotension.    - 1L bolus NS  - PT rec d/c to [facility]

## 2022-07-21 NOTE — PROGRESS NOTE ADULT - SUBJECTIVE AND OBJECTIVE BOX
Physical Medicine and Rehabilitation Progress Note :    Patient is a 88y old  Male who presents with a chief complaint of constipation and urinary retention (21 Jul 2022 07:13)      HPI:  Patient is an 86yo M with a PMHx of Dementia, Colon Ca (dx 1996 w/short course of chemo and partial colectomy), BPH not on any medical therapy who presented to the ED with wife complaining of constipation for 1 week associated with abd pain. As per wife, patient has also not urinated since yesterday AM. As per wife, in the last month, has not been eating and drinking as much as normally, which has worsened in the last week. Currently patient without any acute complaints, denying any SOB, cough, fever, urinary symptoms. Endorsing abd pain.     In the ED  VSS  Labs WBC 12.5, Hg 11.9, BUN 34 Cr 1.6, UA negative  Imaging CT AP Study limited by motion artifact.  No acute intra-abdominal pathology visualized.  Nodular thickening ofthe left adrenal gland.  No evidence of obstrction.   Course Tylenol, Morphine 2mg x 2, Fleet enema, 1L Nacl   (18 Jul 2022 08:56)                            10.2   12.78 )-----------( 268      ( 20 Jul 2022 12:19 )             30.9       07-20    144  |  109<H>  |  24<H>  ----------------------------<  125<H>  3.6   |  24  |  1.29    Ca    9.1      20 Jul 2022 12:19      Vital Signs Last 24 Hrs  T(C): 36.8 (21 Jul 2022 11:33), Max: 37 (20 Jul 2022 21:57)  T(F): 98.3 (21 Jul 2022 11:33), Max: 98.6 (20 Jul 2022 21:57)  HR: 65 (21 Jul 2022 05:53) (63 - 81)  BP: 120/53 (21 Jul 2022 05:53) (105/61 - 126/59)  BP(mean): --  RR: 18 (21 Jul 2022 11:33) (18 - 18)  SpO2: 97% (21 Jul 2022 11:33) (96% - 97%)    Parameters below as of 21 Jul 2022 11:33  Patient On (Oxygen Delivery Method): room air        MEDICATIONS  (STANDING):  ascorbic acid 500 milliGRAM(s) Oral every 24 hours  aspirin  chewable 81 milliGRAM(s) Oral daily  heparin   Injectable 5000 Unit(s) SubCutaneous every 8 hours  multivitamin 1 Tablet(s) Oral daily  polyethylene glycol 3350 17 Gram(s) Oral two times a day  potassium chloride    Tablet ER 40 milliEquivalent(s) Oral once  senna 2 Tablet(s) Oral at bedtime  tamsulosin 0.4 milliGRAM(s) Oral at bedtime  zinc sulfate 220 milliGRAM(s) Oral every 24 hours    MEDICATIONS  (PRN):    Currently Undergoing Physical/ Occupational Therapy at bedside    PT/OT Functional Status Assessment :   7/20/2022      Cognitive/Neuro/Behavioral  Cognitive/Neuro/Behavioral [WDL Definition: Alert; opens eyes spontaneously; arouses to voice or touch; oriented x 4; follows commands; speech spontaneous, logical; purposeful motor response; behavior appropriate to situation]: WDL except  Level of Consciousness: confused  Arousal Level: arouses to voice  Orientation: disoriented x 4;  place;  time;  situation  Speech: incoherent;  illogical  Mood/Behavior: cooperative;  calm    Language Assistance  Preferred Language to Address Healthcare Preferred Language to Address Healthcare: English    Therapeutic Interventions      Bed Mobility  Bed Mobility Training Rehab Potential: good, to achieve stated therapy goals  Bed Mobility Training Sit-to-Supine: 1 person assist;  minimum assist (75% patient effort);  verbal cues  Bed Mobility Training Supine-to-Sit: verbal cues;  1 person assist;  minimum assist (75% patient effort)  Bed Mobility Training Limitations: decreased ability to use legs for bridging/pushing;  cognitive, decreased safety awareness;  impaired balance;  decreased strength    Sit-Stand Transfer Training  Sit-to-Stand Transfer Training Rehab Potential: good, to achieve stated therapy goals  Transfer Training Sit-to-Stand Transfer: minimum assist (75% patient effort);  1 person assist;  verbal cues;  supervision;  full weight-bearing   rolling walker  Transfer Training Stand-to-Sit Transfer: contact guard;  minimum assist (75% patient effort);  1 person assist;  supervision;  verbal cues;  full weight-bearing   rolling walker  Sit-to-Stand Transfer Training Transfer Safety Analysis: decreased balance;  decreased cognition;  decreased strength;  cognitive, decreased safety awareness;  rolling walker    Gait Training  Gait Training Rehab Potential: good, to achieve stated therapy goals  Gait Training Symptoms Noted During/After Treatment: dizziness  Gait Training: minimum assist (75% patient effort);  verbal cues;  nonverbal cues (demo/gestures);  supervision;  1 person assist;  full weight-bearing   rolling walker;  Pt. ambulated 3 steps at side of bed using RW;  Maximal verbal cueing for safety   Gait Analysis: 3-point gait   decreased indira;  decreased step length;  decreased stride length;  cognitive, decreased safety awareness;  impaired balance;  decreased strength;  Pt. ambulated 3 steps at side of bed using RW. ;  rolling walker          PM&R Impression : as above    Current Disposition Plan Recommendations :    subacute rehab placement

## 2022-07-21 NOTE — PROGRESS NOTE ADULT - PROBLEM SELECTOR PLAN 7
F-1L in ED, PO intake  E-replete PRN  N-soft diet  DVT-hep sq  GI-none
Presenting with Hg 11. Unsure of baseline. No s/s bleeding currently.    - will ctm, transfuse < 7, maintain active t&s
Presenting with Hg 11. Unsure of baseline. No s/s bleeding currently.    - will ctm, transfuse < 7, maintain active t&s

## 2022-07-21 NOTE — PROGRESS NOTE ADULT - PROBLEM SELECTOR PLAN 1
RESOLVED Presented with constipation for 1 week, s/p fleet enema in ED with pellet like stool. Imaging not concerning for obstruction. TSH wnl. Started on bowel regiment (miralax, senna, lactulose). Patient had BM this morning 7/19.     - continue bowel regiment (miralax 2x daily, senna 2 tablets at bedtime)  - continue miralax at home
RESOLVED Presented with constipation for 1 week, s/p fleet enema in ED with pellet like stool. Imaging not concerning for obstruction. TSH wnl. Started on bowel regiment (miralax, senna, lactulose). Received tap enema, which relieved patient of constipation. Continued on miralax and senna.    - continue bowel regiment (miralax 2x daily, senna 2 tablets at bedtime)  - continue miralax at home  - patient is medically stable for d/c
Presented with constipation for 1 week, s/p fleet enema in ED with pellet like stool. Imaging not concerning for obstruction. TSH wnl. Started on bowel regiment (miralax, senna, lactulose). Patient had BM this morning 7/19.     - continue bowel regiment (miralax 2x daily, senna 2 tablets at bedtime)  - d/c lactulose as patient had BM this morning

## 2022-07-21 NOTE — PROGRESS NOTE ADULT - PROBLEM SELECTOR PLAN 2
RESOLVED Unsure of baseline; however, presenting with elevated creatinine on admission. Hx of urinary retention, UA negative for infection. Could be 2/2 obstructive versus pre renal etiology. Cr 1.6 --> 1.29.    - PO intake, received 1L NS bolus yesterday 7/20  - avoid nephrotoxic agents
Unsure of baseline; however, presenting with elevated creatinine on admission. Hx of urinary retention, UA negative for infection. Could be 2/2 obstructive versus pre renal etiology. Cr 1.6 --> 1.29.    - follow urine lytes  - follow Cr  - avoid nephrotoxic agents
Unsure of baseline; however, presenting with elevated creatinine on admission. Hx of urinary retention, UA negative for infection. Could be 2/2 obstructive versus pre renal etiology.     - follow urine lytes  - follow Cr  - avoid nephrotoxic agents

## 2022-07-21 NOTE — PROGRESS NOTE ADULT - PROBLEM SELECTOR PLAN 3
RESOLVED Bladder scan in ED demonstrating 600cc of urine, s/p straight cath. Likely sequelae of constipation. Renal U/S negative for hydronephrosis. Graves catheter was d/c and patient completed trial of void. Patient continues to urinate on his own.    - continue q6 bladder scans while inpatient to ensure patient is no longer retaining
Bladder scan in ED demonstrating 600cc of urine, s/p straight cath. Likely sequelae of constipation. Renal U/S negative for hydronephrosis. Graves catheter was d/c and patient completed trial of void.    - continue q6 bladder scans while inpatient to ensure patient is no longer retaining
Bladder scan in ED demonstrating 600cc of urine, s/p straight cath. Likely sequelae of constipation. Renal U/S negative for hydronephrosis.    - d/c hammer catheter and trial of void as patient is now having BM  - bladder scan at 6pm to make sure patient is no longer retaining

## 2022-07-21 NOTE — PROGRESS NOTE ADULT - PROBLEM SELECTOR PLAN 6
Presenting with Hg 11. Unsure of baseline. No s/s bleeding currently.    - will ctm, transfuse < 7, maintain active t&s
Likely reactive given constipation and abd pain. No s/s of infection, Xray clear, UA negative, no fever, all other vital signs stable.    - ctm off antibiotics
Likely reactive given constipation and abd pain. No s/s of infection, Xray clear, UA negative, no fever, all other vital signs stable.    - ctm off antibiotics

## 2022-07-22 NOTE — DISCHARGE NOTE NURSING/CASE MANAGEMENT/SOCIAL WORK - NSDCPEFALRISK_GEN_ALL_CORE
For information on Fall & Injury Prevention, visit: [email or web address] OR  [email or web address] OR  https://www.cdc.gov/steadi/patient.html

## 2022-07-22 NOTE — DISCHARGE NOTE NURSING/CASE MANAGEMENT/SOCIAL WORK - PATIENT PORTAL LINK FT
You can access the FollowMyHealth Patient Portal offered by [facility] by registering at the following website: [email or web address]. By joining [facility]’s FollowMyHealth portal, you will also be able to view your health information using other applications (apps) compatible with our system.

## 2022-12-06 NOTE — H&P ADULT - HISTORY OF PRESENT ILLNESS
88M PMH dementia, colon cancer (dx 1996 w/ short course of chemo and partial colectomy), BPH, mild anemia p/w weakness. Pt unsure where he is or why he is here. Pt denies any complaints. Last admitted July 2022 for constipation, urinary retention. Per wife at bedside: Ever since last admission pt has been ambulating less - really only able to ambulate at home by holding onto things. Over the last week pt has increasing difficulty getting in and out of bed on his own, slowly worsening, finally prompting today's ED visit. Pt has "low BP" (wife unsure of number) for years. Also decreased appetite for many years. Not on any meds. Pt denies any pain - denies HA, CP, abd pain. Wife denies vomiting, coughing, black/bloody stool, diarrhea. Has HHA 6 hrs/day, 5 days a week    In the ED, VSS other than hypotension 109/63 (78) - at baseline per wife. Labs significant for borderline leukocytosis (WBC 10.6k), normocytic anemia (hgb 11, above baseline 10.3), DWIGHT (BUN/Cr 38/1.73, baseline possible Cr 1.29). CTH: Frontotemporal atrophy progressed since 2016. CXR: NAD. He received 0.5L 0.9%NS bolus.

## 2022-12-06 NOTE — H&P ADULT - NSHPLABSRESULTS_GEN_ALL_CORE
LABS:                         11.0   10.67 )-----------( 326      ( 06 Dec 2022 16:52 )             34.2     12-    139  |  103  |  38<H>  ----------------------------<  123<H>  5.0   |  27  |  1.73<H>    Ca    9.3      06 Dec 2022 16:52  Phos  3.2     12-  Mg     2.2     12-    TPro  7.1  /  Alb  3.9  /  TBili  0.2  /  DBili  x   /  AST  18  /  ALT  13  /  AlkPhos  72  12-06    PT/INR - ( 06 Dec 2022 16:52 )   PT: 13.0 sec;   INR: 1.09       PTT - ( 06 Dec 2022 16:52 )  PTT:26.3 sec  Urinalysis Basic - ( 06 Dec 2022 20:50 )    Color: Yellow / Appearance: SL Cloudy / S.020 / pH: x  Gluc: x / Ketone: NEGATIVE  / Bili: Negative / Urobili: 0.2 E.U./dL   Blood: x / Protein: NEGATIVE mg/dL / Nitrite: NEGATIVE   Leuk Esterase: NEGATIVE / RBC: x / WBC x   Sq Epi: x / Non Sq Epi: x / Bacteria: x    CARDIAC MARKERS ( 06 Dec 2022 16:52 )  x     / 0.01 ng/mL / 81 U/L / x     / 2.4 ng/mL    RADIOLOGY, EKG & ADDITIONAL TESTS:

## 2022-12-06 NOTE — H&P ADULT - PROBLEM SELECTOR PLAN 2
Hx urinary retention during July 2022 admission. This admission BUN/Cr 38/1.73, baseline Cr 1.29. s/p 0.5L 0.9%NS bolus.  - f/u Ulytes

## 2022-12-06 NOTE — PHYSICAL THERAPY INITIAL EVALUATION ADULT - IMPAIRMENTS FOUND, PT EVAL
aerobic capacity/endurance/gait, locomotion, and balance/muscle strength Previous Accession (Optional): [id number] Previous Accession (Optional): [id number]

## 2022-12-06 NOTE — H&P ADULT - NSHPPHYSICALEXAM_GEN_ALL_CORE
.  VITAL SIGNS:  T(C): 36.9 (12-06-22 @ 22:52), Max: 36.9 (12-06-22 @ 22:52)  T(F): 98.4 (12-06-22 @ 22:52), Max: 98.4 (12-06-22 @ 22:52)  HR: 60 (12-06-22 @ 22:52) (60 - 80)  BP: 111/57 (12-06-22 @ 22:52) (102/53 - 111/57)  BP(mean): 69 (12-06-22 @ 22:52) (69 - 69)  RR: 17 (12-06-22 @ 22:52) (17 - 18)  SpO2: 98% (12-06-22 @ 22:52) (95% - 98%)  Wt(kg): --    PHYSICAL EXAM:    Constitutional: WDWN resting comfortably in bed; NAD  Head: NC/AT  Eyes: PERRL, EOMI, clear conjunctiva  ENT: no nasal discharge; uvula midline, no oropharyngeal erythema or exudates; MMM  Neck: supple; no JVD or thyromegaly  Respiratory: CTA B/L; no W/R/R, no retractions  Cardiac: +S1/S2; RRR; no M/R/G;  Gastrointestinal: soft, NT/ND; no rebound or guarding; +BSx4  Extremities: WWP, no clubbing or cyanosis; no peripheral edema  Musculoskeletal: NROM x4; no joint swelling, tenderness or erythema  Vascular: 2+ radial, femoral, DP/PT pulses B/L  Dermatologic: skin warm, dry and intact; no rashes, wounds, or scars  Neurologic: AAOx3; CNII-XII grossly intact; no focal deficits  Psychiatric: affect and characteristics of appearance, verbalizations, behaviors are appropriate VITAL SIGNS:  T(C): 36.9 (12-06-22 @ 22:52), Max: 36.9 (12-06-22 @ 22:52)  T(F): 98.4 (12-06-22 @ 22:52), Max: 98.4 (12-06-22 @ 22:52)  HR: 60 (12-06-22 @ 22:52) (60 - 80)  BP: 111/57 (12-06-22 @ 22:52) (102/53 - 111/57)  BP(mean): 69 (12-06-22 @ 22:52) (69 - 69)  RR: 17 (12-06-22 @ 22:52) (17 - 18)  SpO2: 98% (12-06-22 @ 22:52) (95% - 98%)  Wt(kg): --    PHYSICAL EXAM:  Constitutional: resting comfortably in bed; NAD  Head: NC/AT  Eyes: PERRL, EOMI, clear conjunctiva  ENT: no nasal discharge; uvula midline, no oropharyngeal erythema or exudates; MMM  Neck: supple; no JVD or thyromegaly  Respiratory: CTA B/L; no W/R/R, no retractions  Cardiac: +S1/S2; RRR; no M/R/G;  Gastrointestinal: soft, NT/ND; no rebound or guarding; +BSx4  Extremities: WWP, no clubbing or cyanosis; no peripheral edema  Musculoskeletal: NROM x4; no joint swelling, tenderness or erythema  Vascular: 2+ radial, femoral, DP/PT pulses B/L  Dermatologic: skin warm, dry and intact; no rashes, wounds, or scars  Neurologic: AAOx0;  Psychiatric: unable to assess VITAL SIGNS:  T(C): 36.9 (12-06-22 @ 22:52), Max: 36.9 (12-06-22 @ 22:52)  T(F): 98.4 (12-06-22 @ 22:52), Max: 98.4 (12-06-22 @ 22:52)  HR: 60 (12-06-22 @ 22:52) (60 - 80)  BP: 111/57 (12-06-22 @ 22:52) (102/53 - 111/57)  BP(mean): 69 (12-06-22 @ 22:52) (69 - 69)  RR: 17 (12-06-22 @ 22:52) (17 - 18)  SpO2: 98% (12-06-22 @ 22:52) (95% - 98%)  Wt(kg): --    PHYSICAL EXAM:  Constitutional: resting comfortably in bed; NAD  Head: NC/AT  Eyes: PERRL, EOMI, clear conjunctiva  ENT: no nasal discharge; uvula midline, no oropharyngeal erythema or exudates; MMM  Neck: supple; no JVD or thyromegaly  Respiratory: CTA B/L; no W/R/R, no retractions  Cardiac: +S1/S2; RRR; no M/R/G;  Gastrointestinal: soft, NT/ND; no rebound or guarding; +BSx4  Extremities: WWP, no clubbing or cyanosis; no peripheral edema  Musculoskeletal: NROM x4; no joint swelling, tenderness or erythema  Vascular: 2+ radial, femoral, DP/PT pulses B/L  Dermatologic: skin warm, dry and intact; no rashes, wounds, or scars  Neurologic: AAOx1 (self);  Psychiatric: unable to assess

## 2022-12-06 NOTE — ED PROVIDER NOTE - CLINICAL SUMMARY MEDICAL DECISION MAKING FREE TEXT BOX
88M PMH dementia, colon cancer (dx 1996 w/ short course of chemo and partial colectomy), BPH, mild anemia p/w weakness. Pt unsure where he is or why he is here. Pt denies any complaints. Last admitted July 2022 for constipation, urinary retention.   Per wife at bedside: Ever since last admission pt has been ambulating less - really only able to ambulate at home by holding onto things. Over the last week pt has increasing difficulty getting in and out of bed on his own, slowly worsening, finally prompting today's ED visit. Pt has "low BP" (wife unsure of number) for years. Also decreased appetite for many years. Not on any meds.  Vitals wnl, exam as above.  ddx: Failure to thrive. Possible metabolic or infectious or intracranial component vs. related to worsening dementia.   Labs, CTH, EKG, CXR, UA, reassess.

## 2022-12-06 NOTE — H&P ADULT - PROBLEM SELECTOR PLAN 4
Last admitted July 2022 for constipation, urinary retention.  - started bowel regimen (Senna & Miralax)

## 2022-12-06 NOTE — ED PROVIDER NOTE - PHYSICAL EXAMINATION
no LE edema, normal equal distal pulses  A and O to person - does not recognize wife (though wife states that sometimes he does recognize her but jokes that he doesn't) or where he is or why he is here. Pleasant.  No spinal ttp, neck FROM. Strength 5/5. No bony ttp, FROM all extremities.

## 2022-12-06 NOTE — H&P ADULT - ASSESSMENT
88M PMH dementia, colon cancer (dx 1996 w/ short course of chemo and partial colectomy), BPH, mild anemia p/w weakness. Pt unsure where he is or why he is here. Pt denies any complaints. Last admitted July 2022 for constipation, urinary retention. Per wife at bedside: Ever since last admission pt has been ambulating less - really only able to ambulate at home by holding onto things. Over the last week pt has increasing difficulty getting in and out of bed on his own, slowly worsening, finally prompting today's ED visit. Pt has "low BP" (wife unsure of number) for years. Also decreased appetite for many years. Not on any meds. Pt denies any pain - denies HA, CP, abd pain. Wife denies vomiting, coughing, black/bloody stool, diarrhea. Has HHA 6 hrs/day, 5 days a week    In the ED, VSS other than hypotension 109/63 (78) - at baseline per wife. Labs significant for borderline leukocytosis (WBC 10.6k), normocytic anemia (hgb 11, above baseline 10.3), DWIGHT (BUN/Cr 38/1.73, baseline possible Cr 1.29). CTH: Frontotemporal atrophy progressed since 2016. CXR: NAD. He received 0.5L 0.9%NS bolus.   88M PMH dementia, colon cancer (dx 1996 w/ short course of chemo and partial colectomy), BPH, mild anemia p/w weakness. Admitted for decreased ambulation requiring assistance.

## 2022-12-06 NOTE — PHYSICAL THERAPY INITIAL EVALUATION ADULT - NSACTIVITYREC_GEN_A_PT
- patient will perform bed mobility independently in 1 week.  - patient will perform sit to stand transfer with CG and RW in 1 week.  - patient will ambulate 50ft with RW and CG in 1 week.

## 2022-12-06 NOTE — H&P ADULT - PROBLEM SELECTOR PLAN 6
F: None   E: Replete as necessary K>4 Mg>2  N: pending dysphagia screen    DVT Prophylaxis: Lovenox 40mg daily   GI prophylaxis: None   CODE STATUS: FULL

## 2022-12-06 NOTE — ED PROVIDER NOTE - PROGRESS NOTE DETAILS
[place]: Hgb 11, Cr 1.73 (unknown baseline), other labs grossly wnl. COVID neg. CTH w/ no acute pathology. CXR w/ no acute pathology. Pt intermittently sleeping (wife also notes increasing sleeping throughout the day). Pt remains clinically stable while in ED for >6hrs. PT evaluated, recommending MIA. Updated wife. Will admit for further care.

## 2022-12-06 NOTE — PHYSICAL THERAPY INITIAL EVALUATION ADULT - GAIT DEVIATIONS NOTED, PT EVAL
patient with mildly unsteady gait, no LOB, gait distance limited by impaired cognition and poor command following/decreased indira/decreased step length

## 2022-12-06 NOTE — ED ADULT NURSE NOTE - OBJECTIVE STATEMENT
87yo male at baseline A&Ox1, c/o generalized weakness and declining orientation for the past 6 months. no signs of pain or discomfort.

## 2022-12-06 NOTE — ED ADULT NURSE REASSESSMENT NOTE - NS ED NURSE REASSESS COMMENT FT1
Patient care endorsed from prior shift, patient is resting on the stretcher. Pt was bib wife for generalized weakness. Patient straight cath'd for urine studies which patient tolerated well, ~100 ml of clear yellow urine return. Patient and wife, who is at bedside made aware of pending urine results.

## 2022-12-06 NOTE — ED PROVIDER NOTE - ST/T WAVE
had ST changes on prior EKG April 2019 that may have been indicative of brugada - not present on current EKG

## 2022-12-06 NOTE — ED PROVIDER NOTE - OBJECTIVE STATEMENT
88M PMH dementia, colon cancer (dx 1996 w/ short course of chemo and partial colectomy), BPH, mild anemia p/w weakness. Pt unsure where he is or why he is here. Pt denies any complaints. Last admitted July 2022 for constipation, urinary retention.   Per wife at bedside: Ever since last admission pt has been ambulating less - really only able to ambulate at home by holding onto things. Over the last week pt has increasing difficulty getting in and out of bed on his own, slowly worsening, finally prompting today's ED visit. Pt has "low BP" (wife unsure of number) for years. Also decreased appetite for many years. Not on any meds.  Pt denies any pain - denies HA, CP, abd pain. Wife denies vomiting, coughing, black/bloody stool, diarrhea.   PMD  Dr. Hull.  Has HHA 6 hrs/day, 5 days a week.

## 2022-12-06 NOTE — ED ADULT TRIAGE NOTE - CHIEF COMPLAINT QUOTE
Pt presents to ED c/o generalized weakness. Per wife, pt a&ox1 at baseline. Been gradually declining over past 6 months, worsening this weekend. +generalized weakness, increased fatigue, low BP at home.

## 2022-12-06 NOTE — H&P ADULT - ATTENDING COMMENTS
#Weakness: hx of advanced dementia, AO x1 at baseline, presents w/ worsening ADLs, generalized weakness. On exam, pleasantly confused, AOx1, moving all extremities. CTH w/ frontotemporal atrophy, worsening. No FND, no s/s of infection. Ekg nsr, non ischemic. UA negative. F/up orthostatic, tsh, PT recommends tigre. Palliative consult. Fall and aspiration precautions.    #Dementia   #DWIGHT: likely prerenal. s/p fluids. F/up Urine lytes, bladder scan, moitor urine output.

## 2022-12-06 NOTE — PHYSICAL THERAPY INITIAL EVALUATION ADULT - ADDITIONAL COMMENTS
Social history obtained from patient's wife. Patient lives with his wife in apartment with 2 flights and 4 [address]RA[address]. PTA patient ambulated without AD (however reports furniture walking). Patient has HHA 5 days/week for 7 hours. Patient owns RW.

## 2022-12-06 NOTE — H&P ADULT - PROBLEM SELECTOR PLAN 1
Hx Dementia. CTH: Frontotemporal atrophy progressed since 2016. Per wife, decreased ambulation since last admission now requiring assistance, prompting admission.  - PT eval  - Consider Palliative consult

## 2022-12-06 NOTE — H&P ADULT - NSHPREVIEWOFSYSTEMS_GEN_ALL_CORE
Constitutional: [ ] fevers [ ] chills [ ] fatigue [ ] malaise [ ] myalgias [ ] arthralgias [ ] weight loss  Eyes: [ ] double vision [ ] eye pain  [ ] visual changes  ENT: [ ] sore throat [ ] odynophagia [ ] mouth pain [ ] rhinorrhea  CV: [ ] chest pain [ ] shortness of breath  [ ] edema  Respiratory:  [ ] cough [ ] sputum production [ ] wheezing [ ] shortness of breath  GI: [ ] abdominal pain [ ] nausea [ ] vomiting [ ]  constipation [ ] diarrhea  : [ ] suprapubic pain [ ] dysuria [ ] polyuria [ ] penile/vaginal discharge [ ] genital lesions  Heme/Lymph: [ ] easy bleeding [ ] swollen lymph nodes  Skin: [ ] rashes [ ] skin lesions  Neuro: [ ] headache [ ] neck tenderness [ ] focal motor weakness [ ] sensory changes [ ] paresthesias

## 2022-12-07 NOTE — CONSULT NOTE ADULT - ASSESSMENT
88 year old man with debility, Weakness, dementia, advance care planning and encounter for palliative care.

## 2022-12-07 NOTE — PROGRESS NOTE ADULT - ATTENDING COMMENTS
88M PMH dementia, colon cancer (dx 1996 w/ short course of chemo and partial colectomy), BPH, mild anemia p/w weakness. Admitted for DWIGHT and ambulatory dysfunction     #dwight  likely pre-renal  s/p IVF   cr improved from 1.73--1.39  will cw gentle hydration and repeat bmp in am   avoid nephrotoxins     #dementia   #failed dysphagia screen  pending S/S   will continue to encourage oral intake after s/s eval     #functional quadriplegia/ ambulatory dys  PT recs MIA     #dvt ppx HSQ

## 2022-12-07 NOTE — PROGRESS NOTE ADULT - PROBLEM SELECTOR PLAN 2
Hx urinary retention during July 2022 admission. This admission BUN/Cr 38/1.73, baseline Cr 1.29. s/p 0.5L 0.9%NS bolus.    Plan:   - i/s/o poor PO intake  - f/u Ulytes  - S&S evaluation for PO hydration, gentle fluids for now

## 2022-12-07 NOTE — PROGRESS NOTE ADULT - ASSESSMENT
88M PMH dementia, colon cancer (dx 1996 w/ short course of chemo and partial colectomy), BPH, mild anemia p/w weakness. Admitted for decreased ambulation requiring assistance and DWIGHT.

## 2022-12-07 NOTE — CONSULT NOTE ADULT - ASSESSMENT
per Internal Medicine    88 y o M PMH dementia, colon cancer (dx 1996 w/ short course of chemo and partial colectomy), BPH, mild anemia p/w weakness. Admitted for decreased ambulation requiring assistance.    Problem/Plan - 1:  ·  Problem: Functional weakness.   ·  Plan: Hx Dementia. CTH: Frontotemporal atrophy progressed since 2016. Per wife, decreased ambulation since last admission now requiring assistance, prompting admission.  - PT eval  - Consider Palliative consult.    Problem/Plan - 2:  ·  Problem: DWIGHT (acute kidney injury).   ·  Plan: Hx urinary retention during July 2022 admission. This admission BUN/Cr 38/1.73, baseline Cr 1.29. s/p 0.5L 0.9%NS bolus.  - f/u Ulytes.    Problem/Plan - 3:  ·  Problem: Anemia.   ·  Plan: normocytic anemia (hgb 11, above baseline 10.3).  - Active T+S, transfuse if hgb<7.    Problem/Plan - 4:  ·  Problem: Constipation.   ·  Plan: Last admitted July 2022 for constipation, urinary retention.  - started bowel regimen (Senna & Miralax).    Problem/Plan - 5:  ·  Problem: BPH (benign prostatic hyperplasia).   ·  Plan: - c/w home Tamsulosin 0.4mg qhs.    Problem/Plan - 6:  ·  Problem: Healthcare maintenance.   ·  Plan: F: None   E: Replete as necessary K>4 Mg>2  N: pending dysphagia screen    DVT Prophylaxis: Lovenox 40mg daily   GI prophylaxis: None   CODE STATUS: FULL.

## 2022-12-07 NOTE — CONSULT NOTE ADULT - TIME BILLING
Emotional Support/Supportive Care and Clarification of Potential Disease Trajectory related to Dementia  Exploration of GOC including advanced directives such as code status

## 2022-12-07 NOTE — SWALLOW BEDSIDE ASSESSMENT ADULT - SWALLOW EVAL: ORAL MUSCULATURE
Suspect functional strength needed for speech/feeding/unable to assess due to poor participation/comprehension

## 2022-12-07 NOTE — CONSULT NOTE ADULT - CONVERSATION DETAILS
In addition to the EM visit, an advance care planning meeting was performed  Start time:1200pm  End time: 1216pm  Total time: 16 minutes  A face to face meeting to discuss advance care planning was held today regarding: [employer]  Primary decision maker:  Patient is unable to make decisions for himself  Alternate/surrogate: Inna Potter  Discussed advance directives including, but not limited to, healthcare proxy and code status.  Decision regarding code status: Full code   Documentation completed today: Non    Introduced advance care planning to the patients wife as she is his health care proxy. She stated that she really has never though about it, but does not want him to suffer and was hoping when it is his time he would be able to just pass peacefully in his sleep. DNR/DNI was introduced as allowing a natural death but patients wife stated that she will make this decision in real time and does not want to discuss it at this time.

## 2022-12-07 NOTE — CONSULT NOTE ADULT - PROBLEM SELECTOR RECOMMENDATION 2
Per patients wife, patient has been having a continued decline at home, every since his last hospitalization. She stated that his appetite has been good and he drinks 3-4 ensures a day, but must be chocolate.  Is considering MIA at this time. Continue care as per primary team.

## 2022-12-07 NOTE — CONSULT NOTE ADULT - PROBLEM SELECTOR RECOMMENDATION 3
Patient with known Dementia. Current Fast score of 5. Current albumin 3.9. Does not qualify for hospice at this time, continue care as per primary team. Patient disease progression was explained to the wife and she was understanding.

## 2022-12-07 NOTE — SWALLOW BEDSIDE ASSESSMENT ADULT - SLP GENERAL OBSERVATIONS
Pt was received in bed, w eyes closed, restless and appearing jittery in bed w wife (Inna) at bedside, who attributed this to "having worked himself up [because of his] socks." RN came to bedside and agreed that pt appeared different than earlier this morning. She immediately completed evaluation w vitals, which were WNL. Pt then appeared to calm and this evaluation was able to be completed. Again- he presented w mostly nonsensical speech and was not able to answer questions/follow directives, although his wife insisted that he "understood." She reported that at baseline, pt was on a regular diet and able to feed himself at times,  but most frequently required to be fed.

## 2022-12-07 NOTE — CONSULT NOTE ADULT - SUBJECTIVE AND OBJECTIVE BOX
RAMSES LOPES          MRN-[id number]            (1934)    HPI:  88M PMH dementia, colon cancer (dx  w/ short course of chemo and partial colectomy), BPH, mild anemia p/w weakness. Pt unsure where he is or why he is here. Pt denies any complaints. Last admitted 2022 for constipation, urinary retention. Per wife at bedside: Ever since last admission pt has been ambulating less - really only able to ambulate at home by holding onto things. Over the last week pt has increasing difficulty getting in and out of bed on his own, slowly worsening, finally prompting today's ED visit. Pt has "low BP" (wife unsure of number) for years. Also decreased appetite for many years. Not on any meds. Pt denies any pain - denies HA, CP, abd pain. Wife denies vomiting, coughing, black/bloody stool, diarrhea. Has HHA 6 hrs/day, 5 days a week    In the ED, VSS other than hypotension 109/63 (78) - at baseline per wife. Labs significant for borderline leukocytosis (WBC 10.6k), normocytic anemia (hgb 11, above baseline 10.3), DWIGHT (BUN/Cr 38/1.73, baseline possible Cr 1.29). CTH: Frontotemporal atrophy progressed since . CXR: NAD. He received 0.5L 0.9%NS bolus. (06 Dec 2022 22:49)      PAST MEDICAL & SURGICAL HISTORY:  Sepsis  2/2 UTI- 2013      Colon cancer      BPH (benign prostatic hyperplasia)      Brain TIA      S/P colon resection      FAMILY HISTORY:  FH: cancer  mother    FH: myocardial infarction  father    SOCIAL HISTORY:   Decreased ambulation since last admission, requiring assistance. Has HHA 6 hrs/day, 5 days a week.      ROS:    Unable to attain due to:  cognitive impairment                    Dyspnea (Deb 0-10):  0                      N/V (Y/N):                            N  Secretions (Y/N) :              N  Agitation(Y/N): N  Pain (Y/N):     N  -Provocation/Palliation: n/a  -Quality/Quantity: n/a   -Radiating: n/a   -Severity: n/a   -Timing/Frequency: n/a   -Impact on ADLs: n/a     General:  unable to obtain   HEENT:   unable to obtain   Neck:  unable to obtain   CVS: unable to obtain   Resp:  unable to obtain   GI:  unable to obtain   :  unable to obtain   Musc:  unable to obtain   Neuro: unable to obtain   Psych: unable to obtain   Skin:  unable to obtain   Lymph:  unable to obtain     ALLERGIES:  Allergies    No Known Allergies    Intolerances        Opiate Naive (Y/N):  Y  -iStop reviewed (Y/N): Y (Ref#:     [id number]          )    MEDICATIONS:      MEDICATIONS  (STANDING):  ascorbic acid 500 milliGRAM(s) Oral daily  aspirin enteric coated 81 milliGRAM(s) Oral daily  dextrose 5% + lactated ringers. 1000 milliLiter(s) (90 mL/Hr) IV Continuous <Continuous>  heparin   Injectable 5000 Unit(s) SubCutaneous every 8 hours  multivitamin 1 Tablet(s) Oral daily  polyethylene glycol 3350 17 Gram(s) Oral daily  tamsulosin 0.4 milliGRAM(s) Oral at bedtime  zinc sulfate 220 milliGRAM(s) Oral every 24 hours    MEDICATIONS  (PRN):    LABS:    CBC:                        11.3   7.45  )-----------( 351      ( 07 Dec 2022 05:30 )             34.9     CMP:    12-    143  |  106  |  34<H>  ----------------------------<  91  4.8   |  29  |  1.39<H>    Ca    9.5      07 Dec 2022 05:30  Phos  3.3     12-  Mg     2.5     12-    TPro  7.1  /  Alb  3.9  /  TBili  0.2  /  DBili  x   /  AST  18  /  ALT  13  /  AlkPhos  72  12-06    PT/INR - ( 06 Dec 2022 16:52 )   PT: 13.0 sec;   INR: 1.09       PTT - ( 06 Dec 2022 16:52 )  PTT:26.3 sec  Urinalysis Basic - ( 06 Dec 2022 20:50 )    Color: Yellow / Appearance: SL Cloudy / S.020 / pH: x  Gluc: x / Ketone: NEGATIVE  / Bili: Negative / Urobili: 0.2 E.U./dL   Blood: x / Protein: NEGATIVE mg/dL / Nitrite: NEGATIVE   Leuk Esterase: NEGATIVE / RBC: x / WBC x   Sq Epi: x / Non Sq Epi: x / Bacteria: x      IMAGING:   < from: Xray Chest 1 View- PORTABLE-Routine (Xray Chest 1 View- PORTABLE-Routine .) (22 @ 16:48) >  ACC: [id number] EXAM:  XR CHEST PORTABLE ROUTINE 1V                          PROCEDURE DATE:  2022      IMPRESSION: No acute cardiopulmonary disease process.    < end of copied text >    PHYSICAL EXAM:  T(C): 36.4 (22 @ 06:08), Max: 36.9 (22 @ 22:52)  HR: 73 (22 @ 06:08) (60 - 80)  BP: 132/69 (22 @ 06:08) (102/53 - 132/69)  RR: 18 (22 @ 06:08) (17 - 18)  SpO2: 98% (22 @ 06:08) (95% - 98%)  Wt(kg): 71.2kg  Daily Height in cm: 182.88 (06 Dec 2022 15:08)    Daily   CAPILLARY BLOOD GLUCOSE    I&O's Summary    06 Dec 2022 07:01  -  07 Dec 2022 07:00  --------------------------------------------------------  IN: 0 mL / OUT: 100 mL / NET: -100 mL      GENERAL:  [x ]Alert  [x ]Oriented x 1   [ ]Lethargic due to sedation  [ ]Cachexia [x ]Verbal  [ ]Non-Verbal  Behavioral:   [ ] Anxiety  [ ] Delirium [ ] Agitation [ x] Other - calm   HEENT:  [x ]Normal   [ ]Dry mouth   [ ]ET Tube  [ ]Oral lesions  PULMONARY:   [x ]Clear  [ ]Tachypnea  [ ]Audible excessive secretions   [ ]Rhonchi     [ ]Right [ ]Left [ ]Bilateral  [ ]Crackles        [ ]Right [ ]Left [ ]Bilateral  [ ]Wheezing     [ ]Right [ ]Left [ ]Bilateral  CARDIOVASCULAR:    [x ]Regular [ ]Irregular [ ]Tachy  [ ][place] [ ]Murmur [ ]Other  GASTROINTESTINAL:  [x ]Soft  [ ]Distended   [ ]+BS  [x ]Non tender [ ]Tender  [ ]PEG [ ]OGT/NGT  [] flexiseal with output  Last BM:   GENITOURINARY:  [ ]Normal [x ] Incontinent   [ ]Oliguria/Anuria   [ ]Graves  MUSCULOSKELETAL:   [ ]Normal   [ x]Weakness  [ ]Bed/Wheelchair bound [ ]Edema  NEUROLOGIC:   [ ]No focal deficits  [x ] Cognitive impairment  [ ] Dysphagia [ ]Dysarthria [ ] Paresis [  ]Other   SKIN:   [x ]Normal   [ ]Pressure ulcer(s)  [ ]Rash     Preadmit Karnofsky: 70 %           Current Karnofsky:   60  %  Cachexia (Y/N): N  BMI: 21.3kg/m2    ADVANCED DIRECTIVES:     Full Code     HCP form found on patien window under 12/3/2013 admit legacy page  listing Inna Jimenez [phone]     No Living will / POA / Advance directives found on [facility].     No documented GOC notes on [facility]    DECISION MAKER: The patient is able to participate in symptomatic assessment but may not be able to have complex medical decision  LEGAL SURROGATE: HCP in patient window   Alternate surrogate:  Inna Jimenez [phone]    GOALS OF CARE DISCUSSION:       Palliative care info/counseling provided	           Family meeting       Advanced Directives addressed please see Advance Care Planning Note	           See previous Palliative Medicine Note       Documentation of GOC: 	Full code           REFERRALS:	        Unit SW/Case Mgmt       PT/OT

## 2022-12-07 NOTE — SWALLOW BEDSIDE ASSESSMENT ADULT - ADDITIONAL RECOMMENDATIONS
1) Recommend establishing long-term feeding goals given (1) progressive nature of dementia, and (2) pt's difficulty accepting PO from unfamiliar feeders. 1) Recommend establishing long-term feeding goals given (1) progressive nature of dementia, and (2) pt's difficulty accepting PO from unfamiliar feeders.  2) Reconsult PRN

## 2022-12-07 NOTE — CONSULT NOTE ADULT - SUBJECTIVE AND OBJECTIVE BOX
Patient is a 88y old  Male who presents with a chief complaint of Dementia, FTT, Weakness (07 Dec 2022 06:35)        HPI:  88M PMH dementia, colon cancer (dx  w/ short course of chemo and partial colectomy), BPH, mild anemia p/w weakness. Pt unsure where he is or why he is here. Pt denies any complaints. Last admitted 2022 for constipation, urinary retention. Per wife at bedside: Ever since last admission pt has been ambulating less - really only able to ambulate at home by holding onto things. Over the last week pt has increasing difficulty getting in and out of bed on his own, slowly worsening, finally prompting today's ED visit. Pt has "low BP" (wife unsure of number) for years. Also decreased appetite for many years. Not on any meds. Pt denies any pain - denies HA, CP, abd pain. Wife denies vomiting, coughing, black/bloody stool, diarrhea. Has HHA 6 hrs/day, 5 days a week    In the ED, VSS other than hypotension 109/63 (78) - at baseline per wife. Labs significant for borderline leukocytosis (WBC 10.6k), normocytic anemia (hgb 11, above baseline 10.3), DWIGHT (BUN/Cr 38/1.73, baseline possible Cr 1.29). CTH: Frontotemporal atrophy progressed since . CXR: NAD. He received 0.5L 0.9%NS bolus. (06 Dec 2022 22:49)      PAST MEDICAL & SURGICAL HISTORY:  Sepsis  2/2 UTI- 2013      Colon cancer      BPH (benign prostatic hyperplasia)      Brain TIA      S/P colon resection          MEDICATIONS  (STANDING):  ascorbic acid 500 milliGRAM(s) Oral daily  aspirin enteric coated 81 milliGRAM(s) Oral daily  multivitamin 1 Tablet(s) Oral daily  polyethylene glycol 3350 17 Gram(s) Oral daily  tamsulosin 0.4 milliGRAM(s) Oral at bedtime  zinc sulfate 220 milliGRAM(s) Oral every 24 hours    MEDICATIONS  (PRN):           FAMILY HISTORY:  FH: cancer  mother    FH: myocardial infarction  father      CBC Full  -  ( 06 Dec 2022 16:52 )  WBC Count : 10.67 K/uL  RBC Count : 3.56 M/uL  Hemoglobin : 11.0 g/dL  Hematocrit : 34.2 %  Platelet Count - Automated : 326 K/uL  Mean Cell Volume : 96.1 fl  Mean Cell Hemoglobin : 30.9 pg  Mean Cell Hemoglobin Concentration : 32.2 gm/dL  Auto Neutrophil # : 7.51 K/uL  Auto Lymphocyte # : 1.75 K/uL  Auto Monocyte # : 1.07 K/uL  Auto Eosinophil # : 0.19 K/uL  Auto Basophil # : 0.09 K/uL  Auto Neutrophil % : 70.4 %  Auto Lymphocyte % : 16.4 %  Auto Monocyte % : 10.0 %  Auto Eosinophil % : 1.8 %  Auto Basophil % : 0.8 %          143  |  106  |  34<H>  ----------------------------<  91  4.8   |  29  |  1.39<H>    Ca    9.5      07 Dec 2022 05:30  Phos  3.3       Mg     2.5         TPro  7.1  /  Alb  3.9  /  TBili  0.2  /  DBili  x   /  AST  18  /  ALT  13  /  AlkPhos  72        Urinalysis Basic - ( 06 Dec 2022 20:50 )    Color: Yellow / Appearance: SL Cloudy / S.020 / pH: x  Gluc: x / Ketone: NEGATIVE  / Bili: Negative / Urobili: 0.2 E.U./dL   Blood: x / Protein: NEGATIVE mg/dL / Nitrite: NEGATIVE   Leuk Esterase: NEGATIVE / RBC: x / WBC x   Sq Epi: x / Non Sq Epi: x / Bacteria: x          Radiology :     < from: Xray Chest 1 View- PORTABLE-Routine (Xray Chest 1 View- PORTABLE-Routine .) (22 @ 16:48) >  ACC: [id number] EXAM:  XR CHEST PORTABLE ROUTINE 1V                          PROCEDURE DATE:  2022          INTERPRETATION:  TECHNIQUE: Single portable view of the chest.    COMPARISON:  2022    CLINICAL HISTORY: AMS    FINDINGS:    Singlefrontal view of the chest demonstrates the lungs to be clear. The   cardiomediastinal silhouette is normal. No acute osseous abnormalities.    IMPRESSION: No acute cardiopulmonary disease process.        < from: CT Head No Cont (22 @ 19:05) >  ACC: [id number] EXAM:  CT BRAIN                          PROCEDURE DATE:  2022          INTERPRETATION:  PROCEDURE: CT head without intravenous contrast    CLINICAL INDICATION: Dementia, worsening generalized weakness    TECHNIQUE: Axial CT imaging of the brain is obtained with multiplanar   images reviewed and displayed with both bone and soft tissue algorithm.    COMPARISON: 2016    FINDINGS:    There is frontal and temporal lobe predominant cerebral atrophy,   progressed from 2016.Sulcal and ventricular enlargement is, however,   proportionate without evidence of hydrocephalus.    No acute intracranial hemorrhage or extra-axial collection. No mass   effect or midline shift. Gray-white matter differentiation appears   preservedwithout evidence of recent infarct.    Bone window images no fracture or other acute finding. Mastoids and   included paranasal sinuses are clear.      IMPRESSION:    No acute intracranial pathology. There is frontotemporal atrophy   progressed [id number].                  Vital Signs Last 24 Hrs  T(C): 36.4 (07 Dec 2022 06:08), Max: 36.9 (06 Dec 2022 22:52)  T(F): 97.5 (07 Dec 2022 06:08), Max: 98.4 (06 Dec 2022 22:52)  HR: 73 (07 Dec 2022 06:08) (60 - 80)  BP: 132/69 (07 Dec 2022 06:08) (102/53 - 132/69)  BP(mean): 69 (06 Dec 2022 22:52) (69 - 69)  RR: 18 (07 Dec 2022 06:08) (17 - 18)  SpO2: 98% (07 Dec 2022 06:08) (95% - 98%)    Parameters below as of 07 Dec 2022 06:08  Patient On (Oxygen Delivery Method): room air            REVIEW OF SYSTEMS:  pre hpi         Physical Exam:  frail 88 y o man lying comfortably in semi Singh's position , awake , alert , confused , no current complaints      Neurologic Exam:    Alert and oriented to person ,  speech fluent w/o dysarthria , follows commands       Cranial Nerves:     II :                         pupils equal , round and reactive to light , visual fields intact   III/ IV/VI :              extraocular movements intact , neg nystagmus , neg ptosis  V :                        facial sensation intact , V1-3 normal  VII :                      face symmetric , no droop , normal eye closure and smile  VIII :                     hearing intact to finger rub bilaterally  IX and X :             no hoarseness , gag intact , palate/ uvula rise symmetrically  XI :                       SCM / trapezius strength intact bilateral  XII :                      no tongue deviation    Motor Exam:          Right UE:               no focal weakness ,  > 3+/5 throughout  , no drift                                 Left UE:                 no focal weakness,  > 3+/5 throughout  , no drift          Right LE:                no focal weakness,  > 3+/5 throughout       Left LE:                  no focal weakness,  > 3+/5 throughout            Sensation:         intact to light touch x 4 extremities                         DTR :                     biceps/brachioradialis : equal                                              patella/ankle : equal                                                                               neg Babinski        Gait :  not tested        PM&R Impression :     1) deconditioned    2) no focal weakness      Recommendations / Plan :     1) Physical / Occupational therapy focusing on therapeutic exercises , equipment evaluation , bed mobility/transfer out of bed evaluation , progressive ambulation with assistive devices prn .    2) Current disposition plan recommendation  :   MIA

## 2022-12-07 NOTE — CONSULT NOTE ADULT - PROBLEM SELECTOR RECOMMENDATION 5
Patient remains full code, please see [facility] note above for details. Patient does not qualify for hospice at this time.   As discussed during the palliative IDT meeting, the patients PSSA screening did not identify any current psychosocial need or spiritual support deficits.  - Anabaptism/Spiritual practice: unknown  - Coping: [ ] well [ ] with difficulty [ ] poor coping  [x] unable to obtain   - Support system: [x ] strong [ ] adequate [ ] inadequate  - All questions answered, emotional support provided  - dw primary team   - Please contact Palliative Medicine 24/7 at [phone] for any acute symptoms or further questions  - Will continue to follow with you Patient remains full code, please see [facility] note above for details. Patient does not qualify for hospice at this time.   As discussed during the palliative IDT meeting, the patients PSSA screening did not identify any current psychosocial need or spiritual support deficits.  - Anabaptist/Spiritual practice: unknown  - Coping: [ ] well [ ] with difficulty [ ] poor coping  [x] unable to obtain   - Support system: [x ] strong [ ] adequate [ ] inadequate  - All questions answered, emotional support provided  -  primary team   - Please contact Palliative Medicine 24/7 at [phone] for any acute symptoms or further questions  Palliative care will sign off at this time. Please reconsult as needed.

## 2022-12-07 NOTE — PROGRESS NOTE ADULT - SUBJECTIVE AND OBJECTIVE BOX
O/N Events: naeo    Subjective/ROS: Patient seen and examined at bedside, in no pain or acute distress. Very apparent word finding difficulties. 12 pt ROS somewhat limited but patient denies pain, SOB, burning on urination.     VITALS  Vital Signs Last 24 Hrs  T(C): 36.4 (07 Dec 2022 06:08), Max: 36.9 (06 Dec 2022 22:52)  T(F): 97.5 (07 Dec 2022 06:08), Max: 98.4 (06 Dec 2022 22:52)  HR: 73 (07 Dec 2022 06:08) (60 - 80)  BP: 132/69 (07 Dec 2022 06:08) (102/53 - 132/69)  BP(mean): 69 (06 Dec 2022 22:52) (69 - 69)  RR: 18 (07 Dec 2022 06:08) (17 - 18)  SpO2: 98% (07 Dec 2022 06:08) (95% - 98%)    Parameters below as of 07 Dec 2022 06:08  Patient On (Oxygen Delivery Method): room air        CAPILLARY BLOOD GLUCOSE          PHYSICAL EXAM  General: NAD, pleasant   Head: NC/AT; MMM; PERRL; EOMI;  Neck: Supple; no JVD  Respiratory: CTAB; no wheezes/rales/rhonchi  Cardiovascular: Regular rhythm/rate; S1/S2+, no murmurs, rubs gallops   Gastrointestinal: Soft; NTND; bowel sounds normal and present  Extremities: WWP; no edema/cyanosis  Neurological: A&Ox3, CNII-XII grossly intact; no obvious focal deficits    MEDICATIONS  (STANDING):  ascorbic acid 500 milliGRAM(s) Oral daily  aspirin enteric coated 81 milliGRAM(s) Oral daily  dextrose 5% + lactated ringers. 1000 milliLiter(s) (90 mL/Hr) IV Continuous <Continuous>  heparin   Injectable 5000 Unit(s) SubCutaneous every 8 hours  multivitamin 1 Tablet(s) Oral daily  polyethylene glycol 3350 17 Gram(s) Oral daily  tamsulosin 0.4 milliGRAM(s) Oral at bedtime  zinc sulfate 220 milliGRAM(s) Oral every 24 hours    MEDICATIONS  (PRN):      No Known Allergies      LABS                        11.3   7.45  )-----------( 351      ( 07 Dec 2022 05:30 )             34.9     12-07    143  |  106  |  34<H>  ----------------------------<  91  4.8   |  29  |  1.39<H>    Ca    9.5      07 Dec 2022 05:30  Phos  3.3       Mg     2.5         TPro  7.1  /  Alb  3.9  /  TBili  0.2  /  DBili  x   /  AST  18  /  ALT  13  /  AlkPhos  72      PT/INR - ( 06 Dec 2022 16:52 )   PT: 13.0 sec;   INR: 1.09          PTT - ( 06 Dec 2022 16:52 )  PTT:26.3 sec  Urinalysis Basic - ( 06 Dec 2022 20:50 )    Color: Yellow / Appearance: SL Cloudy / S.020 / pH: x  Gluc: x / Ketone: NEGATIVE  / Bili: Negative / Urobili: 0.2 E.U./dL   Blood: x / Protein: NEGATIVE mg/dL / Nitrite: NEGATIVE   Leuk Esterase: NEGATIVE / RBC: x / WBC x   Sq Epi: x / Non Sq Epi: x / Bacteria: x      CARDIAC MARKERS ( 06 Dec 2022 16:52 )  x     / 0.01 ng/mL / 81 U/L / x     / 2.4 ng/mL        Culture - Urine (collected 22 @ 20:50)  Source: Clean Catch Clean Catch (Midstream)  Preliminary Report (22 @ 08:52):    No growth to date        IMAGING/EKG/ETC   O/N Events: naeo    Subjective/ROS: Patient seen and examined at bedside, in no pain or acute distress. Very apparent word finding difficulties. 12 pt ROS somewhat limited but patient denies pain, SOB, burning on urination.     VITALS  Vital Signs Last 24 Hrs  T(C): 36.4 (07 Dec 2022 06:08), Max: 36.9 (06 Dec 2022 22:52)  T(F): 97.5 (07 Dec 2022 06:08), Max: 98.4 (06 Dec 2022 22:52)  HR: 73 (07 Dec 2022 06:08) (60 - 80)  BP: 132/69 (07 Dec 2022 06:08) (102/53 - 132/69)  BP(mean): 69 (06 Dec 2022 22:52) (69 - 69)  RR: 18 (07 Dec 2022 06:08) (17 - 18)  SpO2: 98% (07 Dec 2022 06:08) (95% - 98%)    Parameters below as of 07 Dec 2022 06:08  Patient On (Oxygen Delivery Method): room air        CAPILLARY BLOOD GLUCOSE          PHYSICAL EXAM  General: NAD, pleasant   Head: NC/AT; MMM; PERRL; EOMI;  Neck: Supple; no JVD  Respiratory: CTAB; no wheezes/rales/rhonchi  Cardiovascular: Regular rhythm/rate; S1/S2+, no murmurs, rubs gallops   Gastrointestinal: Soft; NTND; bowel sounds normal and present  Extremities: WWP; no edema/cyanosis  Neurological: A&Ox1,clear word finding difficuluties, CNII-XII grossly intact; no obvious focal deficits    MEDICATIONS  (STANDING):  ascorbic acid 500 milliGRAM(s) Oral daily  aspirin enteric coated 81 milliGRAM(s) Oral daily  dextrose 5% + lactated ringers. 1000 milliLiter(s) (90 mL/Hr) IV Continuous <Continuous>  heparin   Injectable 5000 Unit(s) SubCutaneous every 8 hours  multivitamin 1 Tablet(s) Oral daily  polyethylene glycol 3350 17 Gram(s) Oral daily  tamsulosin 0.4 milliGRAM(s) Oral at bedtime  zinc sulfate 220 milliGRAM(s) Oral every 24 hours    MEDICATIONS  (PRN):      No Known Allergies      LABS                        11.3   7.45  )-----------( 351      ( 07 Dec 2022 05:30 )             34.9     12    143  |  106  |  34<H>  ----------------------------<  91  4.8   |  29  |  1.39<H>    Ca    9.5      07 Dec 2022 05:30  Phos  3.3     12  Mg     2.5         TPro  7.1  /  Alb  3.9  /  TBili  0.2  /  DBili  x   /  AST  18  /  ALT  13  /  AlkPhos  72  12-    PT/INR - ( 06 Dec 2022 16:52 )   PT: 13.0 sec;   INR: 1.09          PTT - ( 06 Dec 2022 16:52 )  PTT:26.3 sec  Urinalysis Basic - ( 06 Dec 2022 20:50 )    Color: Yellow / Appearance: SL Cloudy / S.020 / pH: x  Gluc: x / Ketone: NEGATIVE  / Bili: Negative / Urobili: 0.2 E.U./dL   Blood: x / Protein: NEGATIVE mg/dL / Nitrite: NEGATIVE   Leuk Esterase: NEGATIVE / RBC: x / WBC x   Sq Epi: x / Non Sq Epi: x / Bacteria: x      CARDIAC MARKERS ( 06 Dec 2022 16:52 )  x     / 0.01 ng/mL / 81 U/L / x     / 2.4 ng/mL        Culture - Urine (collected 22 @ 20:50)  Source: Clean Catch Clean Catch (Midstream)  Preliminary Report (22 @ 08:52):    No growth to date        IMAGING/EKG/ETC

## 2022-12-07 NOTE — SWALLOW BEDSIDE ASSESSMENT ADULT - SPECIFY REASON(S)
To assess for safest, least restrictive PO diet To re-assess for safest, least restrictive PO diet as request of primary team

## 2022-12-08 NOTE — PROVIDER CONTACT NOTE (CHANGE IN STATUS NOTIFICATION) - ASSESSMENT
Temp=102.5 F orally, HR=98, RR=18, GI=791/53, and PCA was unable to obtain O2 sat. Skin hot to touch. Patient is confused and combative at baseline.

## 2022-12-08 NOTE — CONSULT NOTE ADULT - SUBJECTIVE AND OBJECTIVE BOX
Patient is a 88y old  Male who presents with a chief complaint of Dementia, FTT, Weakness (08 Dec 2022 06:21)      Consult reason:  ED vitals: T   HR   RR  BP  SpO2  Labs signficant:  Imaging/EKG:   Interventions:    HPI:  88M PMH dementia, colon cancer (dx  w/ short course of chemo and partial colectomy), BPH, mild anemia p/w weakness. Pt unsure where he is or why he is here. Pt denies any complaints. Last admitted 2022 for constipation, urinary retention. Per wife at bedside: Ever since last admission pt has been ambulating less - really only able to ambulate at home by holding onto things. Over the last week pt has increasing difficulty getting in and out of bed on his own, slowly worsening, finally prompting today's ED visit. Pt has "low BP" (wife unsure of number) for years. Also decreased appetite for many years. Not on any meds. Pt denies any pain - denies HA, CP, abd pain. Wife denies vomiting, coughing, black/bloody stool, diarrhea. Has HHA 6 hrs/day, 5 days a week    In the ED, VSS other than hypotension 109/63 (78) - at baseline per wife. Labs significant for borderline leukocytosis (WBC 10.6k), normocytic anemia (hgb 11, above baseline 10.3), DWIGHT (BUN/Cr 38/1.73, baseline possible Cr 1.29). CTH: Frontotemporal atrophy progressed since . CXR: NAD. He received 0.5L 0.9%NS bolus. (06 Dec 2022 22:49)      Allergies    No Known Allergies    Intolerances      Home Medications:  ascorbic acid 500 mg oral tablet: 1 tab(s) orally every 24 hours (2022 11:32)  aspirin 81 mg oral delayed release tablet: 1 tab(s) orally once a day (2016 16:20)  Multiple Vitamins oral tablet: 1 tab(s) orally once a day (03 Oct 2016 13:29)  zinc sulfate 220 mg oral capsule: 1 cap(s) orally every 24 hours (2022 11:32)      SOCIAL HX:     Smoking          ETOH/Illicit drugs          Occupation    PAST MEDICAL & SURGICAL HISTORY:  Sepsis  2/2 UTI- 2013      Colon cancer      BPH (benign prostatic hyperplasia)      Brain TIA      S/P colon resection          FAMILY HISTORY:  FH: cancer  mother    FH: myocardial infarction  father    :    No known cardiovascular or pulmonary family history     ROS:  See HPI     PHYSICAL EXAM    ICU Vital Signs Last 24 Hrs  T(C): 38.3 (08 Dec 2022 06:40), Max: 39.2 (08 Dec 2022 04:55)  T(F): 101 (08 Dec 2022 06:40), Max: 102.5 (08 Dec 2022 04:55)  HR: 85 (08 Dec 2022 06:40) (69 - 98)  BP: 104/48 (08 Dec 2022 06:40) (104/48 - 151/65)  BP(mean): --  ABP: --  ABP(mean): --  RR: 18 (08 Dec 2022 06:40) (18 - 18)  SpO2: 95% (08 Dec 2022 06:40) (92% - 98%)    O2 Parameters below as of 08 Dec 2022 06:40  Patient On (Oxygen Delivery Method): room air            General: Not in distress  HEENT:  GRISELDA              Lymphatic system: No LN  Lungs: Bilateral BS  Cardiovascular: Regular  Gastrointestinal: Soft, Positive BS  Musculoskeletal: No clubbing.  Moves all extremities.    Skin: Warm.  Intact  Neurological: No motor or sensory deficit       22 @ 07:01  -  22 @ 07:00  --------------------------------------------------------  IN:  Total IN: 0 mL    OUT:    Voided (mL): 400 mL  Total OUT: 400 mL    Total NET: -400 mL          LABS:                          11.0   17.93 )-----------( 362      ( 08 Dec 2022 08:14 )             33.4                                               12-    142  |  106  |  34<H>  ----------------------------<  120<H>  4.2   |  25  |  1.51<H>    Ca    9.4      08 Dec 2022 08:14  Phos  3.1     12-08  Mg     1.9     12-08    TPro  7.1  /  Alb  3.9  /  TBili  0.2  /  DBili  x   /  AST  18  /  ALT  13  /  AlkPhos  72  12-06      PT/INR - ( 06 Dec 2022 16:52 )   PT: 13.0 sec;   INR: 1.09          PTT - ( 06 Dec 2022 16:52 )  PTT:26.3 sec                                       Urinalysis Basic - ( 06 Dec 2022 20:50 )    Color: Yellow / Appearance: SL Cloudy / S.020 / pH: x  Gluc: x / Ketone: NEGATIVE  / Bili: Negative / Urobili: 0.2 E.U./dL   Blood: x / Protein: NEGATIVE mg/dL / Nitrite: NEGATIVE   Leuk Esterase: NEGATIVE / RBC: x / WBC x   Sq Epi: x / Non Sq Epi: x / Bacteria: x        CARDIAC MARKERS ( 06 Dec 2022 16:52 )  x     / 0.01 ng/mL / 81 U/L / x     / 2.4 ng/mL                                            LIVER FUNCTIONS - ( 06 Dec 2022 16:52 )  Alb: 3.9 g/dL / Pro: 7.1 g/dL / ALK PHOS: 72 U/L / ALT: 13 U/L / AST: 18 U/L / GGT: x                                                  Culture - Urine (collected 06 Dec 2022 20:50)  Source: Clean Catch Clean Catch (Midstream)  Final Report (08 Dec 2022 10:01):    No growth                                                                                           CXR:    ECHO:    MEDICATIONS  (STANDING):  aspirin enteric coated 81 milliGRAM(s) Oral daily  heparin   Injectable 5000 Unit(s) SubCutaneous every 8 hours  piperacillin/tazobactam IVPB.- 3.375 Gram(s) IV Intermittent once  piperacillin/tazobactam IVPB.. 3.375 Gram(s) IV Intermittent every 8 hours  polyethylene glycol 3350 17 Gram(s) Oral daily  sodium chloride 0.9% Bolus 1000 milliLiter(s) IV Bolus once  tamsulosin 0.4 milliGRAM(s) Oral at bedtime  vancomycin  IVPB 1000 milliGRAM(s) IV Intermittent every 24 hours    MEDICATIONS  (PRN):  acetaminophen     Tablet .. 650 milliGRAM(s) Oral every 6 hours PRN Temp greater or equal to 38C (100.4F)         Patient is a 88y old  Male who presents with a chief complaint of Dementia, FTT, Weakness (08 Dec 2022 06:21)      Consult reason:  ED vitals: T 101F  HR 87  BP70/40->106/60 s/p 1L LR  RR 16  95% SpO2 RA  Labs signficant: WBC 17, Lactate 1.3 (drawn prior to completion of 1L LR), Cr 1.53  Imaging/EKG: CXR  Interventions: 1L LR, Tylenol 650mg, Vanc 1g, Zosyn 3.375    HPI:  88M PMH dementia, colon cancer (dx  w/ short course of chemo and partial colectomy), BPH, mild anemia p/w weakness. Pt unsure where he is or why he is here. Pt denies any complaints. Last admitted 2022 for constipation, urinary retention. Per wife at bedside: Ever since last admission pt has been ambulating less - really only able to ambulate at home by holding onto things. Over the last week pt has increasing difficulty getting in and out of bed on his own, slowly worsening, finally prompting today's ED visit. Pt has "low BP" (wife unsure of number) for years. Also decreased appetite for many years. Not on any meds. Pt denies any pain - denies HA, CP, abd pain. Wife denies vomiting, coughing, black/bloody stool, diarrhea. Has HHA 6 hrs/day, 5 days a week    In the ED, VSS other than hypotension 109/63 (78) - at baseline per wife. Labs significant for borderline leukocytosis (WBC 10.6k), normocytic anemia (hgb 11, above baseline 10.3), DWIGHT (BUN/Cr 38/1.73, baseline possible Cr 1.29). CTH: Frontotemporal atrophy progressed since . CXR: NAD. He received 0.5L 0.9%NS bolus. (06 Dec 2022 22:49)      Allergies    No Known Allergies    Intolerances      Home Medications:  ascorbic acid 500 mg oral tablet: 1 tab(s) orally every 24 hours (2022 11:32)  aspirin 81 mg oral delayed release tablet: 1 tab(s) orally once a day (2016 16:20)  Multiple Vitamins oral tablet: 1 tab(s) orally once a day (03 Oct 2016 13:29)  zinc sulfate 220 mg oral capsule: 1 cap(s) orally every 24 hours (2022 11:32)      SOCIAL HX:     Smoking          ETOH/Illicit drugs          Occupation    PAST MEDICAL & SURGICAL HISTORY:  Sepsis  / UTI- 2013      Colon cancer      BPH (benign prostatic hyperplasia)      Brain TIA      S/P colon resection          FAMILY HISTORY:  FH: cancer  mother    FH: myocardial infarction  father    :    No known cardiovascular or pulmonary family history     ROS:  See HPI     PHYSICAL EXAM    ICU Vital Signs Last 24 Hrs  T(C): 38.3 (08 Dec 2022 06:40), Max: 39.2 (08 Dec 2022 04:55)  T(F): 101 (08 Dec 2022 06:40), Max: 102.5 (08 Dec 2022 04:55)  HR: 85 (08 Dec 2022 06:40) (69 - 98)  BP: 104/48 (08 Dec 2022 06:40) (104/48 - 151/65)  BP(mean): --  ABP: --  ABP(mean): --  RR: 18 (08 Dec 2022 06:40) (18 - 18)  SpO2: 95% (08 Dec 2022 06:40) (92% - 98%)    O2 Parameters below as of 08 Dec 2022 06:40  Patient On (Oxygen Delivery Method): room air            General: agitated in bed  HEENT:  GRISELDA              Lymphatic system: No LN  Lungs: pt poor participant rales b/l in lower lung fields   Cardiovascular: S1, S2 RRR  Gastrointestinal: Soft, Positive BS  Musculoskeletal: R CVA tenderness > L CVA, No clubbing.  Moves all extremities.    Skin: Warm.  Intact, no breaks in skin observed  Neurological: No motor or sensory deficit, AOx1 (person), word finding difficulty noted      22 @ 07:01  -  22 @ 07:00  --------------------------------------------------------  IN:  Total IN: 0 mL    OUT:    Voided (mL): 400 mL  Total OUT: 400 mL    Total NET: -400 mL          LABS:                          11.0   17.93 )-----------( 362      ( 08 Dec 2022 08:14 )             33.4                                                   142  |  106  |  34<H>  ----------------------------<  120<H>  4.2   |  25  |  1.51<H>    Ca    9.4      08 Dec 2022 08:14  Phos  3.1     12  Mg     1.9         TPro  7.1  /  Alb  3.9  /  TBili  0.2  /  DBili  x   /  AST  18  /  ALT  13  /  AlkPhos  72  12      PT/INR - ( 06 Dec 2022 16:52 )   PT: 13.0 sec;   INR: 1.09          PTT - ( 06 Dec 2022 16:52 )  PTT:26.3 sec                                       Urinalysis Basic - ( 06 Dec 2022 20:50 )    Color: Yellow / Appearance: SL Cloudy / S.020 / pH: x  Gluc: x / Ketone: NEGATIVE  / Bili: Negative / Urobili: 0.2 E.U./dL   Blood: x / Protein: NEGATIVE mg/dL / Nitrite: NEGATIVE   Leuk Esterase: NEGATIVE / RBC: x / WBC x   Sq Epi: x / Non Sq Epi: x / Bacteria: x        CARDIAC MARKERS ( 06 Dec 2022 16:52 )  x     / 0.01 ng/mL / 81 U/L / x     / 2.4 ng/mL                                            LIVER FUNCTIONS - ( 06 Dec 2022 16:52 )  Alb: 3.9 g/dL / Pro: 7.1 g/dL / ALK PHOS: 72 U/L / ALT: 13 U/L / AST: 18 U/L / GGT: x                                                  Culture - Urine (collected 06 Dec 2022 20:50)  Source: Clean Catch Clean Catch (Midstream)  Final Report (08 Dec 2022 10:01):    No growth                                                                                           CXR:    ECHO:    MEDICATIONS  (STANDING):  aspirin enteric coated 81 milliGRAM(s) Oral daily  heparin   Injectable 5000 Unit(s) SubCutaneous every 8 hours  piperacillin/tazobactam IVPB.- 3.375 Gram(s) IV Intermittent once  piperacillin/tazobactam IVPB.. 3.375 Gram(s) IV Intermittent every 8 hours  polyethylene glycol 3350 17 Gram(s) Oral daily  sodium chloride 0.9% Bolus 1000 milliLiter(s) IV Bolus once  tamsulosin 0.4 milliGRAM(s) Oral at bedtime  vancomycin  IVPB 1000 milliGRAM(s) IV Intermittent every 24 hours    MEDICATIONS  (PRN):  acetaminophen     Tablet .. 650 milliGRAM(s) Oral every 6 hours PRN Temp greater or equal to 38C (100.4F)

## 2022-12-08 NOTE — PROGRESS NOTE ADULT - ATTENDING COMMENTS
88M PMH dementia, colon cancer (dx 1996 w/ short course of chemo and partial colectomy), BPH, mild anemia p/w weakness. Admitted for DWIGHT and ambulatory dysfunction       #sepsis 2/2 suspected ASP pna  #acute metabolic encephalopathy    met 3/4 sirs with WBC > 17k , 102.5 and sbp 70/48   s/p 2 L ivf, s/p vanc and zosyn   lactate 1.3   fu blood cultures   will cw zosyn for now   SBp improved and mental status at bs     #dwight-> ATN   likely pre-renal  s/p IVF   cr improved from 1.73--1.39-- increased to 1.5 today   ATN 2/.2 hypotension   will cw gentle hydration and repeat bmp now   avoid nephrotoxins     #dementia   #failed dysphagia screen  s/s rec soft diet 12/7 --> however mental status noted to be worse in am,   will keep NPO and get re-eval   will continue to encourage oral intake after s/s eval     #functional quadriplegia/ ambulatory dysfunction   PT recs MIA     #dvt ppx HSQ

## 2022-12-08 NOTE — DIETITIAN INITIAL EVALUATION ADULT - OTHER CALCULATIONS
%IBW: 88; ABW used to calculate estimated needs d/t pt being between 80 and 120%IBW. Adjusted for advanced age, PCM.

## 2022-12-08 NOTE — PROGRESS NOTE ADULT - SUBJECTIVE AND OBJECTIVE BOX
O/N Events: Patient with fever overnight    Subjective/ROS: Patient seen and examined at bedside, found to be very lethargic with a shift from baseline. Patient is A&Ox0-1 at baseline but alert and interactive, today patient not opening eyes to sternal rub. Vitals were taken, found to be septic, given 2L and put on broad spectrum antibiotics. Patient became much more awake with fluids and was able to express he was feeling better. In no acute pain or distress. 12 pt ROS otherwise limited.     VITALS  Vital Signs Last 24 Hrs  T(C): 37.9 (08 Dec 2022 13:48), Max: 39.2 (08 Dec 2022 04:55)  T(F): 100.3 (08 Dec 2022 13:48), Max: 102.5 (08 Dec 2022 04:55)  HR: 65 (08 Dec 2022 16:43) (65 - 98)  BP: 118/76 (08 Dec 2022 16:43) (70/48 - 151/65)  BP(mean): 88 (08 Dec 2022 16:43) (50 - 88)  RR: 16 (08 Dec 2022 16:43) (15 - 18)  SpO2: 99% (08 Dec 2022 16:43) (94% - 99%)    Parameters below as of 08 Dec 2022 16:43  Patient On (Oxygen Delivery Method): room air        CAPILLARY BLOOD GLUCOSE          PHYSICAL EXAM  General: Lethargic, minimally responsive to sternal rub --> s/p intervention --> A&Ox1 interactive   Head: NC/AT; MMM; PERRL; EOMI  Neck: Supple; no JVD  Respiratory: CTAB; no wheezes/rales/rhonchi  Cardiovascular: Regular rhythm/rate; S1/S2+, no murmurs, rubs gallops   Gastrointestinal: Soft; NTND; bowel sounds normal and present  Extremities: WWP; no edema/cyanosis  Neurological: A&Ox3, CNII-XII grossly intact; no obvious focal deficits    MEDICATIONS  (STANDING):  aspirin enteric coated 81 milliGRAM(s) Oral daily  heparin   Injectable 5000 Unit(s) SubCutaneous every 8 hours  piperacillin/tazobactam IVPB.. 3.375 Gram(s) IV Intermittent every 8 hours  polyethylene glycol 3350 17 Gram(s) Oral daily  tamsulosin 0.4 milliGRAM(s) Oral at bedtime    MEDICATIONS  (PRN):  acetaminophen     Tablet .. 650 milliGRAM(s) Oral every 6 hours PRN Temp greater or equal to 38C (100.4F)      No Known Allergies      LABS                        11.0   17.93 )-----------( 362      ( 08 Dec 2022 08:14 )             33.4     12    142  |  106  |  34<H>  ----------------------------<  120<H>  4.2   |  25  |  1.51<H>    Ca    9.4      08 Dec 2022 08:14  Phos  3.1     12  Mg     1.9     08        Urinalysis Basic - ( 08 Dec 2022 13:49 )    Color: Yellow / Appearance: Clear / S.025 / pH: x  Gluc: x / Ketone: NEGATIVE  / Bili: Negative / Urobili: 0.2 E.U./dL   Blood: x / Protein: 30 mg/dL / Nitrite: POSITIVE   Leuk Esterase: Large / RBC: > 10 /HPF / WBC Many /HPF   Sq Epi: x / Non Sq Epi: 0-5 /HPF / Bacteria: Many /HPF            Urinalysis with Rflx Culture (collected 22 @ 13:49)        IMAGING/EKG/ETC

## 2022-12-08 NOTE — DIETITIAN INITIAL EVALUATION ADULT - OTHER INFO
88M PMH dementia, colon cancer (dx 1996 w/ short course of chemo and partial colectomy), BPH, mild anemia p/w weakness. Admitted for decreased ambulation requiring assistance and DWIGHT.     Pt seen at bedside for initial assessment. Resident at bedside during assessment. Assessment limited d/t pt's worsened clinical and mental status. Pt was unarousable and MD noted ICU was consulted. Information obtained from team, IDRs, EMR. NO nausea/vomiting at this time. last bowel movement 12/7.  Unable to obtain wt or appetite history due to pt's mental status. Per H&P, pt with chronic poor appetite for years, suspect PO intake <75% EER >1mo. Per [facility] records, pt weighed 155lbs in July '22. CBW is 157, noting a 2lb wt gain, insignificant.  No edema documented at this time. No pressure ulcers documented at this time. Naveen score=13. Nutrition focused physical exam significant for moderate temporal, orbital, clavicle and buccal wasting. Based on ASPEN guidelines, pt meets criteria for malnutrition at this time. Education deferred. Of note, SLP recommended soft & bite sized diet as of 12/7. However, after decline in mental status overnight, pt is NPO.  Made team aware RD remains available. RD to follow up. See nutrition recommendations below.

## 2022-12-08 NOTE — DIETITIAN INITIAL EVALUATION ADULT - CONTINUE CURRENT NUTRITION CARE PLAN
Advance diet to most liberal diet recommended per SLP. Align nutrition interventions with GOC at all times./yes

## 2022-12-08 NOTE — DIETITIAN INITIAL EVALUATION ADULT - ADD RECOMMEND
John Spring), Gastroenterology; Internal Medicine  [address]  [place] [address]  Phone: [phone]  Fax: [phone]    Chandra Almonte), Cardiovascular Disease; Internal Medicine  [address]  [place] [address]  Phone: [phone]  Fax: [phone]
1. Advance diet to regular diet, texture per SLP/team (so long as this plan is medically feasible and within GOC)  >>If pt is ordered a PO diet, recommend ensure enlive TID (1050kcal, 60g pro). Assist pt with meals as necessary and encourage adequate PO intake.   2. Should alternative nutrition therapies be warranted and is within GOC, please reconsult nutrition for recommendations .   3. Monitor lytes, replete prn. Monitor BG.   4. Trend weekly wts. Monitor skin integrity, s/sx GI distress.   5. Pain/BM regimen per team   6. RD diet edu prn.   7. RD will obtain appetite/wt history collateral as able  8. RD to remain available for additional nutrition interventions as needed.   9. Align nutrition interventions with GOC at all times.

## 2022-12-08 NOTE — DIETITIAN INITIAL EVALUATION ADULT - PERTINENT MEDS FT
MEDICATIONS  (STANDING):  aspirin enteric coated 81 milliGRAM(s) Oral daily  heparin   Injectable 5000 Unit(s) SubCutaneous every 8 hours  piperacillin/tazobactam IVPB.. 3.375 Gram(s) IV Intermittent every 8 hours  polyethylene glycol 3350 17 Gram(s) Oral daily  tamsulosin 0.4 milliGRAM(s) Oral at bedtime    MEDICATIONS  (PRN):  acetaminophen     Tablet .. 650 milliGRAM(s) Oral every 6 hours PRN Temp greater or equal to 38C (100.4F)

## 2022-12-08 NOTE — DIETITIAN NUTRITION RISK NOTIFICATION - TREATMENT: THE FOLLOWING DIET HAS BEEN RECOMMENDED
Diet, NPO (12-08-22 @ 09:20) [Active]      **please see  RD note from 12/7 for full assessment and recommendations.   Tomasa Gonzales MS, RDN, CDN  Diet, NPO (12-08-22 @ 09:20) [Active]      **please see  RD note from 12/8 for full assessment and recommendations.   Tomasa Gonzales MS, RDN, CDN

## 2022-12-08 NOTE — DIETITIAN INITIAL EVALUATION ADULT - PERTINENT LABORATORY DATA
12-08    137  |  106  |  28<H>  ----------------------------<  109<H>  4.2   |  17<L>  |  1.49<H>    Ca    8.6      08 Dec 2022 17:45  Phos  3.1     12-08  Mg     1.9     12-08    TPro  6.4  /  Alb  3.3  /  TBili  1.2  /  DBili  x   /  AST  25  /  ALT  14  /  AlkPhos  66  12-08

## 2022-12-08 NOTE — CONSULT NOTE ADULT - ASSESSMENT
#Sepsis  Source asp pna as pt recently had speech and swallow eval was not cleared, less likely pyelo as pt has R CVA tenderness but UA WNL, no skin breakages on exam to suggest skin and soft tissue, lactate 1.3   - please obtain 2 sets BCx for new Tmax, MRSA/MSSA PCR, urine strep, urine legionella  - please fluid recussitate 300cc/kg as pt has not prior cardiac hx       #Sepsis  Source asp pna as pt recently had speech and swallow eval was not cleared, less likely pyelo as pt has R CVA tenderness but UA WNL, no skin breakages on exam to suggest skin and soft tissue, lactate 1.3   - please obtain 2 sets BCx for new Tmax, MRSA/MSSA PCR, urine strep, urine legionella, RVP with influenza and covid  - please fluid recussitate 300cc/kg as pt has not prior cardiac hx

## 2022-12-08 NOTE — PROGRESS NOTE ADULT - PROBLEM SELECTOR PLAN 2
Hx Dementia. CTH: Frontotemporal atrophy progressed since 2016. Per wife, decreased ambulation since last admission now requiring assistance, prompting admission. Holding off further evaluation for now i/s/o infection,     Plan:  - PT eval - recommending MIA, will need to have GOC discussion with wife  - Consider Palliative consult

## 2022-12-08 NOTE — PROVIDER CONTACT NOTE (MEDICATION) - SITUATION
Provider made aware that patient refused night meds because she is experiencing N/V.  Provider also made aware that patient is requesting pain medication and medication to sleep.

## 2022-12-09 NOTE — PROGRESS NOTE ADULT - ASSESSMENT
88M PMH dementia, colon cancer (dx 1996 w/ short course of chemo and partial colectomy), BPH, mild anemia p/w weakness. Admitted for decreased ambulation requiring assistance and DWIGHT, w/ cc by sepsis likely 2/2 aspiration.

## 2022-12-09 NOTE — DISCHARGE NOTE PROVIDER - HOSPITAL COURSE
89 yo male with dementia AAX0-1 at baseline brought in by wife for functional weakness at home, admitted for DWIGHT and MIA placement. CC by likely aspiration pnuemonia and UTI, patient treated with 5 day course of Zosyn then Cefpodoxime + Flagyl. Per GOC discussion, resumed feeds with puree diet with wife understand the risks of aspiration. Patient treatment course is completed and he is medically stable for DC but is pending MIA placement as wife did not like the one that had been offered and social is working on auth for the new one. Otherwise, no more workup pending. No more labs.        #Discharge: do not delete    88M PMH dementia, colon cancer (dx 1996 w/ short course of chemo and partial colectomy), BPH, mild anemia p/w weakness. Admitted for decreased ambulation requiring assistance and DWIGHT, w/ cc by sepsis likely 2/2 aspiration vs. UTI. Patient treated with 5 day course of Zosyn then cefpodoxime and flagyl. Per GO discussion, feeds resumed with puree diet. Treatment course is complete and patient is medically stable for discharge to [facility].    Inpatient treatment course:    Problem List/Main Diagnoses (system-based):  ·  Problem: Acute sepsis.   ·  Plan: RESOLVED  likely 2/2 aspiration pneumonia.   - Zosyn complete.    ·  Problem: Pneumonia, aspiration.   ·  Plan: WBC 22, downtrended along with fever curve. Completed course of antibiotics.     ·  Problem: UTI (urinary tract infection).   ·  Plan: No more suprapubic pain. Zosyn complete.    ·  Problem: Functional weakness.   ·  Plan: Hx Dementia. CTH: Frontotemporal atrophy progressed since 2016. Per wife, decreased ambulation since last admission now requiring assistance, prompting admission. Holding off further evaluation for now i/s/o infection  - palliative following   - [facility] recommended.    ·  Problem: DWIGHT (acute kidney injury).   ·  Plan: Now suspected ATN 2/2 episodes of hypotension. Cr downtrending, most recent 1.14.    ·  Problem: Anemia.   ·  Plan: normocytic anemia (hgb 11, above baseline 10.3).  Plan:   - Active T+S, transfuse if hgb<7.    ·  Problem: Constipation.   ·  Plan: Last admitted July 2022 for constipation, urinary retention.  - started bowel regimen (Senna & Miralax).    ·  Problem: BPH (benign prostatic hyperplasia).   ·  Plan: - c/w home Tamsulosin 0.8mg qhs.      Patient was discharged to: [facility]  New medications:  Changes to old medications:  Medications that were stopped:   Items to follow up as outpatient:  Physical exam at the time of discharge: #Discharge: do not delete    88M PMH dementia, colon cancer (dx 1996 w/ short course of chemo and partial colectomy), BPH, mild anemia p/w weakness. Admitted for decreased ambulation requiring assistance and DWIGHT, w/ cc by sepsis likely 2/2 aspiration vs. UTI. Patient treated with 5 day course of Zosyn then cefpodoxime and flagyl. Per GO discussion, feeds resumed with puree diet. Treatment course is complete and patient is medically stable for discharge to [facility].    Inpatient treatment course:    Problem List/Main Diagnoses (system-based):  ·  Problem: Acute sepsis.   ·  Plan: RESOLVED  likely 2/2 aspiration pneumonia.   - Zosyn complete.    ·  Problem: Pneumonia, aspiration.   ·  Plan: WBC 22, downtrended along with fever curve. Completed course of antibiotics.     ·  Problem: UTI (urinary tract infection).   ·  Plan: No more suprapubic pain. Zosyn complete.    ·  Problem: Functional weakness.   ·  Plan: Hx Dementia. CTH: Frontotemporal atrophy progressed since 2016. Per wife, decreased ambulation since last admission now requiring assistance, prompting admission. Holding off further evaluation for now i/s/o infection  - palliative following   - [facility] recommended.    ·  Problem: DWIGHT (acute kidney injury).   ·  Plan: Now suspected ATN 2/2 episodes of hypotension. Cr downtrending, most recent 1.14.    ·  Problem: Anemia.   ·  Plan: normocytic anemia (hgb 11, above baseline 10.3).  Plan:   - Active T+S, transfuse if hgb<7.    ·  Problem: Constipation.   ·  Plan: Last admitted July 2022 for constipation, urinary retention.  - started bowel regimen (Senna & Miralax).    ·  Problem: BPH (benign prostatic hyperplasia).   ·  Plan: - c/w home Tamsulosin 0.8mg qhs.      Patient was discharged to: [facility]  New medications: none  Changes to old medications: none  Medications that were stopped: none

## 2022-12-09 NOTE — SWALLOW BEDSIDE ASSESSMENT ADULT - ADDITIONAL RECOMMENDATIONS
-An instrumental swallow study is not recommended at this time given (1) the poor likelihood for pt's participation (i.e., does not even allow for palpation of swallow and therefore will not likely allow a scope to be passed transnasally) and (2) pt's frequent refusal of types of PO/untensils (e.g., will likely not accept trials of barium contrast for a VFSS, and not appearing to be amenable to potential diet modifications/strategies such as recommendation for all liquids by spoon).   -If GOC are to continue PO for pleasure/QOL despite risks of aspiration/PNA, pls allow same with safe feeding strategies.  -Oral care

## 2022-12-09 NOTE — DISCHARGE NOTE PROVIDER - NSDCCPCAREPLAN_GEN_ALL_CORE_FT
PRINCIPAL DISCHARGE DIAGNOSIS  Diagnosis: Acute sepsis  Assessment and Plan of Treatment: We treated you for pneumonia, which is an infection in your lungs. This was treated with antibiotics that helped get rid of the bacteria that cause pnuemonia in your lungs. We also monitored you for fever and other signs of more severe infection, which you did not have.  If you have fevers, increased shortness of breath or develop very increased sputum production, you should come back to the emergency room in order to be evaluated.   A life-threatening complication of an infection.  Sepsis occurs when chemicals released in the bloodstream to fight an infection trigger inflammation throughout the body. This can cause a cascade of changes that damage multiple organ systems, leading them to fail, sometimes even resulting in death.  Symptoms include fever, difficulty breathing, low blood pressure, fast heart rate, and mental confusion.  Treatment includes antibiotics and intravenous fluids.        SECONDARY DISCHARGE DIAGNOSES  Diagnosis: UTI (urinary tract infection)  Assessment and Plan of Treatment: Urinary tract infections, also called "UTIs," are infections that affect either the bladder or the kidneys. Bladder infections are more common than kidney infections. Bladder infections happen when bacteria get into the urethra and travel up into the bladder. Kidney infections happen when the bacteria travel even higher, up into the kidneys. The symptoms of a bladder infection include pain or a burning feeling when you urinate, the need to urinate often, the need to urinate suddenly or in a hurry, blood in the urine. Signs that an infection has spread to the kidneys include fever, back pain, or nausea/vomiting. It is important that you take your antibiotics as prescribed and to completion to properly treat your urinary tract infection and prevent antibiotic resistance.

## 2022-12-09 NOTE — SWALLOW BEDSIDE ASSESSMENT ADULT - SLP GENERAL OBSERVATIONS
Pt was received awake/alert in bed. Pt was confused and appeared wary of my movements. Pt was able to be calmed and when asked if he wanted water, he stated, "Water- that's good." At another time, he also repeated, "Spill" and allowed me to use hand-over-hand support to stabilize the cup (to prevent spills). Other than this, mostly nonsensical speech was noted.

## 2022-12-09 NOTE — DISCHARGE NOTE PROVIDER - ATTENDING DISCHARGE PHYSICAL EXAMINATION:
Constitutional: resting comfortably in bed; NAD  HEENT: NC/AT, PER, anicteric sclera, no nasal discharge; MMM  Neck: supple; no JVD or thyromegaly  Respiratory: CTA B/L; no W/R/R, no retractions  Cardiac: +S1/S2; RRR; no M/R/G  Gastrointestinal: soft, NT/ND; no rebound or guarding  Extremities: WWP, no clubbing or cyanosis; no peripheral edema  Musculoskeletal: NROM x4; no joint swelling, tenderness or erythema  Vascular: 2+ radial, DP/PT pulses B/L  Neurologic: AAOx1; CNII-XII grossly intact; no focal deficits; word finding difficulty

## 2022-12-09 NOTE — SWALLOW BEDSIDE ASSESSMENT ADULT - PHARYNGEAL PHASE
Pt again did not allow for laryngeal palpation. Throat clear and cough after thin liquids. Inconsistent wet voice after mildly thick/puree.
Pt did not allow me to palpate swallow. No overt s/s of airway protection deficits were noted.

## 2022-12-09 NOTE — PROGRESS NOTE ADULT - PROBLEM SELECTOR PLAN 2
Hx Dementia. CTH: Frontotemporal atrophy progressed since 2016. Per wife, decreased ambulation since last admission now requiring assistance, prompting admission. Holding off further evaluation for now i/s/o infection,     Plan:  - palliative following   - MIA recommended

## 2022-12-09 NOTE — DISCHARGE NOTE PROVIDER - CARE PROVIDER_API CALL
Hair Hull  INTERNAL MEDICINE  [address] [place] [address], [place] [address]  Phone: [phone]  Fax: [phone]  Follow Up Time: 2 weeks

## 2022-12-09 NOTE — PROGRESS NOTE ADULT - SUBJECTIVE AND OBJECTIVE BOX
##INCOMPLETE O/N Events: naeo    Subjective/ROS: Patient seen and examined at bedside, alert and somewhat agitated. Refused blood draws and became combative with repeated attempts. 12 pt ROS negative.    VITALS  Vital Signs Last 24 Hrs  T(C): 36.9 (09 Dec 2022 12:26), Max: 37.9 (08 Dec 2022 20:43)  T(F): 98.4 (09 Dec 2022 12:26), Max: 100.3 (08 Dec 2022 20:43)  HR: 84 (09 Dec 2022 12:26) (65 - 92)  BP: 142/49 (09 Dec 2022 12:26) (94/55 - 142/49)  BP(mean): 88 (08 Dec 2022 16:43) (88 - 88)  RR: 18 (09 Dec 2022 12:26) (16 - 18)  SpO2: 100% (09 Dec 2022 05:45) (94% - 100%)    Parameters below as of 09 Dec 2022 05:45  Patient On (Oxygen Delivery Method): room air        CAPILLARY BLOOD GLUCOSE          PHYSICAL EXAM  General: Lethargic, minimally responsive to sternal rub --> s/p intervention --> A&Ox1 interactive   Head: NC/AT; MMM; PERRL; EOMI  Neck: Supple; no JVD  Respiratory: CTAB; no wheezes/rales/rhonchi  Cardiovascular: Regular rhythm/rate; S1/S2+, no murmurs, rubs gallops   Gastrointestinal: Soft; NTND; bowel sounds normal and present  Extremities: WWP; no edema/cyanosis  Neurological: A&Ox3, CNII-XII grossly intact; no obvious focal deficits      MEDICATIONS  (STANDING):  aspirin enteric coated 81 milliGRAM(s) Oral daily  dextrose 5% + lactated ringers. 1000 milliLiter(s) (70 mL/Hr) IV Continuous <Continuous>  heparin   Injectable 5000 Unit(s) SubCutaneous every 8 hours  piperacillin/tazobactam IVPB.. 3.375 Gram(s) IV Intermittent every 8 hours  polyethylene glycol 3350 17 Gram(s) Oral daily  tamsulosin 0.4 milliGRAM(s) Oral at bedtime    MEDICATIONS  (PRN):  acetaminophen     Tablet .. 650 milliGRAM(s) Oral every 6 hours PRN Temp greater or equal to 38C (100.4F)      No Known Allergies      LABS                        10.1   21.03 )-----------( 277      ( 08 Dec 2022 17:45 )             31.7     12-    137  |  106  |  28<H>  ----------------------------<  109<H>  4.2   |  17<L>  |  1.49<H>    Ca    8.6      08 Dec 2022 17:45  Phos  3.1     12-  Mg     1.9         TPro  6.4  /  Alb  3.3  /  TBili  1.2  /  DBili  x   /  AST  25  /  ALT  14  /  AlkPhos  66  12-08      Urinalysis Basic - ( 08 Dec 2022 13:49 )    Color: Yellow / Appearance: Clear / S.025 / pH: x  Gluc: x / Ketone: NEGATIVE  / Bili: Negative / Urobili: 0.2 E.U./dL   Blood: x / Protein: 30 mg/dL / Nitrite: POSITIVE   Leuk Esterase: Large / RBC: > 10 /HPF / WBC Many /HPF   Sq Epi: x / Non Sq Epi: 0-5 /HPF / Bacteria: Many /HPF              IMAGING/EKG/ETC   O/N Events: naeo    Subjective/ROS: Patient seen and examined at bedside, alert and somewhat agitated. Refused blood draws and became combative with repeated attempts. 12 pt ROS negative.    VITALS  Vital Signs Last 24 Hrs  T(C): 36.9 (09 Dec 2022 12:26), Max: 37.9 (08 Dec 2022 20:43)  T(F): 98.4 (09 Dec 2022 12:26), Max: 100.3 (08 Dec 2022 20:43)  HR: 84 (09 Dec 2022 12:26) (65 - 92)  BP: 142/49 (09 Dec 2022 12:26) (94/55 - 142/49)  BP(mean): 88 (08 Dec 2022 16:43) (88 - 88)  RR: 18 (09 Dec 2022 12:26) (16 - 18)  SpO2: 100% (09 Dec 2022 05:45) (94% - 100%)    Parameters below as of 09 Dec 2022 05:45  Patient On (Oxygen Delivery Method): room air        CAPILLARY BLOOD GLUCOSE          PHYSICAL EXAM  General: Alert appears confused this morning, word finding difficulties  Head: NC/AT; PERRL; EOMI. Slightly dry mucous membranes   Neck: Supple; no JVD  Respiratory: CTAB; no wheezes/rales/rhonchi  Cardiovascular: Regular rhythm/rate; S1/S2+, no murmurs, rubs gallops   Gastrointestinal: Soft; NTND; bowel sounds normal and present  Extremities: WWP; no edema/cyanosis  Neurological: A&Ox3, CNII-XII grossly intact; no obvious focal deficits      MEDICATIONS  (STANDING):  aspirin enteric coated 81 milliGRAM(s) Oral daily  dextrose 5% + lactated ringers. 1000 milliLiter(s) (70 mL/Hr) IV Continuous <Continuous>  heparin   Injectable 5000 Unit(s) SubCutaneous every 8 hours  piperacillin/tazobactam IVPB.. 3.375 Gram(s) IV Intermittent every 8 hours  polyethylene glycol 3350 17 Gram(s) Oral daily  tamsulosin 0.4 milliGRAM(s) Oral at bedtime    MEDICATIONS  (PRN):  acetaminophen     Tablet .. 650 milliGRAM(s) Oral every 6 hours PRN Temp greater or equal to 38C (100.4F)      No Known Allergies      LABS                        10.1   21.03 )-----------( 277      ( 08 Dec 2022 17:45 )             31.7     12-    137  |  106  |  28<H>  ----------------------------<  109<H>  4.2   |  17<L>  |  1.49<H>    Ca    8.6      08 Dec 2022 17:45  Phos  3.1     12  Mg     1.9         TPro  6.4  /  Alb  3.3  /  TBili  1.2  /  DBili  x   /  AST  25  /  ALT  14  /  AlkPhos  66        Urinalysis Basic - ( 08 Dec 2022 13:49 )    Color: Yellow / Appearance: Clear / S.025 / pH: x  Gluc: x / Ketone: NEGATIVE  / Bili: Negative / Urobili: 0.2 E.U./dL   Blood: x / Protein: 30 mg/dL / Nitrite: POSITIVE   Leuk Esterase: Large / RBC: > 10 /HPF / WBC Many /HPF   Sq Epi: x / Non Sq Epi: 0-5 /HPF / Bacteria: Many /HPF              IMAGING/EKG/ETC

## 2022-12-09 NOTE — DISCHARGE NOTE PROVIDER - NSDCFUADDAPPT_GEN_ALL_CORE_FT
Dr. Hull's office will call you to schedule a post-hospital follow-up appointment within the next 1-2 weeks.

## 2022-12-09 NOTE — DISCHARGE NOTE PROVIDER - NSDCMRMEDTOKEN_GEN_ALL_CORE_FT
ascorbic acid 500 mg oral tablet: 1 tab(s) orally every 24 hours  aspirin 81 mg oral delayed release tablet: 1 tab(s) orally once a day  Multiple Vitamins oral tablet: 1 tab(s) orally once a day  polyethylene glycol 3350 oral powder for reconstitution: 17 gram(s) orally once a day   tamsulosin 0.4 mg oral capsule: 1 cap(s) orally once a day (at bedtime)  zinc sulfate 220 mg oral capsule: 1 cap(s) orally every 24 hours

## 2022-12-09 NOTE — PROGRESS NOTE ADULT - ATTENDING COMMENTS
88M PMH dementia, colon cancer (dx 1996 w/ short course of chemo and partial colectomy), BPH, mild anemia p/w weakness. Admitted for DWIGHT and ambulatory dysfunction. course complicated by aspiration, sepsis, and metabolic encephalopathy       #sepsis 2/2 suspected ASP pna  #acute metabolic encephalopathy    met 3/4 sirs with WBC > 17k , 102.5 and sbp 70/48   s/p 2 L ivf, s/p vanc and zosyn   lactate 1.3   blood cultures showed NGTD on prelim , MRSA neg  will cw zosyn for now and de-escalate abx as tolerated   SBp improved after IVF and mental status at bs now    #dwight-> ATN   likely pre-renal  s/p IVF   cr improved from 1.73--1.39-- increased to 1.5 during hypotensive episodes-  ATN 2/.2 hypotension   will cw gentle hydration and repeat bmp  avoid nephrotoxins     #dementia   #acute metabolic encephalopathy on chronic dementia   #failed dysphagia screen  s/s rec soft diet 12/7 --> however mental status deteriorated, currently NPO pending re-eval,        #functional quadriplegia/ ambulatory dysfunction   PT recs MIA when medically optimized, pending discussion with family     #dvt ppx HSQ

## 2022-12-10 NOTE — PROGRESS NOTE ADULT - PROBLEM SELECTOR PLAN 2
WBC 22, not downtrending but fever curve coming down. WIll continue Zosyn for now. Xray in AM concerning for LLL infiltrate, not consistent with aspiration.     Plan: WBC 22, not downtrending but fever curve coming down. WIll continue Zosyn for now. Xray in AM concerning for LLL infiltrate, not consistent with aspiration.     Plan:  - f.u urine strep   - f.u urine legionella  - f/u procal   - NPO per speech and swallow

## 2022-12-10 NOTE — PROGRESS NOTE ADULT - ATTENDING COMMENTS
Patient was seen and examined with the resident team today.  I agree with Dr. Mccray's assessment and plan with the following exceptions/additions:     Briefly, this is an 89yo gentleman with a PMH of advanced dementia (AOx1), remote CRC s/p partial colectomy (1996), BPH and anemia who p/w FTT with hospital c/b sepsis l2/2 aspiration > UTI and DWIGHT.  Remains NPO given risk of aspiration, while also trying to advance GOC discussions with his wife.     #Aspiration PNA - WBC has not responded despite several days of Zosyn; would just complete 5-day course on Monday w/Zosyn  #GOC - has not eaten for several days, c/w IVF's but need to advance expectations with wife  #DVT PPx - SQh  #Dispo - waiting for auth for [facility]; however, cannot discharge until access to nutrition finalized with wife     Nely Mccormick  [phone] Patient was seen and examined with the resident team today.  I agree with Dr. Mccray's assessment and plan with the following exceptions/additions:     Briefly, this is an 87yo gentleman with a PMH of advanced dementia (AOx1, bedbound), remote CRC s/p partial colectomy (1996), BPH and anemia who p/w FTT with hospital c/b sepsis l2/2 aspiration > UTI and DWIGHT.  Remains NPO given risk of aspiration, while also trying to advance GOC discussions with his wife.     #Aspiration PNA - WBC has not responded despite several days of Zosyn; would just complete 5-day course on Monday w/Zosyn  #Advanced dementia, functional quadriplegia, dysphagia - has not eaten for several days, c/w IVF's but need to advance expectations with wife  #DVT PPx - SQh  #Dispo - waiting for auth for [facility]; however, cannot discharge until access to nutrition finalized with wife     Nely Mccormick  [phone] Patient was seen and examined with the resident team today.  I agree with Dr. Mccray's assessment and plan with the following exceptions/additions:     Briefly, this is an 87yo gentleman with a PMH of advanced dementia (AOx1, bedbound), remote CRC s/p partial colectomy (1996), BPH and anemia who p/w FTT with hospital c/b sepsis l2/2 aspiration > UTI and DWIGHT.  Remains NPO given risk of aspiration, while also trying to advance GOC discussions with his wife.     #Aspiration PNA - WBC has not responded despite several days of Zosyn; would just complete 5-day course on Monday w/Zosyn  #Advanced dementia, functional quadriplegia, dysphagia w/moderate PCM - has not eaten for several days, c/w IVF's but need to advance expectations with wife  #DVT PPx - SQh  #Dispo - waiting for auth for [facility]; however, cannot discharge until access to nutrition finalized with wife     Nely Mccormick  [phone]

## 2022-12-10 NOTE — PROGRESS NOTE ADULT - SUBJECTIVE AND OBJECTIVE BOX
Physical Medicine and Rehabilitation Progress Note :       Patient is a 88y old  Male who presents with a chief complaint of Dementia, FTT, Weakness (10 Dec 2022 11:36)      HPI:  88M PMH dementia, colon cancer (dx 1996 w/ short course of chemo and partial colectomy), BPH, mild anemia p/w weakness. Pt unsure where he is or why he is here. Pt denies any complaints. Last admitted July 2022 for constipation, urinary retention. Per wife at bedside: Ever since last admission pt has been ambulating less - really only able to ambulate at home by holding onto things. Over the last week pt has increasing difficulty getting in and out of bed on his own, slowly worsening, finally prompting today's ED visit. Pt has "low BP" (wife unsure of number) for years. Also decreased appetite for many years. Not on any meds. Pt denies any pain - denies HA, CP, abd pain. Wife denies vomiting, coughing, black/bloody stool, diarrhea. Has HHA 6 hrs/day, 5 days a week    In the ED, VSS other than hypotension 109/63 (78) - at baseline per wife. Labs significant for borderline leukocytosis (WBC 10.6k), normocytic anemia (hgb 11, above baseline 10.3), DWIGHT (BUN/Cr 38/1.73, baseline possible Cr 1.29). CTH: Frontotemporal atrophy progressed since 2016. CXR: NAD. He received 0.5L 0.9%NS bolus. (06 Dec 2022 22:49)                            9.7    22.62 )-----------( 264      ( 10 Dec 2022 05:30 )             29.5       12-10    143  |  109<H>  |  25<H>  ----------------------------<  116<H>  4.9   |  24  |  1.58<H>    Ca    9.1      10 Dec 2022 05:30    TPro  6.4  /  Alb  3.3  /  TBili  1.2  /  DBili  x   /  AST  25  /  ALT  14  /  AlkPhos  66  12-08    Vital Signs Last 24 Hrs  T(C): 37.2 (10 Dec 2022 05:47), Max: 37.2 (09 Dec 2022 20:40)  T(F): 99 (10 Dec 2022 05:47), Max: 99 (10 Dec 2022 05:47)  HR: 90 (10 Dec 2022 05:47) (75 - 90)  BP: 108/60 (10 Dec 2022 05:47) (108/60 - 108/63)  BP(mean): --  RR: 18 (10 Dec 2022 05:47) (18 - 18)  SpO2: 94% (10 Dec 2022 05:47) (94% - 100%)    Parameters below as of 10 Dec 2022 05:47  Patient On (Oxygen Delivery Method): room air        MEDICATIONS  (STANDING):  aspirin enteric coated 81 milliGRAM(s) Oral daily  dextrose 5% + lactated ringers. 1000 milliLiter(s) (70 mL/Hr) IV Continuous <Continuous>  dextrose 5% + lactated ringers. 1000 milliLiter(s) (70 mL/Hr) IV Continuous <Continuous>  heparin   Injectable 5000 Unit(s) SubCutaneous every 8 hours  piperacillin/tazobactam IVPB.. 3.375 Gram(s) IV Intermittent every 8 hours  polyethylene glycol 3350 17 Gram(s) Oral daily  tamsulosin 0.4 milliGRAM(s) Oral at bedtime    MEDICATIONS  (PRN):  acetaminophen     Tablet .. 650 milliGRAM(s) Oral every 6 hours PRN Temp greater or equal to 38C (100.4F)       Physical Therapy Functional Status Assessment :   12/9/2022    Cognitive/Neuro/Behavioral  Level of Consciousness: confused  Arousal Level: arouses to voice  Orientation: disoriented to;  place;  time;  situation  Speech: garbled  Mood/Behavior: combative (aggressive)    Language Assistance  Preferred Language to Address Healthcare Preferred Language to Address Healthcare: English    Therapeutic Interventions      Bed Mobility  Bed Mobility Training Rolling/Turning: moderate assist (50% patient effort);  2 person assist;  verbal cues  Bed Mobility Training Scooting: moderate assist (50% patient effort);  2 person assist;  verbal cues  Bed Mobility Training Sit-to-Supine: moderate assist (50% patient effort);  2 person assist;  verbal cues  Bed Mobility Training Supine-to-Sit: moderate assist (50% patient effort);  2 person assist;  verbal cues  Bed Mobility Training Limitations: decreased ability to use arms for pushing/pulling;  decreased ability to use legs for bridging/pushing;  impaired ability to control trunk for mobility;  Demo impaired trunk control and patient frequently distracted grabbing at catheter and blankets and attmepting to grab and hold onto treating PT.;  decreased flexibility;  decreased ROM;  decreased strength;  impaired balance;  impaired postural control    Sit-Stand Transfer Training  Sit-to-Stand Transfer Training Treatment not Performed: Patient with poor trunk control and several attempts to grab PT, catheter and blankets despite max verbal cueing.            PM&R Impression : as above    Current Disposition Plan Recommendations :    subacute rehab placement

## 2022-12-10 NOTE — PROGRESS NOTE ADULT - ASSESSMENT
88M PMH dementia, colon cancer (dx 1996 w/ short course of chemo and partial colectomy), BPH, mild anemia p/w weakness. Admitted for decreased ambulation requiring assistance and WDIGHT, w/ cc by sepsis likely 2/2 aspiration.

## 2022-12-10 NOTE — PROGRESS NOTE ADULT - ASSESSMENT
per Internal Medicine    88 y o M PMH dementia, colon cancer (dx 1996 w/ short course of chemo and partial colectomy), BPH, mild anemia p/w weakness. Admitted for decreased ambulation requiring assistance and DWIGHT, w/ cc by sepsis likely 2/2 aspiration.       Problem/Plan - 1:  ·  Problem: Acute sepsis.   ·  Plan: likely 2/2 aspiration pneumonia.     Plan:   - c/w zosyn   - switch to ceftriaxone on 12/10  - SS reevaluation and GOC discussions.    Problem/Plan - 2:  ·  Problem: Functional weakness.   ·  Plan: Hx Dementia. CTH: Frontotemporal atrophy progressed since 2016. Per wife, decreased ambulation since last admission now requiring assistance, prompting admission. Holding off further evaluation for now i/s/o infection,     Plan:  - palliative following   - MIA recommended.    Problem/Plan - 3:  ·  Problem: DWIGHT (acute kidney injury).   ·  Plan: Now suspected ATN 2/2 episodes of hypotension. Will continue to monitor Cr in coming days/.       Plan:   - gentle hydration, expect Cr to rise further, will watch for post obstructive diuresis when the time comes.    Problem/Plan - 4:  ·  Problem: Anemia.   ·  Plan: normocytic anemia (hgb 11, above baseline 10.3).    Plan:   - Active T+S, transfuse if hgb<7.    Problem/Plan - 5:  ·  Problem: Constipation.   ·  Plan: Last admitted July 2022 for constipation, urinary retention.  - started bowel regimen (Senna & Miralax).    Problem/Plan - 6:  ·  Problem: BPH (benign prostatic hyperplasia).   ·  Plan: - c/w home Tamsulosin 0.4mg qhs.    Problem/Plan - 7:  ·  Problem: Healthcare maintenance.   ·  Plan: F: None   E: Replete as necessary K>4 Mg>2  N: NPO for now i/s/o likely aspiration event GOC discussion    DVT Prophylaxis: Lovenox 40mg daily   GI prophylaxis: None   CODE STATUS: FULL.

## 2022-12-10 NOTE — PROGRESS NOTE ADULT - SUBJECTIVE AND OBJECTIVE BOX
O/N Events: naeo    Subjective/ROS: Patient seen and examined at bedside unable to elicit information, went back when wife was there, complained of suprapubic pain and burning with urination. No CVA tenderness, per wife, patient suffers from recurrent UTI's. Limited do to patient dementia but ROS otherwise negative.    VITALS  Vital Signs Last 24 Hrs  T(C): 36.2 (10 Dec 2022 14:21), Max: 37.2 (09 Dec 2022 20:40)  T(F): 97.2 (10 Dec 2022 14:21), Max: 99 (10 Dec 2022 05:47)  HR: 99 (10 Dec 2022 14:21) (75 - 99)  BP: 100/80 (10 Dec 2022 14:21) (100/80 - 108/63)  BP(mean): --  RR: 18 (10 Dec 2022 14:21) (18 - 18)  SpO2: 94% (10 Dec 2022 14:21) (94% - 100%)    Parameters below as of 10 Dec 2022 14:21  Patient On (Oxygen Delivery Method): room air        CAPILLARY BLOOD GLUCOSE      POCT Blood Glucose.: 119 mg/dL (10 Dec 2022 05:01)  POCT Blood Glucose.: 119 mg/dL (10 Dec 2022 00:37)      PHYSICAL EXAM  General: NAD  Head: NC/AT; MMM; PERRL; EOMI;  Neck: Supple; no JVD  Respiratory: Decreased breath sounds, LLB   Cardiovascular: Regular rhythm/rate; S1/S2+, no murmurs, rubs gallops  Gastrointestinal: Soft; NTND; bowel sounds normal and present  Extremities: WWP; no edema/cyanosis  Neurological: A&Ox1, CNII-XII grossly intact; no obvious focal deficits    MEDICATIONS  (STANDING):  acetaminophen   IVPB .. 1000 milliGRAM(s) IV Intermittent once  aspirin enteric coated 81 milliGRAM(s) Oral daily  dextrose 5% + lactated ringers. 1000 milliLiter(s) (70 mL/Hr) IV Continuous <Continuous>  dextrose 5% + lactated ringers. 1000 milliLiter(s) (70 mL/Hr) IV Continuous <Continuous>  heparin   Injectable 5000 Unit(s) SubCutaneous every 8 hours  piperacillin/tazobactam IVPB.. 3.375 Gram(s) IV Intermittent every 8 hours  polyethylene glycol 3350 17 Gram(s) Oral daily  tamsulosin 0.4 milliGRAM(s) Oral at bedtime    MEDICATIONS  (PRN):  acetaminophen     Tablet .. 650 milliGRAM(s) Oral every 6 hours PRN Temp greater or equal to 38C (100.4F)      No Known Allergies      LABS                        9.7    22.62 )-----------( 264      ( 10 Dec 2022 05:30 )             29.5     12-10    143  |  109<H>  |  25<H>  ----------------------------<  116<H>  4.9   |  24  |  1.58<H>    Ca    9.1      10 Dec 2022 05:30    TPro  6.4  /  Alb  3.3  /  TBili  1.2  /  DBili  x   /  AST  25  /  ALT  14  /  AlkPhos  66  12-08                IMAGING/EKG/ETC

## 2022-12-11 NOTE — PROGRESS NOTE ADULT - ASSESSMENT
Briefly, this is an 87yo gentleman with a PMH of advanced dementia (AOx1, bedbound), remote CRC s/p partial colectomy (1996), BPH and anemia who p/w FTT with hospital c/b sepsis l2/2 aspiration > UTI and DWIGHT.  Remains NPO given risk of aspiration, while also trying to advance GOC discussions with his wife. Notably his leukocytosis is finally responding to the Zosyn and Cr is improving towards baseline.     #Aspiration PNA - c/w Zosyn x5 days (last day on 12/12)  #R/O UTI - not convinced that he has a UTI given exam benign and U/A appears contaminated; nonetheless, Zosyn would cover his E. coli   #Advanced dementia, functional quadriplegia, dysphagia w/moderate PCM - has not eaten for several days, c/w IVF's; need to advance GOC with wife and see if she is accepting of pleasure feeds    #DVT PPx - SQH  #Dispo - waiting for auth for [facility]; however, cannot discharge until access to nutrition finalized with wife (he has been NPO since 12/8)    Nely Mccormick  [phone] Briefly, this is an 87yo gentleman with a PMH of advanced dementia (AOx1, bedbound), remote CRC s/p partial colectomy (1996), BPH and anemia who p/w FTT with hospital c/b sepsis l2/2 aspiration > UTI and DWIGHT.  Remains NPO given risk of aspiration, while also trying to advance GOC discussions with his wife. Notably his leukocytosis is finally responding to the Zosyn and Cr is improving towards baseline.     #Aspiration PNA - c/w Zosyn x5 days (last day on 12/12)  #R/O UTI - not convinced that he has a UTI given exam benign and U/A appears contaminated; nonetheless, Zosyn would cover his E. coli   #DWIGHT - resolving  #Advanced dementia, functional quadriplegia, dysphagia w/moderate PCM - has not eaten for several days, c/w IVF's; need to advance GOC with wife and see if she is accepting of pleasure feeds    #DVT PPx - SQH  #Dispo - [place] for auth [place]; however, cannot discharge until access to nutrition finalized with wife (he has been NPO since 12/8)    Nely Mccormick  [phone]

## 2022-12-11 NOTE — PROGRESS NOTE ADULT - SUBJECTIVE AND OBJECTIVE BOX
INTERVAL EVENTS:  -- NAEO    SUBJECTIVE:  -- Review of Systems: 12 point review of systems difficult to obtain due to advanced dementia; however, denies pain when pressing on his suprapubic area    MEDICATIONS:  MEDICATIONS  (STANDING):  aspirin enteric coated 81 milliGRAM(s) Oral daily  dextrose 5% + lactated ringers. 1000 milliLiter(s) (70 mL/Hr) IV Continuous <Continuous>  dextrose 5% + lactated ringers. 1000 milliLiter(s) (70 mL/Hr) IV Continuous <Continuous>  dextrose 5% + lactated ringers. 1000 milliLiter(s) (70 mL/Hr) IV Continuous <Continuous>  heparin   Injectable 5000 Unit(s) SubCutaneous every 8 hours  piperacillin/tazobactam IVPB.. 3.375 Gram(s) IV Intermittent every 8 hours  polyethylene glycol 3350 17 Gram(s) Oral daily  tamsulosin 0.4 milliGRAM(s) Oral at bedtime    MEDICATIONS  (PRN):  acetaminophen     Tablet .. 650 milliGRAM(s) Oral every 6 hours PRN Temp greater or equal to 38C (100.4F)    Allergies  No Known Allergies    OBJECTIVE:  Vital Signs Last 24 Hrs  T(C): 36.8 (11 Dec 2022 06:42), Max: 36.8 (11 Dec 2022 06:42)  T(F): 98.3 (11 Dec 2022 06:42), Max: 98.3 (11 Dec 2022 06:42)  HR: 67 (11 Dec 2022 06:42) (67 - 99)  BP: 142/73 (11 Dec 2022 06:42) (100/80 - 142/73)  BP(mean): --  RR: 19 (11 Dec 2022 06:42) (18 - 19)  SpO2: 99% (11 Dec 2022 06:42) (94% - 99%)    Parameters below as of 10 Dec 2022 20:20  Patient On (Oxygen Delivery Method): room air      I&O's Summary    10 Dec 2022 07:01  -  11 Dec 2022 07:00  --------------------------------------------------------  IN: 560 mL / OUT: 0 mL / NET: 560 mL    PHYSICAL EXAM:  Gen: NAD, sitting upright in bed  HEENT: NCAT, MMM, clear OP  CV: RRR, no m/g/r appreciated  Pulm: CTA B, no w/r/r; no increase in WOB  Abd: normoactive BS, soft, NTND  Ext: WWP,no c/c/e  : condom cath bag w/clear yellow urine, no turbidity or hematuria   Neuro: AOx1 (at best), nonfocal  Psych: more cooperative today     LABS:                        10.2   12.75 )-----------( 308      ( 11 Dec 2022 05:30 )             31.7     12-11    145  |  109<H>  |  23  ----------------------------<  108<H>  3.5   |  25  |  1.39<H>    Ca    9.2      11 Dec 2022 05:30  Phos  2.8     12-11  Mg     2.2     12-11    MICRODATA:  -- No new microdata.    RADIOLOGY/OTHER STUDIES:  -- CXR (12/10, per radiology ) - Discoid change and/or infiltrate left lung base

## 2022-12-12 NOTE — SWALLOW BEDSIDE ASSESSMENT ADULT - NS SPL SWALLOW CLINIC TRIAL FT
Adequate bolus acceptance and containment but with periods of disorganized bolus manipulation (prolonged swishing of liquids), bolus holding, and ?piecemeal deglutition with multiple swallows to clear bolus from oral cavity. Adequate bolus acceptance and containment but with periods of disorganized bolus manipulation (prolonged swishing of liquids), bolus holding, and ?piecemeal deglutition with multiple swallows to clear bolus from oral cavity. Unchewed solid bolus remained on lingual surface and was eventually removed as pt made no attempt to masticate it Adequate bolus acceptance and containment but with periods of disorganized bolus manipulation (prolonged swishing of liquids), bolus holding, and ?piecemeal deglutition with multiple swallows to clear bolus from oral cavity. Unchewed solid bolus remained on lingual surface and was eventually removed as pt made no attempt to manipulate it despite verbal prompts/modeling. Laryngeal movement palpated. No clinical overt s/s of aspiration appreciated.

## 2022-12-12 NOTE — SWALLOW BEDSIDE ASSESSMENT ADULT - SLP GENERAL OBSERVATIONS
Pt received awake in bed with wife at bedside. Improved MS Pt received awake in bed with wife at bedside. Improved MS and overall participation compared to prior assessment.

## 2022-12-12 NOTE — PROGRESS NOTE ADULT - ATTENDING COMMENTS
88M PMH dementia, colon cancer (dx 1996 w/ short course of chemo and partial colectomy), BPH, mild anemia p/w weakness. Admitted for DWIGHT and ambulatory dysfunction. course complicated by aspiration, sepsis, and metabolic encephalopathy       #sepsis 2/2 suspected ASP pna  #acute metabolic encephalopathy on chronic dementia   met 3/4 sirs with WBC > 17k , 102.5 and sbp 70/48   lactate 1.3 ,s/p 2 L ivf, s/p vanc and zosyn   blood cultures showed NGTD, MRSA neg  will cw zosyn 12/12 5 days for aspiration pneumonia   SBp improved after IVF and mental status at bs now however pt continues to be at risk for aspiration   pending discussion w wife to resume pleasure feeds today   pending palliative fu     #dwight-> ATN   2/2 hypotension   s/p IVF   cr improved from 1.73- 1.14  avoid nephrotoxins     #dementia   #acute metabolic encephalopathy on chronic advanced dementia   #failed dysphagia screen  s/s rec soft diet 12/7 --> however mental status deteriorated, currently NPO pending re-eval, and discussion w family        #functional quadriplegia/ ambulatory dysfunction   PT recs MIA when medically optimized, pending auth and discussion with family     #dvt ppx HSQ

## 2022-12-12 NOTE — CHART NOTE - NSCHARTNOTEFT_GEN_A_CORE
consulted by primary team for IV placement  patient with visible and palpable veins  attempted x 2 on left upper extremity, patient unable to stay still   becomes very agitated, making a fist and aiming  despite having assistance x 2 RN, writer unable to safely place IV lock  will notify resident

## 2022-12-12 NOTE — SWALLOW BEDSIDE ASSESSMENT ADULT - MUCOSAL QUALITY
Thick, dry, greenish secretions coated posterior oral cavity. Use of a hemostat was necessary to remove large pieces from palate. Oral care provided.
On this visit, he allowed oral care (which is a change from the previous visit).

## 2022-12-12 NOTE — SWALLOW BEDSIDE ASSESSMENT ADULT - SWALLOW EVAL: DIAGNOSIS
Pt p/w signs of pharyngeal dysphagia characterized by inconsistent overt s/s of airway protection deficits across consistencies. This is a change in presentation from when the pt was last evaluated by this SVC. Cannot recommend a safe PO diet. Recommend establishing GOC re long-term feeding given (1) progressiveness of dementia, and (2) pt's desire for specific PO in setting of risk for prandial aspiration.
Overall, there has been a notable improvement in pt's clinical presentation compared to 12/9, likely r/t improving MS.  However, pt does continue to present with severe oral phase deficits. Although no clinical overt s/s of aspiration were appreciated with trialed consistencies, it should be noted that silent aspiration cannot be r/o at bedside and that pt's presentation will continue to fluctuate i/s/o dementia. However, based on today’s assessment, wallow physiology appears sufficient to meet pt’s goal for oral intake without distress. Recommend pt resume a pureed diet with thin liquids with strict aspiration precautions/use of strategies described below.
This was a limited exam given pt's reduced ability to participate. With these PO trials, no overt s/s of airway protection deficits were noted. Recommend starting soft-and-bite-sized solids and thin liquids w general aspiration precautions and monitoring diet tolerance. Despite no overt s/sx of aspiration on this exam, pt is at increased risk of aspiration given confusion, distractibility, and dependence on others for feeding. Clarifying long-term feeding goals is recommended.

## 2022-12-12 NOTE — SWALLOW BEDSIDE ASSESSMENT ADULT - SWALLOW EVAL: RECOMMENDED FEEDING/EATING TECHNIQUES
Limit distractions. Encourage self-administered cup sips of liquid./maintain upright posture during/after eating for 30 mins/small sips/bites
Feed slowly. Limit distractions. Discourage speaking while orally managing PO,/maintain upright posture during/after eating for 30 mins/small sips/bites
DO NOT FORCE FEED, only feed when fully alert and willing to accept, ensure oral cavity is clear prior to administering next bite/sip/allow for swallow between intakes/check mouth frequently for oral residue/pocketing/crush medication (when feasible)/maintain upright posture during/after eating for 30 mins/position upright (90 degrees)/small sips/bites

## 2022-12-12 NOTE — PROGRESS NOTE ADULT - SUBJECTIVE AND OBJECTIVE BOX
O/N Events: naeo, patient pulled out IV    Subjective/ROS: Patient seen and examined at bedside. Calm and in no acute distress, later tried to place IV and patient became very agitated. ROS limited by patient's dementia w/ agitation, does not appear to be in acute pain.     VITALS  Vital Signs Last 24 Hrs  T(C): 37.6 (12 Dec 2022 05:57), Max: 37.6 (12 Dec 2022 05:57)  T(F): 99.7 (12 Dec 2022 05:57), Max: 99.7 (12 Dec 2022 05:57)  HR: 63 (12 Dec 2022 05:57) (60 - 65)  BP: 142/74 (12 Dec 2022 05:57) (136/65 - 164/59)  BP(mean): --  RR: 18 (12 Dec 2022 05:57) (18 - 19)  SpO2: 96% (12 Dec 2022 05:57) (96% - 99%)    Parameters below as of 12 Dec 2022 05:57  Patient On (Oxygen Delivery Method): room air        CAPILLARY BLOOD GLUCOSE      POCT Blood Glucose.: 112 mg/dL (12 Dec 2022 08:24)  POCT Blood Glucose.: 106 mg/dL (12 Dec 2022 06:21)  POCT Blood Glucose.: 102 mg/dL (12 Dec 2022 01:04)      PHYSICAL EXAM  General: NAD  Head: NC/AT; MMM; PERRL; EOMI;  Neck: Supple; no JVD  Respiratory: Decreased breath sounds, LLB   Cardiovascular: Regular rhythm/rate; S1/S2+, no murmurs, rubs gallops  Gastrointestinal: Soft; NTND; bowel sounds normal and present  Extremities: WWP; no edema/cyanosis  Neurological: A&Ox1, CNII-XII grossly intact; no obvious focal deficits    MEDICATIONS  (STANDING):  aspirin enteric coated 81 milliGRAM(s) Oral daily  dextrose 5% + lactated ringers. 1000 milliLiter(s) (70 mL/Hr) IV Continuous <Continuous>  dextrose 5% + lactated ringers. 1000 milliLiter(s) (70 mL/Hr) IV Continuous <Continuous>  dextrose 5% + lactated ringers. 1000 milliLiter(s) (70 mL/Hr) IV Continuous <Continuous>  heparin   Injectable 5000 Unit(s) SubCutaneous every 8 hours  piperacillin/tazobactam IVPB.. 3.375 Gram(s) IV Intermittent every 8 hours  polyethylene glycol 3350 17 Gram(s) Oral daily  tamsulosin 0.4 milliGRAM(s) Oral at bedtime    MEDICATIONS  (PRN):  acetaminophen     Tablet .. 650 milliGRAM(s) Oral every 6 hours PRN Temp greater or equal to 38C (100.4F)      No Known Allergies      LABS                        8.1    10.26 )-----------( 483      ( 12 Dec 2022 10:00 )             26.2     12-12    138  |  106  |  26<H>  ----------------------------<  98  4.2   |  22  |  1.14    Ca    10.0      12 Dec 2022 10:00  Phos  2.8     12-12  Mg     2.9     12-12        Urinalysis Basic - ( 11 Dec 2022 09:06 )    Color: Yellow / Appearance: Turbid / SG: >=1.030 / pH: x  Gluc: x / Ketone: Trace mg/dL  / Bili: Negative / Urobili: 0.2 E.U./dL   Blood: x / Protein: 100 mg/dL / Nitrite: NEGATIVE   Leuk Esterase: Large / RBC: 5-10 /HPF / WBC Many /HPF   Sq Epi: x / Non Sq Epi: 5-10 /HPF / Bacteria: Present /HPF              IMAGING/EKG/ETC

## 2022-12-12 NOTE — SWALLOW BEDSIDE ASSESSMENT ADULT - CONSISTENCIES ADMINISTERED
~2oz of thin liq by cup, ~1oz of mildly thick liq by cup, 3 tsp of puree. Pt refused all spoon sips of liquids by swatting away the spoon.
thin liquid/pureed/minced & moist/soft & bite-sized
~2oz of thin liq, 5 tsp of puree, 2 bites of soft-and-bite-sized solids

## 2022-12-12 NOTE — PROVIDER CONTACT NOTE (OTHER) - SITUATION
Provider made aware of patient rectal temperature of 101.9 F
Provider made aware of /48 and temperature 101.0 F (oral).
Provider made aware that patient removed IV access. Patient combative when attempting to replace IV access. Patient currently has no IV access.
Platelet increase from 308 to 483

## 2022-12-12 NOTE — CHART NOTE - NSCHARTNOTEFT_GEN_A_CORE
Spoke at length with Mr. Bianchi' wife this afternoon, given that the patient was treated for aspiration pneumonia and remained NPO for >3 days. Discussed that given the patient's advanced age and comorbidities, we would not be offering advanced therapies for nutrition such as TPN or PEG tube placement. She is in full understanding and would like to resume feeds for her  and is understanding of the risks of aspiration including pneumonia and ultimately death. Patient also pulled IV line out overnight and we have been unable to place new IV line given patient's agitation. We have had multiple failed attempts throughout the day by different providers, including nursing staff, physicians and CINP's. Given the risks for both patient and staff members to attempt to place IV line for the patient, we have mutually come to an agreement to complete the patient's antibiotic course via oral antibiotics.

## 2022-12-12 NOTE — SWALLOW BEDSIDE ASSESSMENT ADULT - COMMENTS
INCOMPLETE NOTE Pt has been seen multiple times throughout this admission for swallowing evaluations. Last evaluated on 12/12. At that time, pt p/w inconsistent overt s/s of airway protection deficits across consistencies which was a change compared to prior assessments. Based on pt's high risk for aspiration and its negative sequela, a diet was not advised. A GOC discussion re: long-term feeding was recommended. Pt has been seen multiple times throughout this admission for swallowing evaluations. Last evaluated on 12/12. At that time, pt p/w inconsistent overt s/s of airway protection deficits across consistencies which was a change compared to prior assessments. Based on pt's high risk for aspiration and its negative sequela, a diet was not advised. A GOC discussion re: long-term feeding was recommended. According to medical resident, pt's wife was advised on potential risks of aspiration if pt were to proceed with a PO diet. She expressed her understanding and wanted to resume PO. Medical team now reconsulted to assist in recommendations for comfort feeds.

## 2022-12-12 NOTE — SWALLOW BEDSIDE ASSESSMENT ADULT - SLP PERTINENT HISTORY OF CURRENT PROBLEM
h/o dementia w CTH showing frontotemporal atrophy progression since 2016
PMH dementia, colon cancer (dx 1996 w/ short course of chemo and partial colectomy), BPH, mild anemia p/w weakness. Admitted for decreased ambulation requiring assistance and DWIGHT, w/ cc by sepsis likely 2/2 aspiration vs. UTI.

## 2022-12-12 NOTE — SWALLOW BEDSIDE ASSESSMENT ADULT - MODE OF PRESENTATION
cup/spoon
He self administered cup sips of liquid w hand-over-hand support
fed by wife, which pt eagerly accepted by opening his mouth and leaning towards spoon/cup

## 2022-12-12 NOTE — PROGRESS NOTE ADULT - PROBLEM SELECTOR PLAN 2
WBC 22, not downtrending but fever curve coming down. WIll continue Zosyn for now. Xray in AM concerning for LLL infiltrate, not consistent with aspiration. Procal was elevated.     Plan:  - pending GOC discussion  - NPO per speech and swallow

## 2022-12-12 NOTE — PROGRESS NOTE ADULT - ASSESSMENT
88M PMH dementia, colon cancer (dx 1996 w/ short course of chemo and partial colectomy), BPH, mild anemia p/w weakness. Admitted for decreased ambulation requiring assistance and DWIGHT, w/ cc by sepsis likely 2/2 aspiration vs. UTI.

## 2022-12-13 NOTE — PROGRESS NOTE ADULT - ASSESSMENT
88M PMH dementia, colon cancer (dx 1996 w/ short course of chemo and partial colectomy), BPH, mild anemia p/w weakness. Admitted for decreased ambulation requiring assistance and DWIGHT, w/ cc by sepsis likely 2/2 aspiration vs. UTI, pending MIA placement.

## 2022-12-13 NOTE — PROGRESS NOTE ADULT - ATTENDING COMMENTS
88M PMH dementia, colon cancer (dx 1996 w/ short course of chemo and partial colectomy), BPH, mild anemia p/w weakness. Admitted for DWIGHT and ambulatory dysfunction. course complicated by aspiration, sepsis, and metabolic encephalopathy       #severe sepsis 2/2 suspected ASP pna  #acute metabolic encephalopathy on chronic dementia   met 3/4 sirs with WBC > 17k , 102.5 and sbp 70/48 (> 40 SBP drop from baseline)  lactate 1.3 ,s/p 2 L ivf, s/p vanc and zosyn   blood cultures showed NGTD, MRSA neg  on on zosyn- abx changed to oral and will be completed 12/13   SBp improved after IVF and mental status at bs now however pt continues to be at risk for aspiration   discussed in detail with wife regarding GOC and risk of aspiration with continued feeds. wife understands and want to continue with diet.   pt is medically ready for discharge to [facility], [place] pending auth     #dwight-> ATN   2/2 hypotension in the setting of severe sepsis    s/p IVF   cr peaked and trended down from 1.73- 1.14  avoid nephrotoxins     #advanced dementia   #acute metabolic encephalopathy on chronic advanced dementia   #failed dysphagia screen  s/s rec soft diet 12/7 --> however mental status deteriorated after aspiration. pt was NPO till 12/12.   Mental status at bs now. Diet resumed after discussion w family and s/s re-eval        #functional quadriplegia/ ambulatory dysfunction   PT recs [facility], [place], pending auth     #dvt ppx HSQ

## 2022-12-13 NOTE — PROGRESS NOTE ADULT - ASSESSMENT
per Internal Medicine    88 y o M PMH dementia, colon cancer (dx 1996 w/ short course of chemo and partial colectomy), BPH, mild anemia p/w weakness. Admitted for decreased ambulation requiring assistance and DWIGHT, w/ cc by sepsis likely 2/2 aspiration vs. UTI.      Problem/Plan - 1:  ·  Problem: Acute sepsis.   ·  Plan: likely 2/2 aspiration pneumonia.     Plan:   - c/w zosyn, today is last day  - need GOC discussion.    Problem/Plan - 2:  ·  Problem: Pneumonia, aspiration.   ·  Plan: WBC 22, not downtrending but fever curve coming down. WIll continue Zosyn for now. Xray in AM concerning for LLL infiltrate, not consistent with aspiration. Procal was elevated.     Plan:  - pending GOC discussion  - NPO per speech and swallow.    Problem/Plan - 3:  ·  Problem: UTI (urinary tract infection).   ·  Plan: No more suprapubic pain this AM, still on Zosyn.     Plan:   - c/w Ab for now.    Problem/Plan - 4:  ·  Problem: Functional weakness.   ·  Plan: Hx Dementia. CTH: Frontotemporal atrophy progressed since 2016. Per wife, decreased ambulation since last admission now requiring assistance, prompting admission. Holding off further evaluation for now i/s/o infection,     Plan:  - palliative following   - MIA recommended.    Problem/Plan - 5:  ·  Problem: DWIGHT (acute kidney injury).   ·  Plan: Now suspected ATN 2/2 episodes of hypotension. Will continue to monitor Cr in coming days/.       Plan:   - gentle hydration, expect Cr to rise further, will watch for post obstructive diuresis when the time comes  - improved today.    Problem/Plan - 6:  ·  Problem: Anemia.   ·  Plan: normocytic anemia (hgb 11, above baseline 10.3).    Plan:   - Active T+S, transfuse if hgb<7.    Problem/Plan - 7:  ·  Problem: Constipation.   ·  Plan: Last admitted July 2022 for constipation, urinary retention.  - started bowel regimen (Senna & Miralax).    Problem/Plan - 8:  ·  Problem: BPH (benign prostatic hyperplasia).   ·  Plan: - c/w home Tamsulosin 0.4mg qhs.    Problem/Plan - 9:  ·  Problem: Healthcare maintenance.   ·  Plan: F: None   E: Replete as necessary K>4 Mg>2  N: NPO for now i/s/o likely aspiration event GOC discussion    DVT Prophylaxis: Lovenox 40mg daily   GI prophylaxis: None   CODE STATUS: FULL.

## 2022-12-13 NOTE — PROGRESS NOTE ADULT - PROBLEM SELECTOR PLAN 2
RESOLVED. Xray in AM concerning for LLL infiltrate, not consistent with aspiration. Procal was elevated.     Plan:  - per GOC conversation and SS recs, patient will continue to eat thin liquids and pureed food and wife who is HCP knows risk of aspiration

## 2022-12-13 NOTE — PROGRESS NOTE ADULT - SUBJECTIVE AND OBJECTIVE BOX
Physical Medicine and Rehabilitation Progress Note :       Patient is a 88y old  Male who presents with a chief complaint of Dementia, FTT, Weakness (13 Dec 2022 12:28)      HPI:  88M PMH dementia, colon cancer (dx 1996 w/ short course of chemo and partial colectomy), BPH, mild anemia p/w weakness. Pt unsure where he is or why he is here. Pt denies any complaints. Last admitted July 2022 for constipation, urinary retention. Per wife at bedside: Ever since last admission pt has been ambulating less - really only able to ambulate at home by holding onto things. Over the last week pt has increasing difficulty getting in and out of bed on his own, slowly worsening, finally prompting today's ED visit. Pt has "low BP" (wife unsure of number) for years. Also decreased appetite for many years. Not on any meds. Pt denies any pain - denies HA, CP, abd pain. Wife denies vomiting, coughing, black/bloody stool, diarrhea. Has HHA 6 hrs/day, 5 days a week    In the ED, VSS other than hypotension 109/63 (78) - at baseline per wife. Labs significant for borderline leukocytosis (WBC 10.6k), normocytic anemia (hgb 11, above baseline 10.3), DWIGHT (BUN/Cr 38/1.73, baseline possible Cr 1.29). CTH: Frontotemporal atrophy progressed since 2016. CXR: NAD. He received 0.5L 0.9%NS bolus. (06 Dec 2022 22:49)                            10.4   11.52 )-----------( 312      ( 12 Dec 2022 14:31 )             31.4       12-12    138  |  106  |  26<H>  ----------------------------<  98  4.2   |  22  |  1.14    Ca    10.0      12 Dec 2022 10:00  Phos  2.8     12-12  Mg     2.9     12-12      Vital Signs Last 24 Hrs  T(C): 37.1 (13 Dec 2022 13:18), Max: 37.9 (12 Dec 2022 13:37)  T(F): 98.8 (13 Dec 2022 13:18), Max: 100.3 (12 Dec 2022 13:37)  HR: 79 (13 Dec 2022 13:18) (61 - 79)  BP: 138/77 (13 Dec 2022 13:18) (120/68 - 149/60)  BP(mean): --  RR: 18 (13 Dec 2022 13:18) (18 - 18)  SpO2: 98% (13 Dec 2022 13:18) (96% - 98%)    Parameters below as of 13 Dec 2022 13:18  Patient On (Oxygen Delivery Method): room air        MEDICATIONS  (STANDING):  aspirin enteric coated 81 milliGRAM(s) Oral daily  heparin   Injectable 5000 Unit(s) SubCutaneous every 8 hours  metroNIDAZOLE    Tablet 500 milliGRAM(s) Oral three times a day  polyethylene glycol 3350 17 Gram(s) Oral daily  tamsulosin 0.4 milliGRAM(s) Oral at bedtime    MEDICATIONS  (PRN):  acetaminophen     Tablet .. 650 milliGRAM(s) Oral every 6 hours PRN Temp greater or equal to 38C (100.4F)         Physical Therapy Functional Status Assessment :   12/12/2022    Cognitive/Neuro/Behavioral  Cognitive/Neuro/Behavioral [WDL Definition: Alert; opens eyes spontaneously; arouses to voice or touch; oriented x 4; follows commands; speech spontaneous, logical; purposeful motor response; behavior appropriate to situation]: WDL except  Level of Consciousness: confused  Arousal Level: arouses to voice  Speech: incoherent    Language Assistance  Preferred Language to Address Healthcare Preferred Language to Address Healthcare: English    Therapeutic Interventions      Bed Mobility  Bed Mobility Training Rolling/Turning: moderate assist (50% patient effort);  maximum assist (25% patient effort);  2 person assist  Bed Mobility Training Scooting: moderate assist (50% patient effort);  2 person assist  Bed Mobility Training Sit-to-Supine: moderate assist (50% patient effort);  minimum assist (75% patient effort);  2 person assist  Bed Mobility Training Supine-to-Sit: moderate assist (50% patient effort);  2 person assist  Bed Mobility Training Limitations: decreased ability to use arms for pushing/pulling;  decreased ability to use legs for bridging/pushing;  impaired ability to control trunk for mobility;  decreased strength;  impaired balance;  impaired postural control    Sit-Stand Transfer Training  Transfer Training Sit-to-Stand Transfer: maximum assist (25% patient effort);  2 person assist;  HHA x2  Transfer Training Stand-to-Sit Transfer: moderate assist (50% patient effort);  2 person assist;  HHAx2  Sit-to-Stand Transfer Training Transfer Safety Analysis: decreased balance;  decreased weight-shifting ability;  decreased strength;  impaired balance;  impaired postural control;  HHAx2    Gait Training  Gait Training: maximum assist (25% patient effort);  2 person assist;  HHAx2;  6 steps EOB  Gait Analysis: 3-point gait   crouch;  retropulsion;  shuffling;  decreased weight-shifting ability;  decreased strength;  impaired balance;  impaired postural control;  6 steps EOB;  HHAx2            PM&R Impression : as above    Current Disposition Plan Recommendations :    subacute rehab placement

## 2022-12-13 NOTE — PROGRESS NOTE ADULT - SUBJECTIVE AND OBJECTIVE BOX
Subjective/ROS: Patient seen and examined at bedside, appears well today. Able to have a short but full conversation this morning. Patient denies fevers/chills, HA, CP, SOB, n/v, changes in bowel/urinary habits.  12pt ROS otherwise negative.    VITALS  Vital Signs Last 24 Hrs  T(C): 37.1 (13 Dec 2022 13:18), Max: 37.1 (12 Dec 2022 20:55)  T(F): 98.8 (13 Dec 2022 13:18), Max: 98.8 (13 Dec 2022 13:18)  HR: 79 (13 Dec 2022 13:18) (74 - 79)  BP: 138/77 (13 Dec 2022 13:18) (120/68 - 138/77)  BP(mean): --  RR: 18 (13 Dec 2022 13:18) (18 - 18)  SpO2: 98% (13 Dec 2022 13:18) (97% - 98%)    Parameters below as of 13 Dec 2022 13:18  Patient On (Oxygen Delivery Method): room air        CAPILLARY BLOOD GLUCOSE      POCT Blood Glucose.: 99 mg/dL (13 Dec 2022 12:18)  POCT Blood Glucose.: 108 mg/dL (13 Dec 2022 08:34)  POCT Blood Glucose.: 109 mg/dL (13 Dec 2022 00:29)  POCT Blood Glucose.: 129 mg/dL (12 Dec 2022 19:04)      PHYSICAL EXAM  General: NAD  Head: NC/AT; MMM; PERRL; EOMI;  Neck: Supple; no JVD  Respiratory: CTAB; no wheezes/rales/rhonchi  Cardiovascular: Regular rhythm/rate; S1/S2+, no murmurs, rubs gallops   Gastrointestinal: Soft; NTND; bowel sounds normal and present  Extremities: WWP; no edema/cyanosis  Neurological: A&Ox1, CNII-XII grossly intact; no obvious focal deficits    MEDICATIONS  (STANDING):  aspirin enteric coated 81 milliGRAM(s) Oral daily  heparin   Injectable 5000 Unit(s) SubCutaneous every 8 hours  metroNIDAZOLE    Tablet 500 milliGRAM(s) Oral three times a day  polyethylene glycol 3350 17 Gram(s) Oral daily  tamsulosin 0.4 milliGRAM(s) Oral at bedtime    MEDICATIONS  (PRN):  acetaminophen     Tablet .. 650 milliGRAM(s) Oral every 6 hours PRN Temp greater or equal to 38C (100.4F)      No Known Allergies      LABS                        10.4   11.52 )-----------( 312      ( 12 Dec 2022 14:31 )             31.4     12-12    138  |  106  |  26<H>  ----------------------------<  98  4.2   |  22  |  1.14    Ca    10.0      12 Dec 2022 10:00  Phos  2.8     12-12  Mg     2.9     12-12                  IMAGING/EKG/ETC   ======HOSPITAL COURSE===========    89 yo male with dementia AAX0-1 at baseline brought in by wife for functional weakness at home, admitted for DWIGHT and MIA placement. CC by likely aspiration pnuemonia and UTI, patient treated with 5 day course of Zosyn then Cefpodoxime + Flagyl. Per GOC discussion, resumed feeds with puree diet with wife understand the risks of aspiration. Patient treatment course is completed and he is medically stable for DC but is pending MIA placement as wife did not like the one that had been offered and social is working on auth for the new one. Otherwise, no more workup pending. No more labs.     Subjective/ROS: Patient seen and examined at bedside, appears well today. Able to have a short but full conversation this morning. Patient denies fevers/chills, HA, CP, SOB, n/v, changes in bowel/urinary habits.  12pt ROS otherwise negative.    VITALS  Vital Signs Last 24 Hrs  T(C): 37.1 (13 Dec 2022 13:18), Max: 37.1 (12 Dec 2022 20:55)  T(F): 98.8 (13 Dec 2022 13:18), Max: 98.8 (13 Dec 2022 13:18)  HR: 79 (13 Dec 2022 13:18) (74 - 79)  BP: 138/77 (13 Dec 2022 13:18) (120/68 - 138/77)  BP(mean): --  RR: 18 (13 Dec 2022 13:18) (18 - 18)  SpO2: 98% (13 Dec 2022 13:18) (97% - 98%)    Parameters below as of 13 Dec 2022 13:18  Patient On (Oxygen Delivery Method): room air        CAPILLARY BLOOD GLUCOSE      POCT Blood Glucose.: 99 mg/dL (13 Dec 2022 12:18)  POCT Blood Glucose.: 108 mg/dL (13 Dec 2022 08:34)  POCT Blood Glucose.: 109 mg/dL (13 Dec 2022 00:29)  POCT Blood Glucose.: 129 mg/dL (12 Dec 2022 19:04)      PHYSICAL EXAM  General: NAD  Head: NC/AT; MMM; PERRL; EOMI;  Neck: Supple; no JVD  Respiratory: CTAB; no wheezes/rales/rhonchi  Cardiovascular: Regular rhythm/rate; S1/S2+, no murmurs, rubs gallops   Gastrointestinal: Soft; NTND; bowel sounds normal and present  Extremities: WWP; no edema/cyanosis  Neurological: A&Ox1, CNII-XII grossly intact; no obvious focal deficits    MEDICATIONS  (STANDING):  aspirin enteric coated 81 milliGRAM(s) Oral daily  heparin   Injectable 5000 Unit(s) SubCutaneous every 8 hours  metroNIDAZOLE    Tablet 500 milliGRAM(s) Oral three times a day  polyethylene glycol 3350 17 Gram(s) Oral daily  tamsulosin 0.4 milliGRAM(s) Oral at bedtime    MEDICATIONS  (PRN):  acetaminophen     Tablet .. 650 milliGRAM(s) Oral every 6 hours PRN Temp greater or equal to 38C (100.4F)      No Known Allergies      LABS                        10.4   11.52 )-----------( 312      ( 12 Dec 2022 14:31 )             31.4     12-12    138  |  106  |  26<H>  ----------------------------<  98  4.2   |  22  |  1.14    Ca    10.0      12 Dec 2022 10:00  Phos  2.8     12-12  Mg     2.9     12-12                  IMAGING/EKG/ETC

## 2022-12-13 NOTE — CHART NOTE - NSCHARTNOTEFT_GEN_A_CORE
Admitting Diagnosis:   Patient is a 88y old  Male who presents with a chief complaint of Dementia, FTT, Weakness (13 Dec 2022 13:31)    PAST MEDICAL & SURGICAL HISTORY:  Sepsis  2/2 UTI- December 2013  Colon cancer  BPH (benign prostatic hyperplasia)  Brain TIA  S/P colon resection    Current Nutrition Order:   Diet, Pureed (12-12-22 @ 18:06)    PO Intake: Good (%) [   ]  Fair (50-75%) [  x ] Poor (<25%) [   ]    GI Issues:   no n/v, pt with noted fecal incontinence   last +BM 12/12  no noted distention/discomfort     Pain: nonverbal indicators absent     Skin Integrity:  Naveen 13  bilateral leg edema 1+   no pressure injuries     Labs:   12-12    138  |  106  |  26<H>  ----------------------------<  98  4.2   |  22  |  1.14    Ca    10.0      12 Dec 2022 10:00  Phos  2.8     12-12  Mg     2.9     12-12      CAPILLARY BLOOD GLUCOSE  POCT Blood Glucose.: 99 mg/dL (13 Dec 2022 12:18)  POCT Blood Glucose.: 108 mg/dL (13 Dec 2022 08:34)  POCT Blood Glucose.: 109 mg/dL (13 Dec 2022 00:29)  POCT Blood Glucose.: 129 mg/dL (12 Dec 2022 19:04)      Medications:  MEDICATIONS  (STANDING):  aspirin enteric coated 81 milliGRAM(s) Oral daily  heparin   Injectable 5000 Unit(s) SubCutaneous every 8 hours  metroNIDAZOLE    Tablet 500 milliGRAM(s) Oral three times a day  polyethylene glycol 3350 17 Gram(s) Oral daily  tamsulosin 0.4 milliGRAM(s) Oral at bedtime    MEDICATIONS  (PRN):  acetaminophen     Tablet .. 650 milliGRAM(s) Oral every 6 hours PRN Temp greater or equal to 38C (100.4F)    Admitted Anthropometrics:   Ht: 6'0  Wt: 157lbs   BMI: 21.3   IBW: 178lbs   %IBW: 88    Weight Change: No new wts since admission, please continue to trend wts weekly to assess for changes.     Nutrition Focused Physical Exam: Completed [ x  ]  Not Pertinent [   ] please see RD note from 12/8     Estimated energy needs:   kcal: 1780-2136kcal/day (based on 25-30kcal/kg)   pro: 89-107g/day (based on 1.25-1.5g/kg)   mL: 2136-2492mL/day (based on 30-35mL/kg)   **%IBW: 88; ABW used to calculate estimated needs d/t pt being between 80 and 120%IBW. Adjusted for advanced age, PCM.    Subjective: 88M PMH dementia, colon cancer (dx 1996 w/ short course of chemo and partial colectomy), BPH, mild anemia p/w weakness. Admitted for decreased ambulation requiring assistance, cc by UTI and possible asp. pnuemonia, on Zosyn. diet advanced to pureed diet after GOC discussion with family. MDs discussed in detail with wife regarding GOC and risk of aspiration with continued feeds. wife understands and want to continue with diet.     Pt seen at bedside for followup. Assessment limited, information obtained from PCA/RN. Denies nausea/vomiting at this time. Reports last bowel movement 12/12. Edu deferred. RN reports good PO intake, able to complete 75% of meals since diet advancement. Made aware RD remains available. RD to follow up. See nutrition recommendations below     Previous Nutrition Diagnosis: Malnutrition... moderate PCM r/t suspected poor PO intake 2/2 dementia AEB NFPE, suspect PO intake <75% EER >1month    Active [  x ]  Resolved [   ]    Goal: Pt to consistently meet >75% of EER during hospital stay and show no further signs/symptoms of malnutrition     Recommendations:  1. Continue with regular diet.  texture per team/SLP/pending GOC  >>Recommend add ensure enlive BID (700kcal, 40g pro) if within GOC   2. Monitor %PO intake, diet tolerance.   >>Encourage PO intake as appropriate; assist with meals as needed    3. Monitor lytes, replete prn. Monitor BG.   4. Trend weekly wts. Monitor skin integrity, s/sx GI distress.   5. Pain/BM regimen per team   6. RD diet edu reinforcement prn.  7. Align nutrition interventions with GOC at all times    8. RD to remain available for additional nutrition interventions as needed.     Education: deferred     Risk Level: High [   ] Moderate [ x  ] Low [   ] Admitting Diagnosis:   Patient is a 88y old  Male who presents with a chief complaint of Dementia, FTT, Weakness (13 Dec 2022 13:31)    PAST MEDICAL & SURGICAL HISTORY:  Sepsis  2/2 UTI- December 2013  Colon cancer  BPH (benign prostatic hyperplasia)  Brain TIA  S/P colon resection    Current Nutrition Order:   Diet, Pureed (12-12-22 @ 18:06)    PO Intake: Good (%) [   ]  Fair (50-75%) [  x ] Poor (<25%) [   ]    GI Issues:   no n/v, pt with noted fecal incontinence   last +BM 12/12  no noted distention/discomfort     Pain: nonverbal indicators absent     Skin Integrity:  Naveen 13  bilateral leg edema 1+   no pressure injuries     Labs:   12-12    138  |  106  |  26<H>  ----------------------------<  98  4.2   |  22  |  1.14    Ca    10.0      12 Dec 2022 10:00  Phos  2.8     12-12  Mg     2.9     12-12      CAPILLARY BLOOD GLUCOSE  POCT Blood Glucose.: 99 mg/dL (13 Dec 2022 12:18)  POCT Blood Glucose.: 108 mg/dL (13 Dec 2022 08:34)  POCT Blood Glucose.: 109 mg/dL (13 Dec 2022 00:29)  POCT Blood Glucose.: 129 mg/dL (12 Dec 2022 19:04)      Medications:  MEDICATIONS  (STANDING):  aspirin enteric coated 81 milliGRAM(s) Oral daily  heparin   Injectable 5000 Unit(s) SubCutaneous every 8 hours  metroNIDAZOLE    Tablet 500 milliGRAM(s) Oral three times a day  polyethylene glycol 3350 17 Gram(s) Oral daily  tamsulosin 0.4 milliGRAM(s) Oral at bedtime    MEDICATIONS  (PRN):  acetaminophen     Tablet .. 650 milliGRAM(s) Oral every 6 hours PRN Temp greater or equal to 38C (100.4F)    Admitted Anthropometrics:   Ht: 6'0  Wt: 157lbs   BMI: 21.3   IBW: 178lbs   %IBW: 88    Weight Change: No new wts since admission, please continue to trend wts weekly to assess for changes.     Nutrition Focused Physical Exam: Completed [ x  ]  Not Pertinent [   ] please see RD note from 12/8     Estimated energy needs:   kcal: 1780-2136kcal/day (based on 25-30kcal/kg)   pro: 89-107g/day (based on 1.25-1.5g/kg)   mL: 2136-2492mL/day (based on 30-35mL/kg)   **%IBW: 88; ABW used to calculate estimated needs d/t pt being between 80 and 120%IBW. Adjusted for advanced age, PCM.    Subjective: 88M PMH dementia, colon cancer (dx 1996 w/ short course of chemo and partial colectomy), BPH, mild anemia p/w weakness. Admitted for decreased ambulation requiring assistance, cc by UTI and possible asp. pnuemonia, on Zosyn. diet advanced to pureed diet after GOC discussion with family. MDs discussed in detail with wife regarding GOC and risk of aspiration with continued feeds. wife understands and want to continue with diet.     Pt seen at bedside for followup. Assessment limited, information obtained from PCA/RN. Denies nausea/vomiting at this time. Reports last bowel movement 12/12. Edu deferred. RN reports good PO intake, able to complete 75% of meals since diet advancement. Made aware RD remains available. RD to follow up. See nutrition recommendations below     Previous Nutrition Diagnosis: Malnutrition... moderate PCM r/t suspected poor PO intake 2/2 dementia AEB NFPE, suspect PO intake <75% EER >1month    Active [  x ]  Resolved [   ]    Goal: Pt to consistently meet >75% of EER during hospital stay and show no further signs/symptoms of malnutrition     Recommendations:  1. Continue with regular diet.  texture per team/SLP/pending GOC  >>Recommend add ensure enlive BID (700kcal, 40g pro) if within GOC   2. Monitor %PO intake, diet tolerance.   >>Encourage PO intake as appropriate; assist with meals as needed    3. Monitor lytes, replete prn. Monitor BG.   4. Trend weekly wts. Monitor skin integrity, s/sx GI distress.   5. Pain/BM regimen per team   6. RD diet edu as apropriate   7. Align nutrition interventions with GOC at all times    8. RD to remain available for additional nutrition interventions as needed.     Education: deferred     Risk Level: High [   ] Moderate [ x  ] Low [   ]

## 2022-12-14 NOTE — PROGRESS NOTE ADULT - SUBJECTIVE AND OBJECTIVE BOX
RAMSES LOPES  88y  Male    Patient is a 88y old  Male who presents with a chief complaint of Dementia, FTT, Weakness (14 Dec 2022 08:40)      INTERVAL HPI/OVERNIGHT EVENTS:    ROS: fever/chills, fatigue, nausea, vomiting, headache, stuffiness, sore throat, chest pain, palpitations, edema, SOB, cough, wheezing, changes in appetite, changes in bowel movements, contipation, diarrhea, abdominal pain, dizziness, fainting/LOC      T(C): 36.4 (12-14-22 @ 05:30), Max: 37.1 (12-13-22 @ 13:18)  HR: 81 (12-14-22 @ 05:30) (68 - 81)  BP: 123/62 (12-14-22 @ 05:30) (123/62 - 151/82)  RR: 19 (12-14-22 @ 05:30) (18 - 19)  SpO2: 96% (12-14-22 @ 05:30) (96% - 98%)  Wt(kg): --Vital Signs Last 24 Hrs  T(C): 36.4 (14 Dec 2022 05:30), Max: 37.1 (13 Dec 2022 13:18)  T(F): 97.6 (14 Dec 2022 05:30), Max: 98.8 (13 Dec 2022 13:18)  HR: 81 (14 Dec 2022 05:30) (68 - 81)  BP: 123/62 (14 Dec 2022 05:30) (123/62 - 151/82)  BP(mean): --  RR: 19 (14 Dec 2022 05:30) (18 - 19)  SpO2: 96% (14 Dec 2022 05:30) (96% - 98%)    Parameters below as of 14 Dec 2022 05:30  Patient On (Oxygen Delivery Method): room air        PHYSICAL EXAM:  GENERAL: NAD; well-groomed; well-developed; AAO x 3; good concentration   Neuro: CN2-12 grossly intact; speech clear; +2/4 DTR in UE and LE b/l; light touch sensation intact of UE and le b/l;  negative Romberg test; no pronator drift; intact tandem gait; intact heel and toe walking; intact finger to nose testing; intact rapid alternating movements  HEENT: NC/AT; MMM; neck supple; good dentition; no nystagmus; no scleral icterus; nasal passages clear; no throid or LN enlargement  Heart: RRR; +s1 and s2; no MRG (or grade 2 _ murmur appreciated at _ ICS); non displaced PMI; no JVD  Lungs: CTA b/l; normal effort; no accesory muscle use; symmetric inhalation and exhalation; vesicular breath sounds; no WWR; normal tactile fremitus; persussion resonant  GI: nondistended; nontender; normal bowel sounds [id number]; percussion typmanic; no organomegaly   Extremities: +2 pulses in UE and LE b/l; no clubbing, cyanosis, or edema b/l, capillary refill <2 sec b/l  Skin: skin dry and warm; no skin tenting; no rashes or lesions  MSK: normal tone; +5/5 strength in upper and lower extremities b/l    Consultant(s) Notes Reviewed:  [x ] YES  [ ] NO  Care Discussed with Consultants/Other Providers [ x] YES  [ ] NO    LABS:                        10.4   11.52 )-----------( 312      ( 12 Dec 2022 14:31 )             31.4     12-12    138  |  106  |  26<H>  ----------------------------<  98  4.2   |  22  |  1.14    Ca    10.0      12 Dec 2022 10:00  Phos  2.8     12-12  Mg     2.9     12-12            CAPILLARY BLOOD GLUCOSE      POCT Blood Glucose.: 105 mg/dL (14 Dec 2022 08:45)  POCT Blood Glucose.: 153 mg/dL (13 Dec 2022 21:51)  POCT Blood Glucose.: 131 mg/dL (13 Dec 2022 17:08)  POCT Blood Glucose.: 99 mg/dL (13 Dec 2022 12:18)            MEDICATIONS  (STANDING):  aspirin enteric coated 81 milliGRAM(s) Oral daily  heparin   Injectable 5000 Unit(s) SubCutaneous every 8 hours  polyethylene glycol 3350 17 Gram(s) Oral daily  tamsulosin 0.4 milliGRAM(s) Oral at bedtime    MEDICATIONS  (PRN):  acetaminophen     Tablet .. 650 milliGRAM(s) Oral every 6 hours PRN Temp greater or equal to 38C (100.4F)      RADIOLOGY & ADDITIONAL TESTS:    Imaging Personally Reviewed:  [ ] YES  [ ] NO   RAMSES LOPES  88y  Male    Patient is a 88y old  Male who presents with a chief complaint of Dementia, FTT, Weakness (14 Dec 2022 08:40)      INTERVAL HPI/OVERNIGHT EVENTS: As per night team, no events. Patient was seen and examined at bedside. Patient denies any urinary symptoms. As per nursing team, patient does not like primafit and pulls it off; is incontinent most of the time. Patient denies any trouble breathing.   Exam limited 2/2 to mental status      T(C): 36.4 (12-14-22 @ 05:30), Max: 37.1 (12-13-22 @ 13:18)  HR: 81 (12-14-22 @ 05:30) (68 - 81)  BP: 123/62 (12-14-22 @ 05:30) (123/62 - 151/82)  RR: 19 (12-14-22 @ 05:30) (18 - 19)  SpO2: 96% (12-14-22 @ 05:30) (96% - 98%)  Wt(kg): --Vital Signs Last 24 Hrs  T(C): 36.4 (14 Dec 2022 05:30), Max: 37.1 (13 Dec 2022 13:18)  T(F): 97.6 (14 Dec 2022 05:30), Max: 98.8 (13 Dec 2022 13:18)  HR: 81 (14 Dec 2022 05:30) (68 - 81)  BP: 123/62 (14 Dec 2022 05:30) (123/62 - 151/82)  BP(mean): --  RR: 19 (14 Dec 2022 05:30) (18 - 19)  SpO2: 96% (14 Dec 2022 05:30) (96% - 98%)    Parameters below as of 14 Dec 2022 05:30  Patient On (Oxygen Delivery Method): room air        PHYSICAL EXAM:  General: NAD  Head: NC/AT; MMM; PERRL; EOMI;  Neck: Supple; no JVD  Respiratory: CTAB; no wheezes/rales/rhonchi  Cardiovascular: Regular rhythm/rate; S1/S2+, no murmurs, rubs gallops   Gastrointestinal: Soft; NTND; bowel sounds normal and present  Extremities: WWP; no edema/cyanosis  Neurological: A&Ox1, CNII-XII grossly intact; no obvious focal deficits    Consultant(s) Notes Reviewed:  [x ] YES  [ ] NO  Care Discussed with Consultants/Other Providers [ x] YES  [ ] NO    LABS:                        10.4   11.52 )-----------( 312      ( 12 Dec 2022 14:31 )             31.4     12-12    138  |  106  |  26<H>  ----------------------------<  98  4.2   |  22  |  1.14    Ca    10.0      12 Dec 2022 10:00  Phos  2.8     12-12  Mg     2.9     12-12            CAPILLARY BLOOD GLUCOSE      POCT Blood Glucose.: 105 mg/dL (14 Dec 2022 08:45)  POCT Blood Glucose.: 153 mg/dL (13 Dec 2022 21:51)  POCT Blood Glucose.: 131 mg/dL (13 Dec 2022 17:08)  POCT Blood Glucose.: 99 mg/dL (13 Dec 2022 12:18)            MEDICATIONS  (STANDING):  aspirin enteric coated 81 milliGRAM(s) Oral daily  heparin   Injectable 5000 Unit(s) SubCutaneous every 8 hours  polyethylene glycol 3350 17 Gram(s) Oral daily  tamsulosin 0.4 milliGRAM(s) Oral at bedtime    MEDICATIONS  (PRN):  acetaminophen     Tablet .. 650 milliGRAM(s) Oral every 6 hours PRN Temp greater or equal to 38C (100.4F)      RADIOLOGY & ADDITIONAL TESTS:    Imaging Personally Reviewed:  [ ] YES  [ ] NO

## 2022-12-14 NOTE — PROGRESS NOTE ADULT - ASSESSMENT
88M PMH dementia, colon cancer (dx 1996 w/ short course of chemo and partial colectomy), BPH, mild anemia p/w weakness. Admitted for decreased ambulation requiring assistance and DWIGHT, w/ cc by sepsis likely 2/2 aspiration vs. UTI.   Patient currently awaiting MIA

## 2022-12-14 NOTE — PROGRESS NOTE ADULT - ATTENDING COMMENTS
88M PMH dementia, colon cancer (dx 1996 w/ short course of chemo and partial colectomy), BPH, mild anemia p/w weakness. Admitted for decreased ambulation requiring assistance and DWIGHT, w/ cc by sepsis likely 2/2 aspiration vs. UTI.   Patient currently awaiting MIA      Labs and images reviewed    Problem List:   #Severe Sepsis POA - resolved  #Gram-negative PNA - resolved  #Acute Metabolic Encephalopathy - resolved  #ATN - improved  #Adv Dementia   #Dysphagia - supervised eating     Rest of problems as above, medically optimized for MIA pending authorization

## 2022-12-14 NOTE — PROGRESS NOTE ADULT - PROBLEM SELECTOR PLAN 2
WBC 22, not downtrending but fever curve coming down. WIll continue Zosyn for now. Xray in AM concerning for LLL infiltrate, not consistent with aspiration. Procal was elevated.     Plan:  - pending GOC discussion  - patient on pureed food, family aware of aspiration risk

## 2022-12-15 NOTE — PROGRESS NOTE ADULT - NUTRITIONAL ASSESSMENT
This patient has been assessed with a concern for Malnutrition and has been determined to have a diagnosis/diagnoses of Moderate protein-calorie malnutrition.    This patient is being managed with:   Diet NPO-  Entered: Dec  8 2022  9:20AM    
This patient has been assessed with a concern for Malnutrition and has been determined to have a diagnosis/diagnoses of Moderate protein-calorie malnutrition.    This patient is being managed with:   Diet Pureed-  Entered: Dec 12 2022  6:06PM    
This patient has been assessed with a concern for Malnutrition and has been determined to have a diagnosis/diagnoses of Moderate protein-calorie malnutrition.    This patient is being managed with:   Diet NPO-  Entered: Dec  8 2022  9:20AM    
This patient has been assessed with a concern for Malnutrition and has been determined to have a diagnosis/diagnoses of Moderate protein-calorie malnutrition.    This patient is being managed with:   Diet Pureed-  Entered: Dec 12 2022  6:06PM    
This patient has been assessed with a concern for Malnutrition and has been determined to have a diagnosis/diagnoses of Moderate protein-calorie malnutrition.    This patient is being managed with:   Diet Pureed-  Supplement Feeding Modality:  Oral  Ensure Enlive Cans or Servings Per Day:  1       Frequency:  Two Times a day  Entered: Dec 14 2022 11:58AM    
This patient has been assessed with a concern for Malnutrition and has been determined to have a diagnosis/diagnoses of Moderate protein-calorie malnutrition.    This patient is being managed with:   Diet NPO-  Entered: Dec  8 2022  9:20AM    
This patient has been assessed with a concern for Malnutrition and has been determined to have a diagnosis/diagnoses of Moderate protein-calorie malnutrition.    This patient is being managed with:   Diet NPO-  Entered: Dec  8 2022  9:20AM

## 2022-12-15 NOTE — PROGRESS NOTE ADULT - PROBLEM SELECTOR PLAN 9
F: None   E: Replete as necessary K>4 Mg>2  N: Pureed diet     DVT Prophylaxis: Lovenox 40mg daily   GI prophylaxis: None   CODE STATUS: FULL
F: None   E: Replete as necessary K>4 Mg>2  N: NPO for now i/s/o likely aspiration event GOC discussion    DVT Prophylaxis: Lovenox 40mg daily   GI prophylaxis: None   CODE STATUS: FULL

## 2022-12-15 NOTE — PROGRESS NOTE ADULT - ATTENDING COMMENTS
88M PMH dementia, colon cancer (dx 1996 w/ short course of chemo and partial colectomy), BPH, mild anemia p/w weakness. Admitted for decreased ambulation requiring assistance and DWIGHT, w/ cc by sepsis likely 2/2 aspiration vs. UTI.   Patient currently awaiting MIA      Labs and images reviewed    Problem List:   #Severe Sepsis POA - resolved  #Gram-negative PNA - resolved  #Acute Metabolic Encephalopathy - resolved  #ATN - improved  #Adv Dementia   #Dysphagia - supervised eating     Rest of problems as above, medically optimized for MIA pending authorization .

## 2022-12-15 NOTE — PROGRESS NOTE ADULT - PROBLEM SELECTOR PROBLEM 1
Acute sepsis
Functional weakness
Acute sepsis
Acute sepsis

## 2022-12-15 NOTE — PROGRESS NOTE ADULT - PROBLEM SELECTOR PROBLEM 9
Healthcare maintenance

## 2022-12-15 NOTE — PROGRESS NOTE ADULT - PROBLEM SELECTOR PLAN 6
normocytic anemia (hgb 11, above baseline 10.3).    Plan:   - Active T+S, transfuse if hgb<7
normocytic anemia (hgb 11, above baseline 10.3).  - Active T+S, transfuse if hgb<7
normocytic anemia (hgb 11, above baseline 10.3).    Plan:   - Active T+S, transfuse if hgb<7
- c/w home Tamsulosin 0.4mg qhs
F: None   E: Replete as necessary K>4 Mg>2  N: Pending S&S and GOC discussion    DVT Prophylaxis: Lovenox 40mg daily   GI prophylaxis: None   CODE STATUS: FULL
- c/w home Tamsulosin 0.4mg qhs
normocytic anemia (hgb 11, above baseline 10.3).    Plan:   - Active T+S, transfuse if hgb<7
normocytic anemia (hgb 11, above baseline 10.3).    Plan:   - Active T+S, transfuse if hgb<7

## 2022-12-15 NOTE — PROGRESS NOTE ADULT - PROBLEM SELECTOR PROBLEM 3
UTI (urinary tract infection)
DWIGHT (acute kidney injury)
Anemia
DWIGHT (acute kidney injury)

## 2022-12-15 NOTE — PROGRESS NOTE ADULT - SUBJECTIVE AND OBJECTIVE BOX
INTERVAL HPI/OVERNIGHT EVENTS:  Patient was seen and examined at bedside. As per nurse and patient, no o/n events, patient resting comfortably. No complaints at this time. Patient denies: fever, chills, lightheadedness, weakness, CP, palpitations, SOB, cough, N/V. ROS otherwise negative.    VITAL SIGNS:  T(F): 98.1 (12-15-22 @ 06:15)  HR: 66 (12-15-22 @ 06:15)  BP: 161/67 (12-15-22 @ 06:15)  RR: 18 (12-15-22 @ 06:15)  SpO2: 97% (12-15-22 @ 06:15)  Wt(kg): --        PHYSICAL EXAM:    Constitutional: resting comfortably in bed; NAD  HEENT: NC/AT, PER, anicteric sclera, no nasal discharge; MMM  Neck: supple; no JVD or thyromegaly  Respiratory: CTA B/L; no W/R/R, no retractions  Cardiac: +S1/S2; RRR; no M/R/G  Gastrointestinal: soft, NT/ND; no rebound or guarding  Extremities: WWP, no clubbing or cyanosis; no peripheral edema  Musculoskeletal: NROM x4; no joint swelling, tenderness or erythema  Vascular: 2+ radial, DP/PT pulses B/L  Neurologic: AAOx1; CNII-XII grossly intact; no focal deficits; word finding difficulty    MEDICATIONS  (STANDING):  aspirin enteric coated 81 milliGRAM(s) Oral daily  heparin   Injectable 5000 Unit(s) SubCutaneous every 8 hours  polyethylene glycol 3350 17 Gram(s) Oral daily  tamsulosin 0.4 milliGRAM(s) Oral at bedtime    MEDICATIONS  (PRN):  acetaminophen     Tablet .. 650 milliGRAM(s) Oral every 6 hours PRN Temp greater or equal to 38C (100.4F)      Allergies    No Known Allergies    Intolerances        LABS:                RADIOLOGY & ADDITIONAL TESTS:  Reviewed

## 2022-12-15 NOTE — PROGRESS NOTE ADULT - PROBLEM SELECTOR PROBLEM 6
Anemia
BPH (benign prostatic hyperplasia)
Anemia
Healthcare maintenance
Anemia
BPH (benign prostatic hyperplasia)

## 2022-12-15 NOTE — PROGRESS NOTE ADULT - PROBLEM SELECTOR PLAN 3
No more suprapubic pain this AM. Zosyn complete
normocytic anemia (hgb 11, above baseline 10.3).    Plan:   - Active T+S, transfuse if hgb<7
No more suprapubic pain this AM, still on Zosyn.     Plan:   - c/w Ab for now
Now suspected ATN 2/2 episodes of hypotension. Will continue to monitor Cr in coming days/.       Plan:   - gentle hydration, expect Cr to rise further, will watch for post obstructive diuresis when the time comes
Now suspected ATN 2/2 episodes of hypotension. Will continue to monitor Cr in coming days/.       Plan:   - gentle hydration, expect Cr to rise further, will watch for post obstructive diuresis when the time comes
No more suprapubic pain this AM, finished course of Ab.
No more suprapubic pain this AM. Zosyn complete
PAtient able to express dysuria this mornign with wife at bedside.     Plan:   - c/w Ab for now

## 2022-12-15 NOTE — PROGRESS NOTE ADULT - PROBLEM SELECTOR PROBLEM 7
Constipation
Constipation
Healthcare maintenance
Constipation
Healthcare maintenance
Constipation
Constipation

## 2022-12-15 NOTE — PROGRESS NOTE ADULT - PROBLEM SELECTOR PLAN 8
- c/w home Tamsulosin 0.4mg qhs
- c/w home Tamsulosin 0.8mg qhs
- c/w home Tamsulosin 0.4mg qhs
- c/w home Tamsulosin 0.8mg qhs
- c/w home Tamsulosin 0.4mg qhs

## 2022-12-15 NOTE — PROGRESS NOTE ADULT - PROBLEM SELECTOR PROBLEM 4
Anemia
Functional weakness
Functional weakness
Anemia
Constipation
Functional weakness

## 2022-12-15 NOTE — PROGRESS NOTE ADULT - PROBLEM SELECTOR PROBLEM 5
BPH (benign prostatic hyperplasia)
Constipation
Constipation
DWIGHT (acute kidney injury)

## 2022-12-15 NOTE — PROGRESS NOTE ADULT - PROBLEM SELECTOR PLAN 7
Last admitted July 2022 for constipation, urinary retention.  - started bowel regimen (Senna & Miralax)
Last admitted July 2022 for constipation, urinary retention.  - started bowel regimen (Senna & Miralax)
F: None   E: Replete as necessary K>4 Mg>2  N: NPO for now i/s/o likely aspiration event GOC discussion    DVT Prophylaxis: Lovenox 40mg daily   GI prophylaxis: None   CODE STATUS: FULL
Last admitted July 2022 for constipation, urinary retention.  - started bowel regimen (Senna & Miralax)
F: None   E: Replete as necessary K>4 Mg>2  N: NPO for now i/s/o likely aspiration event GOC discussion    DVT Prophylaxis: Lovenox 40mg daily   GI prophylaxis: None   CODE STATUS: FULL

## 2022-12-15 NOTE — PROGRESS NOTE ADULT - PROBLEM SELECTOR PLAN 1
Hx Dementia. CTH: Frontotemporal atrophy progressed since 2016. Per wife, decreased ambulation since last admission now requiring assistance, prompting admission.    Plan:  - PT eval - recommending MIA, will need to have GOC discussion with wife  - Consider Palliative consult
RESOLVED  likely 2/2 aspiration pneumonia.   - Zosyn complete
likely 2/2 aspiration pneumonia.     Plan:   - c/w zosyn, today is last day  - need GOC discussion
RESOLVED  likely 2/2 aspiration pneumonia.   - Zosyn complete
RESOLVED.     likely 2/2 aspiration pneumonia.     Plan:   - c/w zosyn, today is last day  - need GOC discussion
Patient septic overnight, meeting criteria again this morning with altered mentation. Labs all drawn, UA, BCx, xray. Lactate wnl, Cr worsening, was given 2L fluid and was started on broad spectrum Ab. DDx includes aspiration pneumonia vs UTI, though likely aspriation given age and new diet yesterday.     Plan:   - c/w vanc and zosyn   - deescalate as able  - monitor BP closely   - improved with fluid resucitation
likely 2/2 aspiration pneumonia.     Plan:   - c/w zosyn   - switch to ceftriaxone on 12/10  -  reevaluation and GOC discussions
likely 2/2 aspiration pneumonia.     Plan:   - c/w zosyn   - WBC has not yet downtrended, will continue with broad spectrum  - SS reevaluation and GOC discussions

## 2022-12-15 NOTE — PROGRESS NOTE ADULT - PROBLEM SELECTOR PROBLEM 2
Pneumonia, aspiration
Functional weakness
DWIGHT (acute kidney injury)
Pneumonia, aspiration
Pneumonia, aspiration
Functional weakness
Pneumonia, aspiration
Pneumonia, aspiration

## 2022-12-15 NOTE — PROGRESS NOTE ADULT - PROBLEM SELECTOR PLAN 4
Hx Dementia. CTH: Frontotemporal atrophy progressed since 2016. Per wife, decreased ambulation since last admission now requiring assistance, prompting admission. Holding off further evaluation for now i/s/o infection,   - palliative following   - MIA recommended
Last admitted July 2022 for constipation, urinary retention.  - started bowel regimen (Senna & Miralax)
normocytic anemia (hgb 11, above baseline 10.3).    Plan:   - Active T+S, transfuse if hgb<7
Hx Dementia. CTH: Frontotemporal atrophy progressed since 2016. Per wife, decreased ambulation since last admission now requiring assistance, prompting admission. Holding off further evaluation for now i/s/o infection,     Plan:  - palliative following   - MIA recommended
normocytic anemia (hgb 11, above baseline 10.3).    Plan:   - Active T+S, transfuse if hgb<7
Hx Dementia. CTH: Frontotemporal atrophy progressed since 2016. Per wife, decreased ambulation since last admission now requiring assistance, prompting admission. Holding off further evaluation for now i/s/o infection,     Plan:  - MIA recommended

## 2022-12-15 NOTE — PROGRESS NOTE ADULT - PROBLEM SELECTOR PLAN 5
Now suspected ATN 2/2 episodes of hypotension. Will continue to monitor Cr in coming days/.       Plan:   - gentle hydration, expect Cr to rise further, will watch for post obstructive diuresis when the time comes
- c/w home Tamsulosin 0.4mg qhs
Now suspected ATN 2/2 episodes of hypotension. Will continue to monitor Cr in coming days/.       Plan:   - gentle hydration, expect Cr to rise further, will watch for post obstructive diuresis when the time comes  - improved today
Last admitted July 2022 for constipation, urinary retention.  - started bowel regimen (Senna & Miralax)
Now suspected ATN 2/2 episodes of hypotension. Will continue to monitor Cr in coming days/.       Plan:   - gentle hydration, expect Cr to rise further, will watch for post obstructive diuresis when the time comes  - improved today
Now suspected ATN 2/2 episodes of hypotension. Will continue to monitor Cr in coming days/.   - gentle hydration, expect Cr to rise further, will watch for post obstructive diuresis when the time comes  - improved today
RESOLVED.       Plan:   encourage PO intake as food intake now resumed. Looks euvolemic on exam.
Last admitted July 2022 for constipation, urinary retention.  - started bowel regimen (Senna & Miralax)

## 2022-12-15 NOTE — PROGRESS NOTE ADULT - PROBLEM SELECTOR PLAN 2
WBC 22, not downtrending but fever curve coming down. WIll continue Zosyn for now. Xray in AM concerning for LLL infiltrate, not consistent with aspiration. Procal was elevated.   - pending GOC discussion  - patient on pureed food, family aware of aspiration risk

## 2022-12-16 NOTE — DISCHARGE NOTE NURSING/CASE MANAGEMENT/SOCIAL WORK - PATIENT PORTAL LINK FT
You can access the FollowMyHealth Patient Portal offered by [facility] by registering at the following website: [email or web address]. By joining [employer]’s FollowMyHealth portal, you will also be able to view your health information using other applications (apps) compatible with our system.

## 2022-12-16 NOTE — PROGRESS NOTE ADULT - SUBJECTIVE AND OBJECTIVE BOX
Physical Medicine and Rehabilitation Progress Note :       Patient is a 88y old  Male who presents with a chief complaint of Dementia, FTT, Weakness (15 Dec 2022 07:02)      HPI:  88M PMH dementia, colon cancer (dx 1996 w/ short course of chemo and partial colectomy), BPH, mild anemia p/w weakness. Pt unsure where he is or why he is here. Pt denies any complaints. Last admitted July 2022 for constipation, urinary retention. Per wife at bedside: Ever since last admission pt has been ambulating less - really only able to ambulate at home by holding onto things. Over the last week pt has increasing difficulty getting in and out of bed on his own, slowly worsening, finally prompting today's ED visit. Pt has "low BP" (wife unsure of number) for years. Also decreased appetite for many years. Not on any meds. Pt denies any pain - denies HA, CP, abd pain. Wife denies vomiting, coughing, black/bloody stool, diarrhea. Has HHA 6 hrs/day, 5 days a week    In the ED, VSS other than hypotension 109/63 (78) - at baseline per wife. Labs significant for borderline leukocytosis (WBC 10.6k), normocytic anemia (hgb 11, above baseline 10.3), DWIGHT (BUN/Cr 38/1.73, baseline possible Cr 1.29). CTH: Frontotemporal atrophy progressed since 2016. CXR: NAD. He received 0.5L 0.9%NS bolus. (06 Dec 2022 22:49)                Vital Signs Last 24 Hrs  T(C): 36.3 (16 Dec 2022 06:29), Max: 37.2 (15 Dec 2022 21:05)  T(F): 97.3 (16 Dec 2022 06:29), Max: 98.9 (15 Dec 2022 21:05)  HR: 63 (16 Dec 2022 06:29) (63 - 67)  BP: 164/72 (16 Dec 2022 06:29) (134/68 - 164/72)  BP(mean): --  RR: 20 (16 Dec 2022 06:29) (20 - 20)  SpO2: 97% (16 Dec 2022 06:29) (96% - 97%)    Parameters below as of 16 Dec 2022 06:29  Patient On (Oxygen Delivery Method): room air        MEDICATIONS  (STANDING):  aspirin enteric coated 81 milliGRAM(s) Oral daily  heparin   Injectable 5000 Unit(s) SubCutaneous every 8 hours  polyethylene glycol 3350 17 Gram(s) Oral daily  tamsulosin 0.4 milliGRAM(s) Oral at bedtime    MEDICATIONS  (PRN):  acetaminophen     Tablet .. 650 milliGRAM(s) Oral every 6 hours PRN Temp greater or equal to 38C (100.4F)         Physical Therapy Functional Status Assessment :   12/15/2022     Pain Assessment/Number Scale (0-10) Adult  Presence of Pain: complains of pain/discomfort;  RN Mariaelena aware; patient unable to quantify pain  Body Location: Bilateral:   knee    Cognitive/Neuro      Cognitive/Neuro/Behavioral  Cognitive/Neuro/Behavioral [WDL Definition: Alert; opens eyes spontaneously; arouses to voice or touch; oriented x 4; follows commands; speech spontaneous, logical; purposeful motor response; behavior appropriate to situation]: WDL except  Level of Consciousness: confused;  alert  Orientation: disoriented to;  place;  time;  situation    Language Assistance  Preferred Language to Address Healthcare Preferred Language to Address Healthcare: English    Therapeutic Interventions      Bed Mobility  Bed Mobility Training Sit-to-Supine: minimum assist (75% patient effort);  1 person assist;  verbal cues;  nonverbal cues (demo/gestures)  Bed Mobility Training Supine-to-Sit: moderate assist (50% patient effort);  2 person assist;  verbal cues;  nonverbal cues (demo/gestures)  Bed Mobility Training Limitations: decreased ability to use legs for bridging/pushing;  decreased ability to use arms for pushing/pulling;  cognitive, decreased safety awareness    Sit-Stand Transfer Training  Transfer Training Sit-to-Stand Transfer: moderate assist (50% patient effort);  2 person assist;  verbal cues;  weight-bearing as tolerated   rolling walker;  x 4 trials  Transfer Training Stand-to-Sit Transfer: moderate assist (50% patient effort);  minimum assist (75% patient effort);  2 person assist;  verbal cues;  nonverbal cues (demo/gestures);  weight-bearing as tolerated   rolling walker  Sit-to-Stand Transfer Training Transfer Safety Analysis: decreased weight-shifting ability;  decreased anterior weightshifting;  pain;  impaired balance;  impaired postural control;  cognitive, decreased safety awareness;  rolling walker    Gait Training  Gait Training: moderate assist (50% patient effort);  2 person assist;  verbal cues;  weight-bearing as tolerated   rolling walker;  8 side steps x 1, 6 steps forward/backward x 2 with seated rest break in between each ambulation trial  Gait Analysis: 3-point gait   decreased indira;  decreased hip/knee flexion;  decreased step length;  decreased weight-shifting ability;  impaired balance;  impaired postural control;  cognitive, decreased safety awareness;  8 side steps x 1, 6 steps forward/backward x 2 with seated rest break in between each ambulation trial;  rolling walker    Therapeutic Exercise  Therapeutic Exercise Detail: IN supine: bilateral ankle DF?PF x 5 rpes. Bilateral heel slides x 5 repsSitting on EOB: bilateral knee ext, hip flexion x 5 reps each             PM&R Impression : as above    Current Disposition Plan Recommendations :    subacute rehab placement

## 2022-12-16 NOTE — PROGRESS NOTE ADULT - PROVIDER SPECIALTY LIST ADULT
Internal Medicine
Rehab Medicine
Internal Medicine

## 2022-12-16 NOTE — DISCHARGE NOTE NURSING/CASE MANAGEMENT/SOCIAL WORK - NSDCPEFALRISK_GEN_ALL_CORE
For information on Fall & Injury Prevention, visit: [email or web address] OR  [email or web address] OR  https://www.cdc.gov/steadi/patient.html

## 2022-12-16 NOTE — DISCHARGE NOTE NURSING/CASE MANAGEMENT/SOCIAL WORK - NSDCVIVACCINE_GEN_ALL_CORE_FT
influenza, high-dose, quadrivalent; 14-Dec-2022 16:33; Savanna Brooks (RN); [employer]; [id number] (Exp. Date: 30-Jun-2023); IntraMuscular; Deltoid Right.; 0.7 milliLiter(s); VIS (VIS Published: 06-Aug-2021, VIS Presented: 14-Dec-2022);

## 2022-12-16 NOTE — PROGRESS NOTE ADULT - ASSESSMENT
per Internal Medicine    88 y o M PMH dementia, colon cancer (dx 1996 w/ short course of chemo and partial colectomy), BPH, mild anemia p/w weakness. Admitted for decreased ambulation requiring assistance and DWIGHT, w/ cc by sepsis likely 2/2 aspiration vs. UTI.   Patient currently awaiting MIA      Problem/Plan - 1:  ·  Problem: Acute sepsis.   ·  Plan: RESOLVED  likely 2/2 aspiration pneumonia.   - Zosyn complete.    Problem/Plan - 2:  ·  Problem: Pneumonia, aspiration.   ·  Plan: WBC 22, not downtrending but fever curve coming down. WIll continue Zosyn for now. Xray in AM concerning for LLL infiltrate, not consistent with aspiration. Procal was elevated.   - pending GOC discussion  - patient on pureed food, family aware of aspiration risk.    Problem/Plan - 3:  ·  Problem: UTI (urinary tract infection).   ·  Plan: No more suprapubic pain this AM. Zosyn complete.    Problem/Plan - 4:  ·  Problem: Functional weakness.   ·  Plan: Hx Dementia. CTH: Frontotemporal atrophy progressed since 2016. Per wife, decreased ambulation since last admission now requiring assistance, prompting admission. Holding off further evaluation for now i/s/o infection,   - palliative following   - MIA recommended.    Problem/Plan - 5:  ·  Problem: DWIGHT (acute kidney injury).   ·  Plan: Now suspected ATN 2/2 episodes of hypotension. Will continue to monitor Cr in coming days/.   - gentle hydration, expect Cr to rise further, will watch for post obstructive diuresis when the time comes  - improved today.    Problem/Plan - 6:  ·  Problem: Anemia.   ·  Plan: normocytic anemia (hgb 11, above baseline 10.3).  - Active T+S, transfuse if hgb<7.    Problem/Plan - 7:  ·  Problem: Constipation.   ·  Plan: Last admitted July 2022 for constipation, urinary retention.  - started bowel regimen (Senna & Miralax).    Problem/Plan - 8:  ·  Problem: BPH (benign prostatic hyperplasia).   ·  Plan: - c/w home Tamsulosin 0.8mg qhs.    Problem/Plan - 9:  ·  Problem: Healthcare maintenance.   ·  Plan: F: None   E: Replete as necessary K>4 Mg>2  N: NPO for now i/s/o likely aspiration event GOC discussion    DVT Prophylaxis: Lovenox 40mg daily   GI prophylaxis: None   CODE STATUS: FULL.

## 2022-12-16 NOTE — PROGRESS NOTE ADULT - REASON FOR ADMISSION
Dementia, FTT, Weakness

## 2024-06-10 NOTE — DIETITIAN INITIAL EVALUATION ADULT - NUTRITIONGOAL OUTCOME1
Calm 1. Diet to be advanced within 24-48hrs or nutrition therapy initiated via feasible route if within pt's GOC. 2. Pt to consistently meet >75% of EER during hospital stay via feasible route (and w/in GOC) and show no further signs/symptoms of malnutrition

## 2025-04-15 NOTE — H&P ADULT - HISTORY OF PRESENT ILLNESS
Patient is an 84yo M with a PMHx of Colon Ca (dx 1996 w/short course of chemo and partial colectomy), BPH not on any medical therapy who presented to the ED complaining of two days of fever, URI, and urinary incontinence at home. Patient presents with his wife who states the patient has been slightly confused for the past two days which occurred with the onset of fever and has happened in the past with UTI. Patient states he has had some nasal congestion and a dry, non-productive cough for two days. He developed urinary incontinence and nocturia. He denies dysuria, hematuria, trouble starting or stopping the flow of urine, does not report stress incontinence, but feels the urge to go and cannot make it to the bathroom in time. He and his wife both deny any gait instability or difficulty walking. He ambulates up the stairs of his walk-up building without any assistance and walks his dog at least twice per day without any assistive devices. 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    In the ED, VS T 101.8, HR 89, /72, R 18, SpO2 97% on room air. Labs with leukocytosis of 12.6 with neutrophil predominance, Cr of 1.27 (baseline 1.09), Lactate of 1.5. UA showing large blood with >10 RBC, no leukocyte esterase, no nitrates, <5 WBCs. CXR without overt infiltrate or effusion. Flu swab negative. Blood cultures obtained. EKG showing NSR. Patient was given a 2000cc NS bolus, 1000mg Acetaminophen, 3.375g of Zosyn and 1000mg Vancomycin. DISPLAY PLAN FREE TEXT Patient is an 86yo M with a PMHx of Colon Ca (dx 1996 w/short course of chemo and partial colectomy), BPH not on any medical therapy who presented to the ED complaining of two days of fever, URI, and urinary incontinence at home. Patient presents with his wife who states the patient has been slightly confused for the past two days which occurred with the onset of fever and has happened in the past with UTI. Patient states he has had some nasal congestion and a dry, non-productive cough for two days. He developed urinary incontinence and nocturia. He denies dysuria, hematuria, trouble starting or stopping the flow of urine, does not report stress incontinence, but feels the urge to go and cannot make it to the bathroom in time. He and his wife both deny any gait instability or difficulty walking. He ambulates up the stairs of his walk-up building without any assistance and walks his dog at least twice per day without any assistive devices. 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    In the ED, VS T 101.8, HR 89, /72, R 18, SpO2 97% on room air. Labs with leukocytosis of 12.6 with neutrophil predominance, normocytic anemia with Hgb of 12.6 (unchanged from prior), Cr of 1.27 (baseline 1.09), Lactate of 1.5. UA showing large blood with >10 RBC, no leukocyte esterase, no nitrates, <5 WBCs. CXR without overt infiltrate or effusion. Flu swab negative. Blood cultures obtained. EKG showing NSR. Patient was given a 2000cc NS bolus, 1000mg Acetaminophen, 3.375g of Zosyn and 1000mg Vancomycin.